# Patient Record
Sex: FEMALE | Race: BLACK OR AFRICAN AMERICAN | NOT HISPANIC OR LATINO | Employment: OTHER | URBAN - METROPOLITAN AREA
[De-identification: names, ages, dates, MRNs, and addresses within clinical notes are randomized per-mention and may not be internally consistent; named-entity substitution may affect disease eponyms.]

---

## 2018-03-07 ENCOUNTER — OFFICE VISIT (OUTPATIENT)
Dept: SURGERY | Facility: CLINIC | Age: 53
End: 2018-03-07
Payer: MEDICAID

## 2018-03-07 VITALS
BODY MASS INDEX: 36.19 KG/M2 | SYSTOLIC BLOOD PRESSURE: 111 MMHG | HEART RATE: 51 BPM | TEMPERATURE: 99 F | HEIGHT: 65 IN | DIASTOLIC BLOOD PRESSURE: 69 MMHG | WEIGHT: 217.25 LBS

## 2018-03-07 DIAGNOSIS — K43.2 INCISIONAL HERNIA, WITHOUT OBSTRUCTION OR GANGRENE: ICD-10-CM

## 2018-03-07 DIAGNOSIS — Z93.3 COLOSTOMY IN PLACE: Primary | ICD-10-CM

## 2018-03-07 PROCEDURE — 99999 PR PBB SHADOW E&M-NEW PATIENT-LVL III: CPT | Mod: PBBFAC,,, | Performed by: SURGERY

## 2018-03-07 PROCEDURE — 99203 OFFICE O/P NEW LOW 30 MIN: CPT | Mod: PBBFAC | Performed by: SURGERY

## 2018-03-07 PROCEDURE — 99204 OFFICE O/P NEW MOD 45 MIN: CPT | Mod: S$PBB,,, | Performed by: SURGERY

## 2018-03-07 RX ORDER — HYDROXYZINE PAMOATE 25 MG/1
25 CAPSULE ORAL EVERY 8 HOURS PRN
COMMUNITY

## 2018-03-07 RX ORDER — HYDROCODONE BITARTRATE AND ACETAMINOPHEN 5; 325 MG/1; MG/1
1 TABLET ORAL
Status: ON HOLD | COMMUNITY
End: 2018-04-17 | Stop reason: HOSPADM

## 2018-03-07 RX ORDER — TRAZODONE HYDROCHLORIDE 50 MG/1
50 TABLET ORAL NIGHTLY PRN
COMMUNITY

## 2018-03-07 RX ORDER — ATENOLOL AND CHLORTHALIDONE TABLET 100; 25 MG/1; MG/1
1 TABLET ORAL
Status: ON HOLD | COMMUNITY
End: 2018-04-17 | Stop reason: HOSPADM

## 2018-03-07 RX ORDER — ESCITALOPRAM OXALATE 20 MG/1
20 TABLET ORAL
COMMUNITY

## 2018-03-07 NOTE — LETTER
March 9, 2018      Rico Saeed MD  [address] [facility]  [address]  [facility]  [place] [address]           [facility] - General Surgery  [address]  [place] [address]  Phone: [phone]          Patient: Barbara Ortiz   MR Number: [id number]   YOB: 1965   Date of Visit: 3/7/2018       Dear Dr. Rico Saeed:    Thank you for referring Barbara Ortzi to me for evaluation. Attached you will find relevant portions of my assessment and plan of care.    If you have questions, please do not hesitate to call me. I look forward to following Barbara Ortiz along with you.    Sincerely,    Teo Baker MD    Enclosure  CC:  No Recipients    If you would like to receive this communication electronically, please contact [email or web address] or [phone] to request more information on [employer] access.    For providers and/or their staff who would like to refer a patient to [facility], please contact us through our one-stop-shop provider referral line, [facility] , at [phone].    If you feel you have received this communication in error or would no longer like to receive these types of communications, please e-mail [email or web address]

## 2018-03-09 NOTE — PROGRESS NOTES
History & Physical    SUBJECTIVE:     History of Present Illness:  Patient is a 52 y.o. female presents with abdominal pain. Onset of symptoms was gradual starting several months ago with gradually worsening course since that time. Patient denies fever or chills.  [facility]. Symptoms are aggravated by eating. Symptoms improve with rest.      Chief Complaint   Patient presents with    Consult       Review of patient's allergies indicates:  No Known Allergies    Current Outpatient Prescriptions   Medication Sig Dispense Refill    atenolol-chlorthalidone (TENORETIC) 100-25 mg per tablet Take 1 tablet by mouth.      escitalopram oxalate (LEXAPRO) 20 MG tablet Take 20 mg by mouth.      hydrocodone-acetaminophen 5-325mg (NORCO) 5-325 mg per tablet Take 1 tablet by mouth.      hydrOXYzine pamoate (VISTARIL) 25 MG Cap Take 25 mg by mouth every 4 (four) hours as needed.       traZODone (DESYREL) 50 MG tablet Take 50 mg by mouth once daily.        No current facility-administered medications for this visit.        No past medical history on file.  No past surgical history on file.  No family history on file.  Social History   Substance Use Topics    Smoking status: Never Smoker    Smokeless tobacco: Never Used    Alcohol use Not on file        Review of Systems:      I have reviewed the Patient Summary Reports.        Review of Systems  Anesthesia Hx:  No problems with previous Anesthesia    Social:  Non-Smoker    Hematology/Oncology:  Hematology Normal   Oncology Normal     EENT/Dental:EENT/Dental Normal   Cardiovascular:  Cardiovascular Normal     Renal/:  Renal/ Normal     Hepatic/GI:  Hepatic/GI Normal    Musculoskeletal:  Musculoskeletal Normal    Neurological:  Neurology Normal    Dermatological:  Skin Normal    Psych:  Psychiatric Normal           OBJECTIVE:     Vital Signs (Most Recent)  Temp: 98.6 °F (37 °C) (03/07/18 1311)  Pulse: (!) 51 (03/07/18 1311)  BP: 111/69  "(03/07/18 1311)  5' 5" (1.651 m)  98.5 kg (217 lb 4.2 oz)     Physical Exam:    Physical Exam  General:  Well nourished, Obesity    Airway/Jaw/Neck:  AIRWAY FINDINGS: Normal      Eyes/Ears/Nose:  EYES/EARS/NOSE FINDINGS: Normal   Dental:  DENTAL FINDINGS: Normal   Chest/Lungs:  Chest/Lungs Clear    Heart/Vascular:  Heart Findings: Normal Heart murmur: negative Vascular Findings: Normal    Abdomen:  Abdomen Findings:  Colostomy in place    Moderately tender but reducible incisional hernia     Musculoskeletal:  Musculoskeletal Findings: Normal   Skin:  Skin Findings: Normal    Mental Status:  Mental Status Findings: Normal        Laboratory  none    Diagnostic Results:  none    ASSESSMENT/PLAN:     Patient s/p partial colectomy for perforated diverticulitis    PLAN:Plan     gastrograffin enema to assess ability to perform colostomy takedown,  Repair incisional hernia       "

## 2018-03-28 ENCOUNTER — OFFICE VISIT (OUTPATIENT)
Dept: SURGERY | Facility: CLINIC | Age: 53
End: 2018-03-28
Payer: MEDICAID

## 2018-03-28 ENCOUNTER — HOSPITAL ENCOUNTER (OUTPATIENT)
Dept: CARDIOLOGY | Facility: CLINIC | Age: 53
Discharge: HOME | End: 2018-03-28
Attending: SURGERY
Payer: COMMERCIAL

## 2018-03-28 VITALS
HEIGHT: 65 IN | HEART RATE: 59 BPM | TEMPERATURE: 99 F | SYSTOLIC BLOOD PRESSURE: 116 MMHG | DIASTOLIC BLOOD PRESSURE: 72 MMHG | WEIGHT: 217.69 LBS | BODY MASS INDEX: 36.27 KG/M2

## 2018-03-28 DIAGNOSIS — K43.2 INCISIONAL HERNIA, WITHOUT OBSTRUCTION OR GANGRENE: ICD-10-CM

## 2018-03-28 DIAGNOSIS — Z93.3 COLOSTOMY IN PLACE: ICD-10-CM

## 2018-03-28 DIAGNOSIS — K43.2 INCISIONAL HERNIA, WITHOUT OBSTRUCTION OR GANGRENE: Primary | ICD-10-CM

## 2018-03-28 PROCEDURE — 99213 OFFICE O/P EST LOW 20 MIN: CPT | Mod: S$PBB,,, | Performed by: SURGERY

## 2018-03-28 PROCEDURE — 93010 ELECTROCARDIOGRAM REPORT: CPT | Mod: S$PBB,,, | Performed by: INTERNAL MEDICINE

## 2018-03-28 PROCEDURE — 99999 PR PBB SHADOW E&M-EST. PATIENT-LVL IV: CPT | Mod: PBBFAC,,, | Performed by: SURGERY

## 2018-03-28 PROCEDURE — 99214 OFFICE O/P EST MOD 30 MIN: CPT | Mod: PBBFAC,25 | Performed by: SURGERY

## 2018-03-28 PROCEDURE — 93005 ELECTROCARDIOGRAM TRACING: CPT | Mod: PBBFAC | Performed by: INTERNAL MEDICINE

## 2018-03-28 RX ORDER — LOSARTAN POTASSIUM 100 MG/1
100 TABLET ORAL NIGHTLY
Status: ON HOLD | COMMUNITY
Start: 2018-01-22 | End: 2018-04-17

## 2018-03-28 RX ORDER — GABAPENTIN 100 MG/1
100 CAPSULE ORAL 3 TIMES DAILY
COMMUNITY
Start: 2018-01-15 | End: 2018-09-10 | Stop reason: CLARIF

## 2018-03-28 RX ORDER — HYDROCODONE BITARTRATE AND ACETAMINOPHEN 10; 325 MG/1; MG/1
TABLET ORAL EVERY 4 HOURS PRN
Status: ON HOLD | COMMUNITY
Start: 2018-03-02 | End: 2018-04-17 | Stop reason: HOSPADM

## 2018-03-28 RX ORDER — ATENOLOL AND CHLORTHALIDONE TABLET 50; 25 MG/1; MG/1
1 TABLET ORAL DAILY
COMMUNITY
Start: 2018-02-14 | End: 2018-09-10 | Stop reason: CLARIF

## 2018-04-01 NOTE — PROGRESS NOTES
History & Physical    SUBJECTIVE:     History of Present Illness:  Patient is a 52 y.o. female presents with incisional hernia associated with abdominal pain and the desire to have colostomy reversed. Onset of symptoms was abrupt starting several months ago with gradually worsening course since that time. Patient denies curent obstructive symptoms, although did have an ED admission at [facility] requiring [facility] tube decompression. Symptoms are aggravated by activity. Symptoms improve with rest.      Chief Complaint   Patient presents with    Pre-op Exam       Review of patient's allergies indicates:  No Known Allergies    Current Outpatient Prescriptions   Medication Sig Dispense Refill    atenolol-chlorthalidone (TENORETIC) 100-25 mg per tablet Take 1 tablet by mouth.      atenolol-chlorthalidone (TENORETIC) 50-25 mg Tab Take 1 tablet by mouth once daily.       escitalopram oxalate (LEXAPRO) 20 MG tablet Take 20 mg by mouth.      gabapentin (NEURONTIN) 100 MG capsule Take 100 mg by mouth 2 (two) times daily.       hydrocodone-acetaminophen 10-325mg (NORCO)  mg Tab Take by mouth every 4 (four) hours as needed.       hydrocodone-acetaminophen 5-325mg (NORCO) 5-325 mg per tablet Take 1 tablet by mouth.      hydrOXYzine pamoate (VISTARIL) 25 MG Cap Take 25 mg by mouth every 4 (four) hours as needed.       losartan (COZAAR) 100 MG tablet Take 100 mg by mouth once daily.       traZODone (DESYREL) 50 MG tablet Take 50 mg by mouth once daily.        No current facility-administered medications for this visit.        No past medical history on file.  No past surgical history on file.  No family history on file.  Social History   Substance Use Topics    Smoking status: Never Smoker    Smokeless tobacco: Never Used    Alcohol use Not on file        Review of Systems:      I have reviewed the Patient Summary Reports.        Review of Systems  Anesthesia Hx:  No problems with previous Anesthesia    Social:  Non-Smoker   "  Hematology/Oncology:  Hematology Normal   Oncology Normal     EENT/Dental:EENT/Dental Normal   Cardiovascular:  Cardiovascular Normal     Renal/:  Renal/ Normal     Hepatic/GI:  Hepatic/GI Normal    Musculoskeletal:  Musculoskeletal Normal    Neurological:  Neurology Normal    Dermatological:  Skin Normal    Psych:  Psychiatric Normal           OBJECTIVE:     Vital Signs (Most Recent)  Temp: 98.6 °F (37 °C) (03/28/18 1310)  Pulse: (!) 59 (03/28/18 1310)  BP: 116/72 (03/28/18 1310)  5' 5" (1.651 m)  98.8 kg (217 lb 11.3 oz)     Physical Exam:    Physical Exam  General:  Obesity    Airway/Jaw/Neck:  AIRWAY FINDINGS: Normal      Eyes/Ears/Nose:  EYES/EARS/NOSE FINDINGS: Normal   Dental:  DENTAL FINDINGS: Normal   Chest/Lungs:  Chest/Lungs Clear    Heart/Vascular:  Heart Findings: Normal Heart murmur: negative Vascular Findings: Normal    Abdomen:  Abdomen Findings:  Mid epigastric incisional hernia,  Colostomy in place     Musculoskeletal:  Musculoskeletal Findings: Normal   Skin:  Skin Findings: Normal    Mental Status:  Mental Status Findings: Normal        Laboratory  none    Diagnostic Results:  CT: Reviewed  incisional hernia, adequate remaining rectosigmoid for takedown    ASSESSMENT/PLAN:     Incisional hernia and presence of colostomu    PLAN:Plan     Takedown colostomy and repair abdominal wall       "

## 2018-04-05 DIAGNOSIS — K43.2 INCISIONAL HERNIA: ICD-10-CM

## 2018-04-05 RX ORDER — SODIUM CHLORIDE 9 MG/ML
INJECTION, SOLUTION INTRAVENOUS CONTINUOUS
Status: CANCELLED | OUTPATIENT
Start: 2018-04-05

## 2018-04-09 ENCOUNTER — TELEPHONE (OUTPATIENT)
Dept: SURGERY | Facility: CLINIC | Age: 53
End: 2018-04-09

## 2018-04-09 NOTE — PRE-PROCEDURE INSTRUCTIONS
PreOp Instructions given:     - Verbal medication information (what to hold and what to take)   - NPO guidelines   - Arrival place directions given;   - Bathing with antibacterial soap   - Don't wear any jewelry or bring any valuables AM of surgery   - No makeup or moisturizer to face   - No perfume/cologne, powder, lotions or aftershave     Pt. verbalized understanding.     Denies any  history of side effects or issues with anesthesia or sedation.    ODALIS - INSTRUCTED TO BRING CPAP MACHINE    PT REPORTS THAT SHE IS A VERY DIFFICULT IV STICK - REMARKS THAT IN THE PAST - PICC LINES HAVE BEEN PLACED.

## 2018-04-10 ENCOUNTER — SURGERY (OUTPATIENT)
Age: 53
End: 2018-04-10

## 2018-04-10 ENCOUNTER — HOSPITAL ENCOUNTER (INPATIENT)
Facility: HOSPITAL | Age: 53
LOS: 7 days | Discharge: HOME HEALTH | DRG: 330 | End: 2018-04-17
Attending: SURGERY | Admitting: SURGERY
Payer: COMMERCIAL

## 2018-04-10 ENCOUNTER — ANESTHESIA EVENT (OUTPATIENT)
Dept: SURGERY | Facility: HOSPITAL | Age: 53
DRG: 330 | End: 2018-04-10
Payer: COMMERCIAL

## 2018-04-10 ENCOUNTER — ANESTHESIA (OUTPATIENT)
Dept: SURGERY | Facility: HOSPITAL | Age: 53
DRG: 330 | End: 2018-04-10
Payer: COMMERCIAL

## 2018-04-10 DIAGNOSIS — K43.2 INCISIONAL HERNIA: ICD-10-CM

## 2018-04-10 PROCEDURE — 71000039 HC RECOVERY, EACH ADD'L HOUR: Performed by: SURGERY

## 2018-04-10 PROCEDURE — 37000009 HC ANESTHESIA EA ADD 15 MINS: Performed by: SURGERY

## 2018-04-10 PROCEDURE — 88304 TISSUE EXAM BY PATHOLOGIST: CPT

## 2018-04-10 PROCEDURE — 63600175 PHARM REV CODE 636 W HCPCS: Performed by: SURGERY

## 2018-04-10 PROCEDURE — D9220A PRA ANESTHESIA: Mod: ANES,,, | Performed by: ANESTHESIOLOGY

## 2018-04-10 PROCEDURE — 36000708 HC OR TIME LEV III 1ST 15 MIN: Performed by: SURGERY

## 2018-04-10 PROCEDURE — 27000221 HC OXYGEN, UP TO 24 HOURS

## 2018-04-10 PROCEDURE — 25000003 PHARM REV CODE 250: Performed by: SURGERY

## 2018-04-10 PROCEDURE — 25000003 PHARM REV CODE 250: Performed by: NURSE ANESTHETIST, CERTIFIED REGISTERED

## 2018-04-10 PROCEDURE — 94761 N-INVAS EAR/PLS OXIMETRY MLT: CPT

## 2018-04-10 PROCEDURE — 27201423 OPTIME MED/SURG SUP & DEVICES STERILE SUPPLY: Performed by: SURGERY

## 2018-04-10 PROCEDURE — 63600175 PHARM REV CODE 636 W HCPCS: Performed by: NURSE ANESTHETIST, CERTIFIED REGISTERED

## 2018-04-10 PROCEDURE — D9220A PRA ANESTHESIA: Mod: CRNA,,, | Performed by: NURSE ANESTHETIST, CERTIFIED REGISTERED

## 2018-04-10 PROCEDURE — C9290 INJ, BUPIVACAINE LIPOSOME: HCPCS | Performed by: SURGERY

## 2018-04-10 PROCEDURE — 71000033 HC RECOVERY, INTIAL HOUR: Performed by: SURGERY

## 2018-04-10 PROCEDURE — 37000008 HC ANESTHESIA 1ST 15 MINUTES: Performed by: SURGERY

## 2018-04-10 PROCEDURE — 63600175 PHARM REV CODE 636 W HCPCS: Performed by: PHYSICIAN ASSISTANT

## 2018-04-10 PROCEDURE — 44626 REPAIR BOWEL OPENING: CPT | Mod: ,,, | Performed by: SURGERY

## 2018-04-10 PROCEDURE — 36000709 HC OR TIME LEV III EA ADD 15 MIN: Performed by: SURGERY

## 2018-04-10 PROCEDURE — 25000003 PHARM REV CODE 250: Performed by: PHYSICIAN ASSISTANT

## 2018-04-10 PROCEDURE — 88304 TISSUE EXAM BY PATHOLOGIST: CPT | Mod: 26,,,

## 2018-04-10 RX ORDER — BUPIVACAINE HYDROCHLORIDE 2.5 MG/ML
INJECTION, SOLUTION EPIDURAL; INFILTRATION; INTRACAUDAL
Status: DISCONTINUED | OUTPATIENT
Start: 2018-04-10 | End: 2018-04-10

## 2018-04-10 RX ORDER — RAMELTEON 8 MG/1
8 TABLET ORAL NIGHTLY PRN
Status: DISCONTINUED | OUTPATIENT
Start: 2018-04-10 | End: 2018-04-17 | Stop reason: HOSPADM

## 2018-04-10 RX ORDER — NALOXONE HCL 0.4 MG/ML
0.02 VIAL (ML) INJECTION
Status: DISCONTINUED | OUTPATIENT
Start: 2018-04-10 | End: 2018-04-12

## 2018-04-10 RX ORDER — ENOXAPARIN SODIUM 100 MG/ML
40 INJECTION SUBCUTANEOUS EVERY 24 HOURS
Status: DISCONTINUED | OUTPATIENT
Start: 2018-04-11 | End: 2018-04-17 | Stop reason: HOSPADM

## 2018-04-10 RX ORDER — SODIUM CHLORIDE 0.9 % (FLUSH) 0.9 %
3 SYRINGE (ML) INJECTION
Status: DISCONTINUED | OUTPATIENT
Start: 2018-04-10 | End: 2018-04-17 | Stop reason: HOSPADM

## 2018-04-10 RX ORDER — LIDOCAINE HCL/PF 100 MG/5ML
SYRINGE (ML) INTRAVENOUS
Status: DISCONTINUED | OUTPATIENT
Start: 2018-04-10 | End: 2018-04-10

## 2018-04-10 RX ORDER — MIDAZOLAM HYDROCHLORIDE 1 MG/ML
INJECTION, SOLUTION INTRAMUSCULAR; INTRAVENOUS
Status: DISCONTINUED | OUTPATIENT
Start: 2018-04-10 | End: 2018-04-10

## 2018-04-10 RX ORDER — HYDROMORPHONE HCL IN 0.9% NACL 6 MG/30 ML
PATIENT CONTROLLED ANALGESIA SYRINGE INTRAVENOUS
Status: DISPENSED
Start: 2018-04-10 | End: 2018-04-11

## 2018-04-10 RX ORDER — METOCLOPRAMIDE HYDROCHLORIDE 5 MG/ML
10 INJECTION INTRAMUSCULAR; INTRAVENOUS EVERY 10 MIN PRN
Status: DISCONTINUED | OUTPATIENT
Start: 2018-04-10 | End: 2018-04-10 | Stop reason: HOSPADM

## 2018-04-10 RX ORDER — HYDROMORPHONE HCL IN 0.9% NACL 6 MG/30 ML
PATIENT CONTROLLED ANALGESIA SYRINGE INTRAVENOUS CONTINUOUS
Status: DISCONTINUED | OUTPATIENT
Start: 2018-04-10 | End: 2018-04-12

## 2018-04-10 RX ORDER — FENTANYL CITRATE 50 UG/ML
INJECTION, SOLUTION INTRAMUSCULAR; INTRAVENOUS
Status: DISCONTINUED | OUTPATIENT
Start: 2018-04-10 | End: 2018-04-10

## 2018-04-10 RX ORDER — GLYCOPYRROLATE 0.2 MG/ML
INJECTION INTRAMUSCULAR; INTRAVENOUS
Status: DISCONTINUED | OUTPATIENT
Start: 2018-04-10 | End: 2018-04-10

## 2018-04-10 RX ORDER — DEXAMETHASONE SODIUM PHOSPHATE 4 MG/ML
INJECTION, SOLUTION INTRA-ARTICULAR; INTRALESIONAL; INTRAMUSCULAR; INTRAVENOUS; SOFT TISSUE
Status: DISCONTINUED | OUTPATIENT
Start: 2018-04-10 | End: 2018-04-10

## 2018-04-10 RX ORDER — NEOSTIGMINE METHYLSULFATE 1 MG/ML
INJECTION, SOLUTION INTRAVENOUS
Status: DISCONTINUED | OUTPATIENT
Start: 2018-04-10 | End: 2018-04-10

## 2018-04-10 RX ORDER — ATENOLOL 50 MG/1
100 TABLET ORAL DAILY
Status: DISCONTINUED | OUTPATIENT
Start: 2018-04-11 | End: 2018-04-17

## 2018-04-10 RX ORDER — FENTANYL CITRATE 50 UG/ML
25 INJECTION, SOLUTION INTRAMUSCULAR; INTRAVENOUS EVERY 5 MIN PRN
Status: DISCONTINUED | OUTPATIENT
Start: 2018-04-10 | End: 2018-04-10 | Stop reason: HOSPADM

## 2018-04-10 RX ORDER — VECURONIUM BROMIDE FOR INJECTION 1 MG/ML
INJECTION, POWDER, LYOPHILIZED, FOR SOLUTION INTRAVENOUS
Status: DISCONTINUED | OUTPATIENT
Start: 2018-04-10 | End: 2018-04-10

## 2018-04-10 RX ORDER — DEXTROSE MONOHYDRATE, SODIUM CHLORIDE, AND POTASSIUM CHLORIDE 50; 1.49; 4.5 G/1000ML; G/1000ML; G/1000ML
INJECTION, SOLUTION INTRAVENOUS
Status: DISPENSED
Start: 2018-04-10 | End: 2018-04-11

## 2018-04-10 RX ORDER — PROPOFOL 10 MG/ML
VIAL (ML) INTRAVENOUS
Status: DISCONTINUED | OUTPATIENT
Start: 2018-04-10 | End: 2018-04-10

## 2018-04-10 RX ORDER — ACETAMINOPHEN 10 MG/ML
INJECTION, SOLUTION INTRAVENOUS
Status: DISCONTINUED | OUTPATIENT
Start: 2018-04-10 | End: 2018-04-10

## 2018-04-10 RX ORDER — ROCURONIUM BROMIDE 10 MG/ML
INJECTION, SOLUTION INTRAVENOUS
Status: DISCONTINUED | OUTPATIENT
Start: 2018-04-10 | End: 2018-04-10

## 2018-04-10 RX ORDER — ATENOLOL AND CHLORTHALIDONE TABLET 100; 25 MG/1; MG/1
1 TABLET ORAL DAILY
Status: DISCONTINUED | OUTPATIENT
Start: 2018-04-11 | End: 2018-04-10

## 2018-04-10 RX ORDER — DIPHENHYDRAMINE HYDROCHLORIDE 50 MG/ML
25 INJECTION INTRAMUSCULAR; INTRAVENOUS EVERY 4 HOURS PRN
Status: DISCONTINUED | OUTPATIENT
Start: 2018-04-10 | End: 2018-04-17 | Stop reason: HOSPADM

## 2018-04-10 RX ORDER — DEXTROSE MONOHYDRATE, SODIUM CHLORIDE, AND POTASSIUM CHLORIDE 50; 1.49; 4.5 G/1000ML; G/1000ML; G/1000ML
INJECTION, SOLUTION INTRAVENOUS CONTINUOUS
Status: DISCONTINUED | OUTPATIENT
Start: 2018-04-10 | End: 2018-04-15

## 2018-04-10 RX ORDER — SODIUM CHLORIDE 9 MG/ML
INJECTION, SOLUTION INTRAVENOUS CONTINUOUS
Status: DISCONTINUED | OUTPATIENT
Start: 2018-04-10 | End: 2018-04-10

## 2018-04-10 RX ORDER — CHLORTHALIDONE 25 MG/1
25 TABLET ORAL DAILY
Status: DISCONTINUED | OUTPATIENT
Start: 2018-04-11 | End: 2018-04-17 | Stop reason: HOSPADM

## 2018-04-10 RX ORDER — ESCITALOPRAM OXALATE 20 MG/1
20 TABLET ORAL DAILY
Status: DISCONTINUED | OUTPATIENT
Start: 2018-04-11 | End: 2018-04-17 | Stop reason: HOSPADM

## 2018-04-10 RX ORDER — ONDANSETRON 8 MG/1
8 TABLET, ORALLY DISINTEGRATING ORAL EVERY 8 HOURS PRN
Status: DISCONTINUED | OUTPATIENT
Start: 2018-04-10 | End: 2018-04-17 | Stop reason: HOSPADM

## 2018-04-10 RX ORDER — ONDANSETRON 2 MG/ML
INJECTION INTRAMUSCULAR; INTRAVENOUS
Status: DISCONTINUED | OUTPATIENT
Start: 2018-04-10 | End: 2018-04-10

## 2018-04-10 RX ORDER — CEFAZOLIN SODIUM 1 G/3ML
2 INJECTION, POWDER, FOR SOLUTION INTRAMUSCULAR; INTRAVENOUS
Status: COMPLETED | OUTPATIENT
Start: 2018-04-10 | End: 2018-04-10

## 2018-04-10 RX ADMIN — BUPIVACAINE HYDROCHLORIDE 30 ML: 2.5 INJECTION, SOLUTION EPIDURAL; INFILTRATION; INTRACAUDAL; PERINEURAL at 12:04

## 2018-04-10 RX ADMIN — SODIUM CHLORIDE, SODIUM GLUCONATE, SODIUM ACETATE, POTASSIUM CHLORIDE, MAGNESIUM CHLORIDE, SODIUM PHOSPHATE, DIBASIC, AND POTASSIUM PHOSPHATE: .53; .5; .37; .037; .03; .012; .00082 INJECTION, SOLUTION INTRAVENOUS at 10:04

## 2018-04-10 RX ADMIN — FENTANYL CITRATE 50 MCG: 50 INJECTION, SOLUTION INTRAMUSCULAR; INTRAVENOUS at 12:04

## 2018-04-10 RX ADMIN — FENTANYL CITRATE 50 MCG: 50 INJECTION, SOLUTION INTRAMUSCULAR; INTRAVENOUS at 09:04

## 2018-04-10 RX ADMIN — VECURONIUM BROMIDE FOR INJECTION 1 MG: 1 INJECTION, POWDER, LYOPHILIZED, FOR SOLUTION INTRAVENOUS at 10:04

## 2018-04-10 RX ADMIN — NEOSTIGMINE METHYLSULFATE 4 MG: 1 INJECTION INTRAVENOUS at 12:04

## 2018-04-10 RX ADMIN — GLYCOPYRROLATE 0.6 MG: 0.2 INJECTION, SOLUTION INTRAMUSCULAR; INTRAVENOUS at 12:04

## 2018-04-10 RX ADMIN — BUPIVACAINE 20 ML: 13.3 INJECTION, SUSPENSION, LIPOSOMAL INFILTRATION at 12:04

## 2018-04-10 RX ADMIN — LIDOCAINE HYDROCHLORIDE 60 MG: 20 INJECTION, SOLUTION INTRAVENOUS at 09:04

## 2018-04-10 RX ADMIN — VECURONIUM BROMIDE FOR INJECTION 1 MG: 1 INJECTION, POWDER, LYOPHILIZED, FOR SOLUTION INTRAVENOUS at 12:04

## 2018-04-10 RX ADMIN — ROCURONIUM BROMIDE 20 MG: 10 INJECTION, SOLUTION INTRAVENOUS at 09:04

## 2018-04-10 RX ADMIN — DIPHENHYDRAMINE HYDROCHLORIDE 25 MG: 50 INJECTION, SOLUTION INTRAMUSCULAR; INTRAVENOUS at 09:04

## 2018-04-10 RX ADMIN — DEXAMETHASONE SODIUM PHOSPHATE 8 MG: 4 INJECTION, SOLUTION INTRAMUSCULAR; INTRAVENOUS at 09:04

## 2018-04-10 RX ADMIN — Medication: at 11:04

## 2018-04-10 RX ADMIN — ROCURONIUM BROMIDE 40 MG: 10 INJECTION, SOLUTION INTRAVENOUS at 09:04

## 2018-04-10 RX ADMIN — ONDANSETRON 4 MG: 2 INJECTION INTRAMUSCULAR; INTRAVENOUS at 09:04

## 2018-04-10 RX ADMIN — Medication: at 01:04

## 2018-04-10 RX ADMIN — ACETAMINOPHEN 1000 MG: 10 INJECTION, SOLUTION INTRAVENOUS at 09:04

## 2018-04-10 RX ADMIN — ROCURONIUM BROMIDE 20 MG: 10 INJECTION, SOLUTION INTRAVENOUS at 10:04

## 2018-04-10 RX ADMIN — MIDAZOLAM HYDROCHLORIDE 2 MG: 1 INJECTION, SOLUTION INTRAMUSCULAR; INTRAVENOUS at 09:04

## 2018-04-10 RX ADMIN — FENTANYL CITRATE 50 MCG: 50 INJECTION, SOLUTION INTRAMUSCULAR; INTRAVENOUS at 10:04

## 2018-04-10 RX ADMIN — FENTANYL CITRATE 25 MCG: 50 INJECTION, SOLUTION INTRAMUSCULAR; INTRAVENOUS at 12:04

## 2018-04-10 RX ADMIN — PROPOFOL 170 MG: 10 INJECTION, EMULSION INTRAVENOUS at 09:04

## 2018-04-10 RX ADMIN — CEFAZOLIN 2 G: 330 INJECTION, POWDER, FOR SOLUTION INTRAMUSCULAR; INTRAVENOUS at 09:04

## 2018-04-10 RX ADMIN — VECURONIUM BROMIDE FOR INJECTION 1 MG: 1 INJECTION, POWDER, LYOPHILIZED, FOR SOLUTION INTRAVENOUS at 11:04

## 2018-04-10 RX ADMIN — DEXTROSE MONOHYDRATE, SODIUM CHLORIDE, AND POTASSIUM CHLORIDE: 50; 4.5; 1.49 INJECTION, SOLUTION INTRAVENOUS at 09:04

## 2018-04-10 RX ADMIN — DEXTROSE MONOHYDRATE, SODIUM CHLORIDE, AND POTASSIUM CHLORIDE: 50; 4.5; 1.49 INJECTION, SOLUTION INTRAVENOUS at 01:04

## 2018-04-10 RX ADMIN — SODIUM CHLORIDE: 0.9 INJECTION, SOLUTION INTRAVENOUS at 08:04

## 2018-04-10 NOTE — TRANSFER OF CARE
"Anesthesia Transfer of Care Note    Patient: Barbara Ortiz    Procedure(s) Performed: Procedure(s) (LRB):  REPAIR-HERNIA-INCISIONAL-INITIAL Open (N/A)  COLOSTOMY-REVERSAL (N/A)    Patient location: PACU    Anesthesia Type: general    Transport from OR: Transported from OR on 6-10 L/min O2 by face mask with adequate spontaneous ventilation    Post pain: adequate analgesia    Post assessment: no apparent anesthetic complications    Post vital signs: stable    Level of consciousness: sedated and responds to stimulation    Nausea/Vomiting: no nausea/vomiting    Complications: none    Transfer of care protocol was followed      Last vitals:   Visit Vitals  /71   Pulse 97   Temp 36.9 °C (98.4 °F) (Axillary)   Resp 18   Ht 5' 5" (1.651 m)   Wt 98.4 kg (217 lb)   SpO2 100%   Breastfeeding? No   BMI 36.11 kg/m²     "

## 2018-04-10 NOTE — ANESTHESIA PREPROCEDURE EVALUATION
04/10/2018  Barbara Ortiz is a 52 y.o., female.    Anesthesia Evaluation    I have reviewed the Patient Summary Reports.    I have reviewed the Nursing Notes.   I have reviewed the Medications.     Review of Systems  Anesthesia Hx:  No problems with previous Anesthesia  History of prior surgery of interest to airway management or planning: Denies Family Hx of Anesthesia complications.   Denies Personal Hx of Anesthesia complications.   Hematology/Oncology:  Hematology Normal   Oncology Normal     EENT/Dental:EENT/Dental Normal   Cardiovascular:   Hypertension    Pulmonary:   Sleep Apnea, CPAP    Renal/:  Renal/ Normal     Hepatic/GI:   GERD, well controlled    Musculoskeletal:  Musculoskeletal Normal    Neurological:  Neurology Normal    Endocrine:  Endocrine Normal    Dermatological:  Skin Normal    Psych:   anxiety          Physical Exam  General:  Obesity    Airway/Jaw/Neck:  Airway Findings: Mouth Opening: Normal Tongue: Normal  General Airway Assessment: Adult  Mallampati: I  Improves to I with phonation.  TM Distance: Normal, at least 6 cm     Eyes/Ears/Nose:  EYES/EARS/NOSE FINDINGS: Normal    Chest/Lungs:  Chest/Lungs Clear    Heart/Vascular:  Heart Findings: Normal       Mental Status:  Mental Status Findings: Normal        Anesthesia Plan  Type of Anesthesia, risks & benefits discussed:  Anesthesia Type:  general  Patient's Preference: General  Intra-op Monitoring Plan: standard ASA monitors  Intra-op Monitoring Plan Comments:   Post Op Pain Control Plan: per primary service following discharge from PACU and IV/PO Opioids PRN  Post Op Pain Control Plan Comments:   Induction:   IV  Beta Blocker:       Beta Blocker Comments: Beta-blocker not taken this morning due to being a combination pill. Will treat intraoperatively as needed.   Informed Consent: Patient understands risks and agrees with  Anesthesia plan.  Questions answered. Anesthesia consent signed with patient.  ASA Score: 2     Day of Surgery Review of History & Physical:    H&P update referred to the surgeon.         Ready For Surgery From Anesthesia Perspective.

## 2018-04-10 NOTE — INTERVAL H&P NOTE
The patient has been examined and the H&P has been reviewed:    I concur with the findings and no changes have occurred since H&P was written.     To OR for hernia repair and takedown of colostomy     Anesthesia/Surgery risks, benefits and alternative options discussed and understood by patient/family.          Active Hospital Problems    Diagnosis  POA    Incisional hernia [K43.2]  Yes      Resolved Hospital Problems    Diagnosis Date Resolved POA   No resolved problems to display.

## 2018-04-10 NOTE — NURSING TRANSFER
Nursing Transfer Note      4/10/2018     Transfer To: 632    Transfer via bed    Transfer with IV pole, PCA    Transported by RN    Medicines sent: none    Chart send with patient: Yes    Notified: sister    Patient reassessed at: 5609

## 2018-04-10 NOTE — BRIEF OP NOTE
[facility]  Brief Operative Note    SUMMARY     Surgery Date: 4/10/2018     Surgeon(s) and Role:     * Kermit Alberto MD - Resident - Assisting     * Joey Quan MD - Resident - Assisting     * Teo Baker MD - Primary        Pre-op Diagnosis:  Colostomy in place [Z93.3]  Incisional hernia, without obstruction or gangrene [K43.2]    Post-op Diagnosis:  Post-Op Diagnosis Codes:     * Colostomy in place [Z93.3]     * Incisional hernia, without obstruction or gangrene [K43.2]    Procedure(s) (LRB):  REPAIR-HERNIA-INCISIONAL-INITIAL Open (N/A)  COLOSTOMY-REVERSAL (N/A)    Anesthesia: General    Description of Procedure: exploratory laparotomy, colostomy take down, stapled colorectal anastomosis, with primary repair of colotomy in 2 layers,. Drain left in pelvis, incisional hernia and ostomy site closed primarily, ostomy wound packed      Estimated Blood Loss: 300 mL         Specimens:   Specimen (12h ago through future)    None

## 2018-04-11 PROBLEM — I10 HYPERTENSION: Status: ACTIVE | Noted: 2018-04-11

## 2018-04-11 PROBLEM — F32.A DEPRESSION: Status: ACTIVE | Noted: 2018-04-11

## 2018-04-11 LAB
ALBUMIN SERPL BCP-MCNC: 3.3 G/DL
ALP SERPL-CCNC: 80 U/L
ALT SERPL W/O P-5'-P-CCNC: 22 U/L
ANION GAP SERPL CALC-SCNC: 7 MMOL/L
ANISOCYTOSIS BLD QL SMEAR: SLIGHT
AST SERPL-CCNC: 19 U/L
BASOPHILS # BLD AUTO: 0.02 K/UL
BASOPHILS NFR BLD: 0.1 %
BILIRUB SERPL-MCNC: 1.5 MG/DL
BUN SERPL-MCNC: 13 MG/DL
CALCIUM SERPL-MCNC: 8.5 MG/DL
CHLORIDE SERPL-SCNC: 100 MMOL/L
CO2 SERPL-SCNC: 26 MMOL/L
CREAT SERPL-MCNC: 0.9 MG/DL
DIFFERENTIAL METHOD: ABNORMAL
EOSINOPHIL # BLD AUTO: 0 K/UL
EOSINOPHIL NFR BLD: 0 %
ERYTHROCYTE [DISTWIDTH] IN BLOOD BY AUTOMATED COUNT: 14.5 %
EST. GFR  (AFRICAN AMERICAN): >60 ML/MIN/1.73 M^2
EST. GFR  (NON AFRICAN AMERICAN): >60 ML/MIN/1.73 M^2
GLUCOSE SERPL-MCNC: 133 MG/DL
HCT VFR BLD AUTO: 32.5 %
HGB BLD-MCNC: 10.7 G/DL
HYPOCHROMIA BLD QL SMEAR: ABNORMAL
IMM GRANULOCYTES # BLD AUTO: 0.06 K/UL
IMM GRANULOCYTES NFR BLD AUTO: 0.4 %
LYMPHOCYTES # BLD AUTO: 1.6 K/UL
LYMPHOCYTES NFR BLD: 11.2 %
MAGNESIUM SERPL-MCNC: 1.9 MG/DL
MCH RBC QN AUTO: 30.1 PG
MCHC RBC AUTO-ENTMCNC: 32.9 G/DL
MCV RBC AUTO: 91 FL
MONOCYTES # BLD AUTO: 1.5 K/UL
MONOCYTES NFR BLD: 10.5 %
NEUTROPHILS # BLD AUTO: 10.8 K/UL
NEUTROPHILS NFR BLD: 77.8 %
NRBC BLD-RTO: 0 /100 WBC
OVALOCYTES BLD QL SMEAR: ABNORMAL
PHOSPHATE SERPL-MCNC: 2.8 MG/DL
PLATELET # BLD AUTO: 228 K/UL
PMV BLD AUTO: 10.3 FL
POIKILOCYTOSIS BLD QL SMEAR: SLIGHT
POLYCHROMASIA BLD QL SMEAR: ABNORMAL
POTASSIUM SERPL-SCNC: 4.3 MMOL/L
PROT SERPL-MCNC: 6.8 G/DL
RBC # BLD AUTO: 3.56 M/UL
SODIUM SERPL-SCNC: 133 MMOL/L
WBC # BLD AUTO: 13.83 K/UL

## 2018-04-11 PROCEDURE — 94799 UNLISTED PULMONARY SVC/PX: CPT

## 2018-04-11 PROCEDURE — 25000003 PHARM REV CODE 250: Performed by: SURGERY

## 2018-04-11 PROCEDURE — 80053 COMPREHEN METABOLIC PANEL: CPT

## 2018-04-11 PROCEDURE — 99900035 HC TECH TIME PER 15 MIN (STAT)

## 2018-04-11 PROCEDURE — 83735 ASSAY OF MAGNESIUM: CPT

## 2018-04-11 PROCEDURE — 84100 ASSAY OF PHOSPHORUS: CPT

## 2018-04-11 PROCEDURE — 63600175 PHARM REV CODE 636 W HCPCS: Performed by: SURGERY

## 2018-04-11 PROCEDURE — 0WQF0ZZ REPAIR ABDOMINAL WALL, OPEN APPROACH: ICD-10-PCS | Performed by: SURGERY

## 2018-04-11 PROCEDURE — 85025 COMPLETE CBC W/AUTO DIFF WBC: CPT

## 2018-04-11 PROCEDURE — 36415 COLL VENOUS BLD VENIPUNCTURE: CPT

## 2018-04-11 PROCEDURE — 25000003 PHARM REV CODE 250: Performed by: STUDENT IN AN ORGANIZED HEALTH CARE EDUCATION/TRAINING PROGRAM

## 2018-04-11 PROCEDURE — 94770 HC EXHALED C02 TEST: CPT

## 2018-04-11 PROCEDURE — 20600001 HC STEP DOWN PRIVATE ROOM

## 2018-04-11 PROCEDURE — 0DQL0ZZ REPAIR TRANSVERSE COLON, OPEN APPROACH: ICD-10-PCS | Performed by: SURGERY

## 2018-04-11 RX ORDER — GABAPENTIN 100 MG/1
100 CAPSULE ORAL 2 TIMES DAILY
Status: DISCONTINUED | OUTPATIENT
Start: 2018-04-11 | End: 2018-04-17 | Stop reason: HOSPADM

## 2018-04-11 RX ORDER — LANOLIN ALCOHOL/MO/W.PET/CERES
400 CREAM (GRAM) TOPICAL ONCE
Status: COMPLETED | OUTPATIENT
Start: 2018-04-11 | End: 2018-04-11

## 2018-04-11 RX ORDER — LOSARTAN POTASSIUM 50 MG/1
100 TABLET ORAL NIGHTLY
Status: DISCONTINUED | OUTPATIENT
Start: 2018-04-11 | End: 2018-04-17

## 2018-04-11 RX ADMIN — DEXTROSE MONOHYDRATE, SODIUM CHLORIDE, AND POTASSIUM CHLORIDE: 50; 4.5; 1.49 INJECTION, SOLUTION INTRAVENOUS at 08:04

## 2018-04-11 RX ADMIN — ENOXAPARIN SODIUM 40 MG: 100 INJECTION SUBCUTANEOUS at 07:04

## 2018-04-11 RX ADMIN — DIPHENHYDRAMINE HYDROCHLORIDE 25 MG: 50 INJECTION, SOLUTION INTRAMUSCULAR; INTRAVENOUS at 03:04

## 2018-04-11 RX ADMIN — MAGNESIUM OXIDE TAB 400 MG (241.3 MG ELEMENTAL MG) 400 MG: 400 (241.3 MG) TAB at 10:04

## 2018-04-11 RX ADMIN — GABAPENTIN 100 MG: 100 CAPSULE ORAL at 10:04

## 2018-04-11 RX ADMIN — CHLORTHALIDONE 25 MG: 25 TABLET ORAL at 09:04

## 2018-04-11 RX ADMIN — DEXTROSE MONOHYDRATE, SODIUM CHLORIDE, AND POTASSIUM CHLORIDE: 50; 4.5; 1.49 INJECTION, SOLUTION INTRAVENOUS at 11:04

## 2018-04-11 RX ADMIN — ESCITALOPRAM 20 MG: 20 TABLET, FILM COATED ORAL at 09:04

## 2018-04-11 RX ADMIN — GABAPENTIN 100 MG: 100 CAPSULE ORAL at 08:04

## 2018-04-11 RX ADMIN — DIPHENHYDRAMINE HYDROCHLORIDE 25 MG: 50 INJECTION, SOLUTION INTRAMUSCULAR; INTRAVENOUS at 10:04

## 2018-04-11 RX ADMIN — Medication: at 10:04

## 2018-04-11 RX ADMIN — DEXTROSE MONOHYDRATE, SODIUM CHLORIDE, AND POTASSIUM CHLORIDE: 50; 4.5; 1.49 INJECTION, SOLUTION INTRAVENOUS at 03:04

## 2018-04-11 RX ADMIN — LOSARTAN POTASSIUM 100 MG: 50 TABLET, FILM COATED ORAL at 08:04

## 2018-04-11 NOTE — OP NOTE
DATE OF PROCEDURE:  04/10/2018    PREOPERATIVE DIAGNOSES:  1.  Presence of colostomy in place.  2.  Incisional hernia.    POSTOPERATIVE DIAGNOSES:  1.  Presence of colostomy in place.  2.  Incisional hernia.    PROCEDURES:  1.  Takedown of colostomy with colorectal anastomosis.  2.  Repair of incisional hernia.    SURGEON:  Teo Baker M.D.    ASSISTANT:  Kermit Alberto M.D. (RES).    ANESTHESIA:  General.    CLINICAL NOTE:  The patient is a 52-year-old female, status post multiple   surgeries and a previous colostomy, who has developed an incisional hernia and   desires her colostomy reversed.    PROCEDURE NOTE:  Following induction of adequate general anesthesia, the   patient's abdomen was prepped and draped with ChloraPrep.  The patient had been   placed in a dorsal lithotomy position.  We then opened her previous incision and   dissected down and ultimately we were able to enter the abdomen.  We then took   down some moderately dense adhesions from the undersurface of the abdominal wall   as we opened the incision fully down to the suprapubic area.  We then entered   the abdomen.  I took down some adhesions and mobilized the remaining transverse   colon with sharp as well as blunt dissection and hemostasis was obtained along   the way with ligatures with 2-0 and 3-0 silk.  We then explored the rectal stump   and we were able to after tedious dissection and preservation of the ureters   identify the rectal stump in the pelvis.  We came across the stump and dissected   free the edges and we then elected to perform an EEA end-to-end anastomosis   from above after making a colotomy in the colon that we were mobilizing into the   pelvis for the anastomosis and then we brought the stapler through this staple   line after placing the anvil in the rectal stump and securing it in place with a   pursestring of 2-0 Prolene.  We then performed the anastomosis and as I   inspected there was an anterior disruption;  however, the bulk of it was intact.    We repaired this anterior disruption with 3-0 Monocryl and 3-0 Vicryl and it   appeared to be tension free, fully patent and viable.  We then irrigated and   obtained hemostasis with cautery.  I then closed the incisional hernia that had   been the site for the patient's recent incarceration with #1 PDS from the inside   and then we closed the fascia at the colostomy takedown site with #1 PDS.  We   then closed the midline with #1 PDS running and staples were applied to skin.    Sterile dressings were then applied and the procedure was terminated with the   patient tolerating it well.    ESTIMATED BLOOD LOSS:  200 mL      MCT/HN  dd: 04/11/2018 12:17:57 (CDT)  td: 04/11/2018 13:45:16 (CDT)  Doc ID   #[id number]  Job ID #[id number]    CC:

## 2018-04-11 NOTE — PLAN OF CARE
"Patient lives in a 1 story house w/2 DARREN (no railings), w/3 DARREN w/1 railing w/male friend. Not medically stable for discharge;POD # 1: waiting for bowel function to return. No needs determined.    [facility] Packet given to patient after informed about it;patient verbalized their understanding.        04/11/18 7425   Discharge Assessment   Assessment Type Discharge Planning Assessment   Confirmed/corrected address and phone number on facesheet? Yes   Assessment information obtained from? Patient;Medical Record   Expected Length of Stay (days) (6+)   Communicated expected length of stay with patient/caregiver no  (Per MD)   Prior to hospitilization cognitive status: Alert/Oriented;No Deficits   Prior to hospitalization functional status: Independent;Assistive Equipment   Current cognitive status: Alert/Oriented;No Deficits   Current Functional Status: Independent;Assistive Equipment;Needs Assistance   Facility Arrived From: (N/A)   Lives With friend(s)  (Tommy GASCA [phone])   Able to Return to Prior Arrangements yes   Is patient able to care for self after discharge? Yes   Who are your caregiver(s) and their phone number(s)? (Emergency contact: Jessie Allred Sister     [phone]. "I have friends who can help me when I go home.")   Patient's perception of discharge disposition home or selfcare   Readmission Within The Last 30 Days no previous admission in last 30 days   Patient currently being followed by outpatient case management? No   Patient currently receives any other outside agency services? No   Equipment Currently Used at Home CPAP   Do you have any problems affording any of your prescribed medications? No   Is the patient taking medications as prescribed? yes   Does the patient have transportation home? Yes   Transportation Available car;family or friend will provide   Dialysis Name and Scheduled days (N/A)   Does the patient receive services at the Coumadin Clinic? No   Discharge Plan A " Home  (Friends to assist her)   Discharge Plan B Home  (as above)   Patient/Family In Agreement With Plan yes

## 2018-04-11 NOTE — PLAN OF CARE
Problem: Patient Care Overview  Goal: Plan of Care Review  Outcome: Ongoing (interventions implemented as appropriate)  Plan of care reviewed with patient, who verbalized understanding. Pt describes pain as well controlled with PCA, though the patient needed to be encouraged to use the PCA button. Pt co- operates with turning and positioning, and is willing to increase activity this morning.

## 2018-04-11 NOTE — PROGRESS NOTES
[facility]  General Surgery  Progress Note    Subjective:     Post-Op Info:  Procedure(s) (LRB):  REPAIR-HERNIA-INCISIONAL-INITIAL Open (N/A)  COLOSTOMY-REVERSAL (N/A)   1 Day Post-Op     Interval History:   Patient seen and examined, no acute events overnight  Reports some bleeding from colostomy take down site, dressing changed twice overnight  Pain well controlled with PCA  LEON drain put out 145mL overnight  Denies N/V/belching  Afebrile/VSS    Medications:  Continuous Infusions:   dextrose 5 % and 0.45 % NaCl with KCl 20 mEq 125 mL/hr at 04/11/18 0346    hydromorphone in 0.9 % NaCl 6 mg/30 ml       Scheduled Meds:   atenolol  100 mg Oral Daily    chlorthalidone  25 mg Oral Daily    enoxaparin  40 mg Subcutaneous Daily    escitalopram oxalate  20 mg Oral Daily     PRN Meds:diphenhydrAMINE, naloxone, ondansetron, promethazine (PHENERGAN) IVPB, ramelteon, sodium chloride 0.9%     Review of patient's allergies indicates:  No Known Allergies  Objective:     Vital Signs (Most Recent):  Temp: 99.3 °F (37.4 °C) (04/11/18 0705)  Pulse: 95 (04/11/18 0705)  Resp: 20 (04/11/18 0705)  BP: (!) 97/52 (04/11/18 0705)  SpO2: 96 % (04/11/18 0705) Vital Signs (24h Range):  Temp:  [97.7 °F (36.5 °C)-99.7 °F (37.6 °C)] 99.3 °F (37.4 °C)  Pulse:  [] 95  Resp:  [16-29] 20  SpO2:  [96 %-100 %] 96 %  BP: ()/(52-74) 97/52     Weight: 99.7 kg (219 lb 12.8 oz)  Body mass index is 36.58 kg/m².    Intake/Output - Last 3 Shifts       04/09 0700 - 04/10 0659 04/10 0700 - 04/11 0659 04/11 0700 - 04/12 0659    P.O.  0     I.V. (mL/kg)  4403.5 (44.2)     Total Intake(mL/kg)  4403.5 (44.2)     Urine (mL/kg/hr)  1270     Drains  145     Blood  300     Total Output   1715      Net   +2688.5                   Physical Exam   Constitutional: She appears well-developed and well-nourished. No distress.   HENT:   Head: Normocephalic and atraumatic.   Cardiovascular: Normal rate and regular rhythm.    Pulmonary/Chest:  Effort normal. No respiratory distress.   Abdominal:   Soft, appropriate TTP, non-distended  Surgical wounds with dressing in place - clean, dry and intact at this time       Significant Labs:  CBC:   Recent Labs  Lab 04/11/18  0509   WBC 13.83*   RBC 3.56*   HGB 10.7*   HCT 32.5*      MCV 91   MCH 30.1   MCHC 32.9     BMP:   Recent Labs  Lab 04/11/18  0509   *   *   K 4.3      CO2 26   BUN 13   CREATININE 0.9   CALCIUM 8.5*   MG 1.9     CMP:   Recent Labs  Lab 04/11/18  0509   *   CALCIUM 8.5*   ALBUMIN 3.3*   PROT 6.8   *   K 4.3   CO2 26      BUN 13   CREATININE 0.9   ALKPHOS 80   ALT 22   AST 19   BILITOT 1.5*     LFTs:   Recent Labs  Lab 04/11/18  0509   ALT 22   AST 19   ALKPHOS 80   BILITOT 1.5*   PROT 6.8   ALBUMIN 3.3*     Assessment/Plan:     * Incisional hernia    53 yo female s/p incisional hernia repair, colostomy take down 4/10/18    -ok for ice chips/sips  -PCA for pain  -nausea meds PRN  -IVF d5 @ 125  -DVT prophylaxis  -PT/OT/IS        Depression    Home meds  -lexapro          Hypertension    Home meds  -atenolol, chlorthalidone            Martha Cho PA-C   [id number]  General Surgery  [facility]

## 2018-04-11 NOTE — PROGRESS NOTES
While sitting up at the edge of the bed, considerable bloody drainage was noted. Findings reported to Dr. Jonas, who personally examined the patient. Bloody drainage from LEON drain also reviewed. No new orders given. Permission granted to change the telfa dressing around midline incision and ostomy takedown site. Will continue to monitor.

## 2018-04-11 NOTE — SUBJECTIVE & OBJECTIVE
Interval History:   Patient seen and examined, no acute events overnight  Reports some bleeding from colostomy take down site, dressing changed twice overnight  Pain well controlled with PCA  LENO drain put out 145mL overnight  Denies N/V/belching  Afebrile/VSS    Medications:  Continuous Infusions:   dextrose 5 % and 0.45 % NaCl with KCl 20 mEq 125 mL/hr at 04/11/18 0346    hydromorphone in 0.9 % NaCl 6 mg/30 ml       Scheduled Meds:   atenolol  100 mg Oral Daily    chlorthalidone  25 mg Oral Daily    enoxaparin  40 mg Subcutaneous Daily    escitalopram oxalate  20 mg Oral Daily     PRN Meds:diphenhydrAMINE, naloxone, ondansetron, promethazine (PHENERGAN) IVPB, ramelteon, sodium chloride 0.9%     Review of patient's allergies indicates:  No Known Allergies  Objective:     Vital Signs (Most Recent):  Temp: 99.3 °F (37.4 °C) (04/11/18 0705)  Pulse: 95 (04/11/18 0705)  Resp: 20 (04/11/18 0705)  BP: (!) 97/52 (04/11/18 0705)  SpO2: 96 % (04/11/18 0705) Vital Signs (24h Range):  Temp:  [97.7 °F (36.5 °C)-99.7 °F (37.6 °C)] 99.3 °F (37.4 °C)  Pulse:  [] 95  Resp:  [16-29] 20  SpO2:  [96 %-100 %] 96 %  BP: ()/(52-74) 97/52     Weight: 99.7 kg (219 lb 12.8 oz)  Body mass index is 36.58 kg/m².    Intake/Output - Last 3 Shifts       04/09 0700 - 04/10 0659 04/10 0700 - 04/11 0659 04/11 0700 - 04/12 0659    P.O.  0     I.V. (mL/kg)  4403.5 (44.2)     Total Intake(mL/kg)  4403.5 (44.2)     Urine (mL/kg/hr)  1270     Drains  145     Blood  300     Total Output   1715      Net   +2688.5                   Physical Exam   Constitutional: She appears well-developed and well-nourished. No distress.   HENT:   Head: Normocephalic and atraumatic.   Cardiovascular: Normal rate and regular rhythm.    Pulmonary/Chest: Effort normal. No respiratory distress.   Abdominal:   Soft, appropriate TTP, non-distended  Surgical wounds with dressing in place - clean, dry and intact at this time       Significant Labs:  CBC:   Recent  Labs  Lab 04/11/18  0509   WBC 13.83*   RBC 3.56*   HGB 10.7*   HCT 32.5*      MCV 91   MCH 30.1   MCHC 32.9     BMP:   Recent Labs  Lab 04/11/18  0509   *   *   K 4.3      CO2 26   BUN 13   CREATININE 0.9   CALCIUM 8.5*   MG 1.9     CMP:   Recent Labs  Lab 04/11/18  0509   *   CALCIUM 8.5*   ALBUMIN 3.3*   PROT 6.8   *   K 4.3   CO2 26      BUN 13   CREATININE 0.9   ALKPHOS 80   ALT 22   AST 19   BILITOT 1.5*     LFTs:   Recent Labs  Lab 04/11/18  0509   ALT 22   AST 19   ALKPHOS 80   BILITOT 1.5*   PROT 6.8   ALBUMIN 3.3*

## 2018-04-11 NOTE — ASSESSMENT & PLAN NOTE
51 yo female s/p incisional hernia repair, colostomy take down 4/10/18    -ok for ice chips/sips  -PCA for pain  -nausea meds PRN  -IVF d5 @ 125  -DVT prophylaxis  -PT/OT/IS

## 2018-04-12 LAB
ALBUMIN SERPL BCP-MCNC: 3.2 G/DL
ALP SERPL-CCNC: 83 U/L
ALT SERPL W/O P-5'-P-CCNC: 16 U/L
ANION GAP SERPL CALC-SCNC: 8 MMOL/L
AST SERPL-CCNC: 19 U/L
BASOPHILS # BLD AUTO: 0.03 K/UL
BASOPHILS NFR BLD: 0.2 %
BILIRUB SERPL-MCNC: 1.8 MG/DL
BUN SERPL-MCNC: 10 MG/DL
CALCIUM SERPL-MCNC: 8.5 MG/DL
CHLORIDE SERPL-SCNC: 99 MMOL/L
CO2 SERPL-SCNC: 25 MMOL/L
CREAT SERPL-MCNC: 0.9 MG/DL
DIFFERENTIAL METHOD: ABNORMAL
EOSINOPHIL # BLD AUTO: 0.1 K/UL
EOSINOPHIL NFR BLD: 0.5 %
ERYTHROCYTE [DISTWIDTH] IN BLOOD BY AUTOMATED COUNT: 14.7 %
EST. GFR  (AFRICAN AMERICAN): >60 ML/MIN/1.73 M^2
EST. GFR  (NON AFRICAN AMERICAN): >60 ML/MIN/1.73 M^2
GLUCOSE SERPL-MCNC: 138 MG/DL
HCT VFR BLD AUTO: 30.5 %
HGB BLD-MCNC: 10.4 G/DL
IMM GRANULOCYTES # BLD AUTO: 0.12 K/UL
IMM GRANULOCYTES NFR BLD AUTO: 0.7 %
LYMPHOCYTES # BLD AUTO: 2.2 K/UL
LYMPHOCYTES NFR BLD: 13.2 %
MAGNESIUM SERPL-MCNC: 2.2 MG/DL
MCH RBC QN AUTO: 30.3 PG
MCHC RBC AUTO-ENTMCNC: 34.1 G/DL
MCV RBC AUTO: 89 FL
MONOCYTES # BLD AUTO: 1.4 K/UL
MONOCYTES NFR BLD: 8.5 %
NEUTROPHILS # BLD AUTO: 12.6 K/UL
NEUTROPHILS NFR BLD: 76.9 %
NRBC BLD-RTO: 0 /100 WBC
PHOSPHATE SERPL-MCNC: 2.1 MG/DL
PLATELET # BLD AUTO: 186 K/UL
PMV BLD AUTO: 10.8 FL
POTASSIUM SERPL-SCNC: 4.1 MMOL/L
PROT SERPL-MCNC: 7 G/DL
RBC # BLD AUTO: 3.43 M/UL
SODIUM SERPL-SCNC: 132 MMOL/L
WBC # BLD AUTO: 16.36 K/UL

## 2018-04-12 PROCEDURE — 84100 ASSAY OF PHOSPHORUS: CPT

## 2018-04-12 PROCEDURE — 83735 ASSAY OF MAGNESIUM: CPT

## 2018-04-12 PROCEDURE — 25000003 PHARM REV CODE 250: Performed by: STUDENT IN AN ORGANIZED HEALTH CARE EDUCATION/TRAINING PROGRAM

## 2018-04-12 PROCEDURE — 63600175 PHARM REV CODE 636 W HCPCS: Performed by: SURGERY

## 2018-04-12 PROCEDURE — 25000003 PHARM REV CODE 250: Performed by: SURGERY

## 2018-04-12 PROCEDURE — 36415 COLL VENOUS BLD VENIPUNCTURE: CPT

## 2018-04-12 PROCEDURE — 20600001 HC STEP DOWN PRIVATE ROOM

## 2018-04-12 PROCEDURE — 85025 COMPLETE CBC W/AUTO DIFF WBC: CPT

## 2018-04-12 PROCEDURE — 80053 COMPREHEN METABOLIC PANEL: CPT

## 2018-04-12 RX ORDER — MORPHINE SULFATE 2 MG/ML
4 INJECTION, SOLUTION INTRAMUSCULAR; INTRAVENOUS EVERY 4 HOURS PRN
Status: DISCONTINUED | OUTPATIENT
Start: 2018-04-12 | End: 2018-04-17

## 2018-04-12 RX ORDER — OXYCODONE AND ACETAMINOPHEN 10; 325 MG/1; MG/1
1 TABLET ORAL EVERY 4 HOURS PRN
Status: DISCONTINUED | OUTPATIENT
Start: 2018-04-12 | End: 2018-04-17 | Stop reason: HOSPADM

## 2018-04-12 RX ORDER — OXYCODONE AND ACETAMINOPHEN 5; 325 MG/1; MG/1
1 TABLET ORAL EVERY 4 HOURS PRN
Status: DISCONTINUED | OUTPATIENT
Start: 2018-04-12 | End: 2018-04-17 | Stop reason: HOSPADM

## 2018-04-12 RX ADMIN — LOSARTAN POTASSIUM 100 MG: 50 TABLET, FILM COATED ORAL at 08:04

## 2018-04-12 RX ADMIN — OXYCODONE HYDROCHLORIDE AND ACETAMINOPHEN 1 TABLET: 10; 325 TABLET ORAL at 07:04

## 2018-04-12 RX ADMIN — ENOXAPARIN SODIUM 40 MG: 100 INJECTION SUBCUTANEOUS at 05:04

## 2018-04-12 RX ADMIN — DEXTROSE MONOHYDRATE, SODIUM CHLORIDE, AND POTASSIUM CHLORIDE: 50; 4.5; 1.49 INJECTION, SOLUTION INTRAVENOUS at 05:04

## 2018-04-12 RX ADMIN — OXYCODONE HYDROCHLORIDE AND ACETAMINOPHEN 1 TABLET: 10; 325 TABLET ORAL at 05:04

## 2018-04-12 RX ADMIN — DEXTROSE MONOHYDRATE, SODIUM CHLORIDE, AND POTASSIUM CHLORIDE: 50; 4.5; 1.49 INJECTION, SOLUTION INTRAVENOUS at 06:04

## 2018-04-12 RX ADMIN — DIPHENHYDRAMINE HYDROCHLORIDE 25 MG: 50 INJECTION, SOLUTION INTRAMUSCULAR; INTRAVENOUS at 10:04

## 2018-04-12 RX ADMIN — CHLORTHALIDONE 25 MG: 25 TABLET ORAL at 09:04

## 2018-04-12 RX ADMIN — GABAPENTIN 100 MG: 100 CAPSULE ORAL at 08:04

## 2018-04-12 RX ADMIN — ATENOLOL 100 MG: 50 TABLET ORAL at 09:04

## 2018-04-12 RX ADMIN — DIPHENHYDRAMINE HYDROCHLORIDE 25 MG: 50 INJECTION, SOLUTION INTRAMUSCULAR; INTRAVENOUS at 08:04

## 2018-04-12 RX ADMIN — ESCITALOPRAM 20 MG: 20 TABLET, FILM COATED ORAL at 09:04

## 2018-04-12 RX ADMIN — GABAPENTIN 100 MG: 100 CAPSULE ORAL at 09:04

## 2018-04-12 NOTE — ASSESSMENT & PLAN NOTE
53 yo female s/p incisional hernia repair, colostomy take down 4/10/18    -CLD  -change to PO pain  -nausea meds PRN  -IVF d5 @ 75  -DVT prophylaxis  -PT/OT/IS

## 2018-04-12 NOTE — PLAN OF CARE
Problem: Patient Care Overview  Goal: Plan of Care Review  Outcome: Ongoing (interventions implemented as appropriate)  Plan of care reviewed with patient. Patient verbalized understanding. Patient is oriented x4. Patient remained on PCA for pain meds. Patient bonilla remained intact. Patient remained free of any falls or acute events. VSS, Will continue to monitor.

## 2018-04-12 NOTE — PLAN OF CARE
Problem: Patient Care Overview  Goal: Plan of Care Review  Outcome: Ongoing (interventions implemented as appropriate)  Plan of care reviewed with patient. Patient verbalized understanding. Patient is oriented x4. Patient received prn pain meds per PCA. Patient bonilla remained intact

## 2018-04-12 NOTE — PLAN OF CARE
Problem: Patient Care Overview  Goal: Plan of Care Review  Outcome: Ongoing (interventions implemented as appropriate)  POC reviewed with pt verbalizing understanding and no further questions at this time. AAO x4 with VSS on RA. Afebrile with Tmax 99.7 orally. ML abdominal incision with sutures intact SANFORD with left lower abdomen incision has open edges with wet-to-dry gauze. Pt denies SOB, CP, and headaches. Incisional pain being managed with oral PRN meds, see MAR. Pt ambulates independently in halls and to bathroom while remaining free of falls and injury. Pt easily falls asleep sitting up and once while sitting on toilet. Pt verbalizes her unawareness to protect IV and drain tubes. Pt has inadvertionly pulled out a abdominal LEON drain yesterday and two peripheral IVs today. Pt urged to call for assistance. Tolerating clear liquid diet with no c/o nausea. Bed in lowest position with call light within reach. WCTM

## 2018-04-12 NOTE — SUBJECTIVE & OBJECTIVE
Interval History:   Patient seen and examined, no acute events overnight  Reports some bleeding from colostomy take down site, dressing changed twice overnight  Pain well controlled with PCA  LEON drain put out 145mL overnight  Denies N/V/belching  Afebrile/VSS    Medications:  Continuous Infusions:   dextrose 5 % and 0.45 % NaCl with KCl 20 mEq 125 mL/hr at 04/12/18 0515     Scheduled Meds:   atenolol  100 mg Oral Daily    chlorthalidone  25 mg Oral Daily    enoxaparin  40 mg Subcutaneous Daily    escitalopram oxalate  20 mg Oral Daily    gabapentin  100 mg Oral BID    losartan  100 mg Oral QHS     PRN Meds:diphenhydrAMINE, morphine, ondansetron, oxyCODONE-acetaminophen, oxyCODONE-acetaminophen, promethazine (PHENERGAN) IVPB, ramelteon, sodium chloride 0.9%     Review of patient's allergies indicates:  No Known Allergies  Objective:     Vital Signs (Most Recent):  Temp: 99.3 °F (37.4 °C) (04/12/18 0322)  Pulse: 103 (04/12/18 0322)  Resp: 16 (04/12/18 0322)  BP: 116/64 (04/12/18 0322)  SpO2: 99 % (04/12/18 0322) Vital Signs (24h Range):  Temp:  [98.8 °F (37.1 °C)-100.2 °F (37.9 °C)] 99.3 °F (37.4 °C)  Pulse:  [] 103  Resp:  [16-25] 16  SpO2:  [92 %-99 %] 99 %  BP: ()/(52-85) 116/64     Weight: 99.7 kg (219 lb 12.8 oz)  Body mass index is 36.58 kg/m².    Intake/Output - Last 3 Shifts       04/10 0700 - 04/11 0659 04/11 0700 - 04/12 0659    P.O. 0 130    I.V. (mL/kg) 4403.5 (44.2) 2420.8 (24.3)    Total Intake(mL/kg) 4403.5 (44.2) 2550.8 (25.6)    Urine (mL/kg/hr) 1270 1750 (0.7)    Emesis/NG output  0 (0)    Drains 145 50 (0)    Other  0 (0)    Stool  0 (0)    Blood 300     Total Output 1715 1800    Net +2688.5 +750.8          Stool Occurrence  0 x    Emesis Occurrence  0 x          Physical Exam   Constitutional: She is oriented to person, place, and time. She appears well-developed and well-nourished. No distress.   HENT:   Head: Normocephalic and atraumatic.   Cardiovascular: Normal rate and  regular rhythm.    Pulmonary/Chest: Effort normal. No respiratory distress.   Abdominal: Tenderness: minimal, incisional.   Soft, appropriate TTP, non-distended  Surgical wounds clean, dry and intact  Ostomy reversal packing changed, wound base clean    Musculoskeletal: She exhibits no edema.   Neurological: She is alert and oriented to person, place, and time.   Skin: Skin is warm. Capillary refill takes less than 2 seconds.   Psychiatric: She has a normal mood and affect.       Significant Labs:  CBC:     Recent Labs  Lab 04/11/18  0509   WBC 13.83*   RBC 3.56*   HGB 10.7*   HCT 32.5*      MCV 91   MCH 30.1   MCHC 32.9     BMP:     Recent Labs  Lab 04/11/18  0509   *   *   K 4.3      CO2 26   BUN 13   CREATININE 0.9   CALCIUM 8.5*   MG 1.9     CMP:     Recent Labs  Lab 04/11/18  0509   *   CALCIUM 8.5*   ALBUMIN 3.3*   PROT 6.8   *   K 4.3   CO2 26      BUN 13   CREATININE 0.9   ALKPHOS 80   ALT 22   AST 19   BILITOT 1.5*     LFTs:     Recent Labs  Lab 04/11/18  0509   ALT 22   AST 19   ALKPHOS 80   BILITOT 1.5*   PROT 6.8   ALBUMIN 3.3*

## 2018-04-12 NOTE — NURSING
POC reviewed with pt who verbalizes understanding with no further questions at this time. AAO x4 with VSS on RA. Afibrile. Pt c/o itching with PCA in use. PRN benadryl administered as ordered and working well. Pt continuously gets in and out of bed due to chronic back pains. Pt inadvertently pulled out LLQ abdominal LEON drain today with MD notified. Abd ML incision CDI with Telfa dressing changed due to old drainage. Kim intact with adequate dat UOP. Pt's personal CPAP at bedside and uses when napping in day and sleep at night. Denies SOB, CP, and headaches. NPO with ice chips and sips with meds with no c/o nausea. No acute distress or needs noted at this time. Bed in lowest position with call light within reach. WCTM

## 2018-04-12 NOTE — PROGRESS NOTES
[facility]  General Surgery  Progress Note    Subjective:     Post-Op Info:  Procedure(s) (LRB):  REPAIR-HERNIA-INCISIONAL-INITIAL Open (N/A)  COLOSTOMY-REVERSAL (N/A)   2 Days Post-Op     Interval History:   Patient seen and examined, no acute events overnight  Pain controlled with PCA  LEON drain accidentally pulled  Denies N/V/belching  Afebrile/VSS- some tachycardia 100s    Medications:  Continuous Infusions:   dextrose 5 % and 0.45 % NaCl with KCl 20 mEq 125 mL/hr at 04/12/18 0515     Scheduled Meds:   atenolol  100 mg Oral Daily    chlorthalidone  25 mg Oral Daily    enoxaparin  40 mg Subcutaneous Daily    escitalopram oxalate  20 mg Oral Daily    gabapentin  100 mg Oral BID    losartan  100 mg Oral QHS     PRN Meds:diphenhydrAMINE, morphine, ondansetron, oxyCODONE-acetaminophen, oxyCODONE-acetaminophen, promethazine (PHENERGAN) IVPB, ramelteon, sodium chloride 0.9%     Review of patient's allergies indicates:  No Known Allergies  Objective:     Vital Signs (Most Recent):  Temp: 99.3 °F (37.4 °C) (04/12/18 0322)  Pulse: 103 (04/12/18 0322)  Resp: 16 (04/12/18 0322)  BP: 116/64 (04/12/18 0322)  SpO2: 99 % (04/12/18 0322) Vital Signs (24h Range):  Temp:  [98.8 °F (37.1 °C)-100.2 °F (37.9 °C)] 99.3 °F (37.4 °C)  Pulse:  [] 103  Resp:  [16-25] 16  SpO2:  [92 %-99 %] 99 %  BP: ()/(52-85) 116/64     Weight: 99.7 kg (219 lb 12.8 oz)  Body mass index is 36.58 kg/m².    Intake/Output - Last 3 Shifts       04/10 0700 - 04/11 0659 04/11 0700 - 04/12 0659    P.O. 0 130    I.V. (mL/kg) 4403.5 (44.2) 2420.8 (24.3)    Total Intake(mL/kg) 4403.5 (44.2) 2550.8 (25.6)    Urine (mL/kg/hr) 1270 1750 (0.7)    Emesis/NG output  0 (0)    Drains 145 50 (0)    Other  0 (0)    Stool  0 (0)    Blood 300     Total Output 1715 1800    Net +2688.5 +750.8          Stool Occurrence  0 x    Emesis Occurrence  0 x          Physical Exam   Constitutional: She is oriented to person, place, and time. She  appears well-developed and well-nourished. No distress.   HENT:   Head: Normocephalic and atraumatic.   Cardiovascular: Normal rate and regular rhythm.    Pulmonary/Chest: Effort normal. No respiratory distress.   Abdominal: Tenderness: minimal, incisional.   Soft, appropriate TTP, non-distended  Surgical wounds clean, dry and intact  Ostomy reversal packing changed, wound base clean    Musculoskeletal: She exhibits no edema.   Neurological: She is alert and oriented to person, place, and time.   Skin: Skin is warm. Capillary refill takes less than 2 seconds.   Psychiatric: She has a normal mood and affect.       Significant Labs:  CBC:     Recent Labs  Lab 04/11/18  0509   WBC 13.83*   RBC 3.56*   HGB 10.7*   HCT 32.5*      MCV 91   MCH 30.1   MCHC 32.9     BMP:     Recent Labs  Lab 04/11/18  0509   *   *   K 4.3      CO2 26   BUN 13   CREATININE 0.9   CALCIUM 8.5*   MG 1.9     CMP:     Recent Labs  Lab 04/11/18  0509   *   CALCIUM 8.5*   ALBUMIN 3.3*   PROT 6.8   *   K 4.3   CO2 26      BUN 13   CREATININE 0.9   ALKPHOS 80   ALT 22   AST 19   BILITOT 1.5*     LFTs:     Recent Labs  Lab 04/11/18  0509   ALT 22   AST 19   ALKPHOS 80   BILITOT 1.5*   PROT 6.8   ALBUMIN 3.3*     Assessment/Plan:     * Incisional hernia    51 yo female s/p incisional hernia repair, colostomy take down 4/10/18    -CLD  -change to PO pain  -nausea meds PRN  -IVF d5 @ 75  -DVT prophylaxis  -PT/OT/IS        Depression    Home meds  -lexapro          Hypertension    Home meds  -atenolol, chlorthalidone            Fely Washburn MD  General Surgery  [facility]

## 2018-04-12 NOTE — ANESTHESIA POSTPROCEDURE EVALUATION
"Anesthesia Post Evaluation    Patient: Barbara Ortiz    Procedure(s) Performed: Procedure(s) (LRB):  REPAIR-HERNIA-INCISIONAL-INITIAL Open (N/A)  COLOSTOMY-REVERSAL (N/A)    Final Anesthesia Type: general  Patient location during evaluation: floor  Patient participation: Yes- Able to Participate  Level of consciousness: awake and alert  Post-procedure vital signs: reviewed and stable  Pain management: adequate  Airway patency: patent  PONV status at discharge: No PONV  Anesthetic complications: no      Cardiovascular status: blood pressure returned to baseline and hemodynamically stable  Respiratory status: unassisted and spontaneous ventilation  Hydration status: euvolemic  Follow-up not needed.        Visit Vitals  BP (!) 115/55 (BP Location: Left arm, Patient Position: Sitting)   Pulse 82   Temp 37.4 °C (99.4 °F) (Oral)   Resp 18   Ht 5' 5" (1.651 m)   Wt 99.7 kg (219 lb 12.8 oz)   SpO2 98%   Breastfeeding? No   BMI 36.58 kg/m²       Pain/Olivier Score: Pain Assessment Performed: Yes (4/12/2018 12:00 PM)  Presence of Pain: denies (4/12/2018 12:00 PM)  Pain Rating Prior to Med Admin: 7 (4/12/2018  7:58 AM)  Pain Rating Post Med Admin: 3 (4/11/2018  6:26 AM)      "

## 2018-04-13 LAB
ALBUMIN SERPL BCP-MCNC: 3.1 G/DL
ALP SERPL-CCNC: 126 U/L
ALT SERPL W/O P-5'-P-CCNC: 18 U/L
ANION GAP SERPL CALC-SCNC: 8 MMOL/L
AST SERPL-CCNC: 28 U/L
BASOPHILS # BLD AUTO: 0.04 K/UL
BASOPHILS NFR BLD: 0.2 %
BILIRUB SERPL-MCNC: 1.6 MG/DL
BUN SERPL-MCNC: 10 MG/DL
C DIFF GDH STL QL: NEGATIVE
C DIFF TOX A+B STL QL IA: NEGATIVE
CALCIUM SERPL-MCNC: 8.9 MG/DL
CHLORIDE SERPL-SCNC: 101 MMOL/L
CO2 SERPL-SCNC: 22 MMOL/L
CREAT SERPL-MCNC: 1.1 MG/DL
DIFFERENTIAL METHOD: ABNORMAL
EOSINOPHIL # BLD AUTO: 0.2 K/UL
EOSINOPHIL NFR BLD: 1.2 %
ERYTHROCYTE [DISTWIDTH] IN BLOOD BY AUTOMATED COUNT: 14.4 %
EST. GFR  (AFRICAN AMERICAN): >60 ML/MIN/1.73 M^2
EST. GFR  (NON AFRICAN AMERICAN): 58.7 ML/MIN/1.73 M^2
GLUCOSE SERPL-MCNC: 117 MG/DL
HCT VFR BLD AUTO: 27.4 %
HGB BLD-MCNC: 9.1 G/DL
IMM GRANULOCYTES # BLD AUTO: 0.11 K/UL
IMM GRANULOCYTES NFR BLD AUTO: 0.7 %
LYMPHOCYTES # BLD AUTO: 1.5 K/UL
LYMPHOCYTES NFR BLD: 9.5 %
MAGNESIUM SERPL-MCNC: 2.5 MG/DL
MCH RBC QN AUTO: 30.6 PG
MCHC RBC AUTO-ENTMCNC: 33.2 G/DL
MCV RBC AUTO: 92 FL
MONOCYTES # BLD AUTO: 1.1 K/UL
MONOCYTES NFR BLD: 6.5 %
NEUTROPHILS # BLD AUTO: 13.2 K/UL
NEUTROPHILS NFR BLD: 81.9 %
NRBC BLD-RTO: 0 /100 WBC
PHOSPHATE SERPL-MCNC: 2.3 MG/DL
PLATELET # BLD AUTO: 192 K/UL
PMV BLD AUTO: 10.1 FL
POTASSIUM SERPL-SCNC: 4.6 MMOL/L
PROT SERPL-MCNC: 7.4 G/DL
RBC # BLD AUTO: 2.97 M/UL
SODIUM SERPL-SCNC: 131 MMOL/L
WBC # BLD AUTO: 16.14 K/UL

## 2018-04-13 PROCEDURE — 20600001 HC STEP DOWN PRIVATE ROOM

## 2018-04-13 PROCEDURE — 63600175 PHARM REV CODE 636 W HCPCS: Performed by: SURGERY

## 2018-04-13 PROCEDURE — 84100 ASSAY OF PHOSPHORUS: CPT

## 2018-04-13 PROCEDURE — 25000003 PHARM REV CODE 250: Performed by: STUDENT IN AN ORGANIZED HEALTH CARE EDUCATION/TRAINING PROGRAM

## 2018-04-13 PROCEDURE — 36415 COLL VENOUS BLD VENIPUNCTURE: CPT

## 2018-04-13 PROCEDURE — 94761 N-INVAS EAR/PLS OXIMETRY MLT: CPT

## 2018-04-13 PROCEDURE — G8989 SELF CARE D/C STATUS: HCPCS | Mod: CJ

## 2018-04-13 PROCEDURE — 87449 NOS EACH ORGANISM AG IA: CPT

## 2018-04-13 PROCEDURE — 97161 PT EVAL LOW COMPLEX 20 MIN: CPT

## 2018-04-13 PROCEDURE — G8988 SELF CARE GOAL STATUS: HCPCS | Mod: CJ

## 2018-04-13 PROCEDURE — 63600175 PHARM REV CODE 636 W HCPCS: Performed by: STUDENT IN AN ORGANIZED HEALTH CARE EDUCATION/TRAINING PROGRAM

## 2018-04-13 PROCEDURE — G8987 SELF CARE CURRENT STATUS: HCPCS | Mod: CJ

## 2018-04-13 PROCEDURE — 25000003 PHARM REV CODE 250: Performed by: SURGERY

## 2018-04-13 PROCEDURE — 97535 SELF CARE MNGMENT TRAINING: CPT

## 2018-04-13 PROCEDURE — 85025 COMPLETE CBC W/AUTO DIFF WBC: CPT

## 2018-04-13 PROCEDURE — 97165 OT EVAL LOW COMPLEX 30 MIN: CPT

## 2018-04-13 PROCEDURE — 83735 ASSAY OF MAGNESIUM: CPT

## 2018-04-13 PROCEDURE — 80053 COMPREHEN METABOLIC PANEL: CPT

## 2018-04-13 PROCEDURE — 97530 THERAPEUTIC ACTIVITIES: CPT

## 2018-04-13 RX ADMIN — GABAPENTIN 100 MG: 100 CAPSULE ORAL at 08:04

## 2018-04-13 RX ADMIN — Medication 4 MG: at 08:04

## 2018-04-13 RX ADMIN — OXYCODONE HYDROCHLORIDE AND ACETAMINOPHEN 1 TABLET: 10; 325 TABLET ORAL at 12:04

## 2018-04-13 RX ADMIN — LOSARTAN POTASSIUM 100 MG: 50 TABLET, FILM COATED ORAL at 09:04

## 2018-04-13 RX ADMIN — SODIUM PHOSPHATE, MONOBASIC, MONOHYDRATE 15 MMOL: 276; 142 INJECTION, SOLUTION INTRAVENOUS at 12:04

## 2018-04-13 RX ADMIN — ATENOLOL 100 MG: 50 TABLET ORAL at 08:04

## 2018-04-13 RX ADMIN — GABAPENTIN 100 MG: 100 CAPSULE ORAL at 09:04

## 2018-04-13 RX ADMIN — OXYCODONE HYDROCHLORIDE AND ACETAMINOPHEN 1 TABLET: 10; 325 TABLET ORAL at 09:04

## 2018-04-13 RX ADMIN — OXYCODONE HYDROCHLORIDE AND ACETAMINOPHEN 1 TABLET: 5; 325 TABLET ORAL at 07:04

## 2018-04-13 RX ADMIN — ENOXAPARIN SODIUM 40 MG: 100 INJECTION SUBCUTANEOUS at 06:04

## 2018-04-13 RX ADMIN — DEXTROSE MONOHYDRATE, SODIUM CHLORIDE, AND POTASSIUM CHLORIDE: 50; 4.5; 1.49 INJECTION, SOLUTION INTRAVENOUS at 04:04

## 2018-04-13 RX ADMIN — DIPHENHYDRAMINE HYDROCHLORIDE 25 MG: 50 INJECTION, SOLUTION INTRAMUSCULAR; INTRAVENOUS at 08:04

## 2018-04-13 RX ADMIN — OXYCODONE HYDROCHLORIDE AND ACETAMINOPHEN 1 TABLET: 5; 325 TABLET ORAL at 12:04

## 2018-04-13 RX ADMIN — ESCITALOPRAM 20 MG: 20 TABLET, FILM COATED ORAL at 08:04

## 2018-04-13 RX ADMIN — CHLORTHALIDONE 25 MG: 25 TABLET ORAL at 08:04

## 2018-04-13 RX ADMIN — OXYCODONE HYDROCHLORIDE AND ACETAMINOPHEN 1 TABLET: 10; 325 TABLET ORAL at 06:04

## 2018-04-13 NOTE — PLAN OF CARE
Problem: Physical Therapy Goal  Goal: Physical Therapy Goal  Outcome: Outcome(s) achieved Date Met: 04/13/18  PT D/Rajesh due to pt able to perform functional mobility as needed for D/C; nurse and MD notified. Yaa Mosqueda PT 4/13/18

## 2018-04-13 NOTE — SUBJECTIVE & OBJECTIVE
Interval History:   No acute events overnight. Febrile to 101.9. Leaking from inferior portion of incision. Continued lower abdominal pain wit urination.  Ambulating, no nausea, (+) flatus and BM    Medications:  Continuous Infusions:   dextrose 5 % and 0.45 % NaCl with KCl 20 mEq 100 mL/hr at 04/13/18 0407     Scheduled Meds:   atenolol  100 mg Oral Daily    chlorthalidone  25 mg Oral Daily    enoxaparin  40 mg Subcutaneous Daily    escitalopram oxalate  20 mg Oral Daily    gabapentin  100 mg Oral BID    losartan  100 mg Oral QHS     PRN Meds:diphenhydrAMINE, morphine, ondansetron, oxyCODONE-acetaminophen, oxyCODONE-acetaminophen, promethazine (PHENERGAN) IVPB, ramelteon, sodium chloride 0.9%     Review of patient's allergies indicates:  No Known Allergies  Objective:     Vital Signs (Most Recent):  Temp: 98.4 °F (36.9 °C) (04/13/18 0520)  Pulse: 71 (04/13/18 0520)  Resp: 18 (04/13/18 0520)  BP: (!) 118/59 (04/13/18 0520)  SpO2: 98 % (04/13/18 0520) Vital Signs (24h Range):  Temp:  [98.4 °F (36.9 °C)-101.8 °F (38.8 °C)] 98.4 °F (36.9 °C)  Pulse:  [] 71  Resp:  [16-18] 18  SpO2:  [94 %-100 %] 98 %  BP: (101-137)/(54-66) 118/59     Weight: 99.7 kg (219 lb 12.8 oz)  Body mass index is 36.58 kg/m².    Intake/Output - Last 3 Shifts       04/11 0700 - 04/12 0659 04/12 0700 - 04/13 0659    P.O. 130 1550    I.V. (mL/kg) 2420.8 (24.3) 2158.8 (21.7)    Total Intake(mL/kg) 2550.8 (25.6) 3708.8 (37.2)    Urine (mL/kg/hr) 1750 (0.7) 1150 (0.5)    Emesis/NG output 0 (0) 0 (0)    Drains 50 (0)     Other 0 (0) 0 (0)    Stool 0 (0) 0 (0)    Total Output 1800 1150    Net +750.8 +2558.8          Urine Occurrence  1 x    Stool Occurrence 0 x 5 x    Emesis Occurrence 0 x 0 x          Physical Exam   Constitutional: She is oriented to person, place, and time. She appears well-developed and well-nourished. No distress.   HENT:   Head: Normocephalic and atraumatic.   Cardiovascular: Normal rate and regular rhythm.     Pulmonary/Chest: Effort normal. No respiratory distress.   Abdominal: Tenderness: minimal, incisional.   Soft, TTP RLQ and midline, non-distended  Inferior portion of incision opened, with drainage of purulence, packed with gauze  Ostomy reversal packing changed, wound base clean    Musculoskeletal: She exhibits no edema.   Neurological: She is alert and oriented to person, place, and time.   Skin: Skin is warm. Capillary refill takes less than 2 seconds.   Psychiatric: She has a normal mood and affect.       Significant Labs:  CBC:     Recent Labs  Lab 04/12/18  0408   WBC 16.36*   RBC 3.43*   HGB 10.4*   HCT 30.5*      MCV 89   MCH 30.3   MCHC 34.1     BMP:     Recent Labs  Lab 04/12/18  0408   *   *   K 4.1   CL 99   CO2 25   BUN 10   CREATININE 0.9   CALCIUM 8.5*   MG 2.2     CMP:     Recent Labs  Lab 04/12/18  0408   *   CALCIUM 8.5*   ALBUMIN 3.2*   PROT 7.0   *   K 4.1   CO2 25   CL 99   BUN 10   CREATININE 0.9   ALKPHOS 83   ALT 16   AST 19   BILITOT 1.8*     LFTs:     Recent Labs  Lab 04/12/18  0408   ALT 16   AST 19   ALKPHOS 83   BILITOT 1.8*   PROT 7.0   ALBUMIN 3.2*

## 2018-04-13 NOTE — PLAN OF CARE
Problem: Occupational Therapy Goal  Goal: Occupational Therapy Goal  Outcome: Outcome(s) achieved Date Met: 04/13/18  Eval completed; no OT needs this date

## 2018-04-13 NOTE — PT/OT/SLP EVAL
Occupational Therapy   Evaluation and Discharge Note    Name: Barbara Ortiz  MRN: [id number]  Admitting Diagnosis:  Incisional hernia 3 Days Post-Op    Recommendations:     Discharge Recommendations: home (no OT needs)  Discharge Equipment Recommendations:  bath bench  Barriers to discharge:  None    History:     Occupational Profile:  Living Environment: Pt lives with friend/fiancee in 1SH with 2STE front door, but pt reports using back entrance with 5STE and BHR. Home has tub/shower combo   Previous level of function: PTA, pt reports independence with all ADL and IADL, including driving. Pt was not using any AD for ambulation or ADLs. Pt reports having difficulty with toilet t/f at home 2/2 back problems and complication with most recent back sx. Pt states she cooks and washes dishes, but friend/fiancee completes laundry and more fatiguing housework 2/2 pt back problems and lifting restrictions. Pt states she sometimes goes to the store around the corner, but mostly completes grocery shopping with her friend/fiancee present. Pt spends most days at home  Roles and Routines: daughter, sister, aunt  Equipment Owned:  CPAP (has access to bedside commode)  Assistance upon Discharge: Pt states her friend/fiancee works during the day, however her aunt and niece are available to assist during the day as needed    Past Medical History:   Diagnosis Date    Anxiety     Diverticulitis     Fibromyalgia     Hypertension     Sleep apnea        Past Surgical History:   Procedure Laterality Date    CARPAL TUNNEL RELEASE Right     CHOLECYSTECTOMY      COLOSTOMY      LUMBAR FUSION      PARTIAL HYSTERECTOMY  2002       Subjective     Chief Complaint: pain  Patient/Family stated goals: none  Communicated with: RN prior to session.  Pain/Comfort:  · Pain Rating 1: 7/10  · Location - Side 1: Bilateral  · Location - Orientation 1: generalized  · Location 1: abdomen  · Pain Addressed 1: Reposition, Distraction  · Pain Rating  Post-Intervention 1: 7/10    Patients cultural, spiritual, Lutheran conflicts given the current situation: none reported    Objective:     Patient found with: peripheral IV    General Precautions: Standard, fall   Orthopedic Precautions:N/A   Braces: N/A     Occupational Performance:    Bed Mobility:    · Patient completed Scooting/Bridging with supervision  · Patient completed Supine to Sit with supervision  · Patient completed Sit to Supine with supervision    Functional Mobility/Transfers:  · Patient completed Sit <> Stand Transfer with supervision  with  no assistive device   · Patient completed Toilet Transfer Step Transfer technique with supervision with  no AD  · Functional Mobility: Pt ambulate bed<>bathroom with supervision using no AD    Activities of Daily Living:  · Grooming: supervision standing at sink to wash hands  · LB Dressing: independence to manage B socks while seated EOB  · Toileting: supervision for clothing management and rory cleaning    Cognitive/Visual Perceptual:  Cognitive/Psychosocial Skills:     -       Oriented to: Person, Place, Time and Situation   -       Follows Commands/attention:Follows multistep  commands  -       Communication: clear/fluent  -       Memory: No Deficits noted  -       Safety awareness/insight to disability: intact   -       Mood/Affect/Coping skills/emotional control: Appropriate to situation  Visual/Perceptual:      -Intact    Physical Exam:  Balance:    -       good sitting balance, good static/dyanmic standing balance   Postural examination/scapula alignment:    -       No postural abnormalities identified  Skin integrity: Visible skin intact  Edema:  None noted  Sensation:    -       Impaired  Pt reports numbness/tingling in BLE 2/2 back problems  Dominant hand:    -       R hand  Upper Extremity Range of Motion:     -       Right Upper Extremity: WFL  -       Left Upper Extremity: WFL  Upper Extremity Strength:    -       Right Upper Extremity: WFL  -     "   Left Upper Extremity: WFL   Strength:    -       Right Upper Extremity: WFL  -       Left Upper Extremity: WFL  Fine Motor Coordination:    -       Intact  Gross motor coordination:   WFL    Patient left supine with all lines intact and call button in reach    [facility] 6 Click:  [facility] Total Score: 22    Treatment & Education:  Pt educated on role of OT/POC  White board/communication board updated  Education:    Assessment:     Barbara Ortiz is a 52 y.o. female with a medical diagnosis of Incisional hernia. At this time, patient is functioning at their prior level of function and does not require further acute OT services.     Clinical Decision Makin.  OT Low:  "Pt evaluation falls under low complexity for evaluation coding due to performance deficits noted in 1-3 areas as stated above and 0 co-morbities affecting current functional status. Data obtained from problem focused assessments. No modifications or assistance was required for completion of evaluation. Only brief occupational profile and history review completed."     Plan:     During this hospitalization, patient does not require further acute OT services.  Please re-consult if situation changes.    · Plan of Care Reviewed with: patient    This Plan of care has been discussed with the patient who was involved in its development and understands and is in agreement with the identified goals and treatment plan    GOALS:    Occupational Therapy Goals     Not on file          Multidisciplinary Problems (Resolved)        Problem: Occupational Therapy Goal    Goal Priority Disciplines Outcome Interventions   Occupational Therapy Goal   (Resolved)     OT, PT/OT Outcome(s) achieved                    Time Tracking:     OT Date of Treatment: 18  OT Start Time: 1245  OT Stop Time: 1303  OT Total Time (min): 18 min    Billable Minutes:Evaluation 10  Self Care/Home Management 8    Lorrie Whitten OT  2018    "

## 2018-04-13 NOTE — PT/OT/SLP EVAL
"Physical Therapy Evaluation    Patient Name:  Barbara Ortiz   MRN:  [id number]    Recommendations:     Discharge Recommendations:  home   Discharge Equipment Recommendations: none   Barriers to discharge: None    Assessment:     Barbara Ortiz is a 52 y.o. female admitted with a medical diagnosis of Incisional hernia.  She presents with the following impairments/functional limitations:  pain .    Recent Surgery: Procedure(s) (LRB):  REPAIR-HERNIA-INCISIONAL-INITIAL Open (N/A)  COLOSTOMY-REVERSAL (N/A) 3 Days Post-Op    Plan:      (D/C PT due to pt able to perform functional mobility)   · Plan of Care Expires:  05/13/18   Plan of Care Reviewed with: patient    Subjective     Communicated with nurse prior to session.  Patient found supine upon PT entry to room, agreeable to evaluation.    "I have back problems, I have had a back fusion"  Pain/Comfort:  · Pain Rating 1: 5/10  · Location - Orientation 1: generalized  · Location 1: abdomen  · Pain Addressed 1: Pre-medicate for activity, Reposition, Cessation of Activity  · Pain Rating Post-Intervention 1: 5/10    Patients cultural, spiritual, Uatsdin conflicts given the current situation: none noted    Living Environment:  Pt lives with her friend in a 1 story home with 4 steps to enter with B UE rails.   Prior to admission, patients level of function was independent.  Patient has the following equipment: CPAP.  Upon discharge, patient will have assistance from friend who works during the day.    Objective:     Patient found with: peripheral IV     General Precautions: Standard, fall   Orthopedic Precautions:N/A   Braces: N/A     Exams:  · Cognitive Exam:  Patient is oriented to Person, Place, Time and Situation and follows 100% of multistep commands   · Postural Exam:  Patient presented with the following abnormalities:    · -       Rounded shoulders  · -       Forward head  · Sensation:    · -       Intact  light/touch B LE  · RLE ROM: WFL  · RLE Strength: WFL except hip " flex at least 3/5  · LLE ROM: WFL  · LLE Strength: WFL except hip flex at least 3/5    Functional Mobility:  · Bed Mobility:   Pt educated to log roll prior to coming to sit  · Rolling Right: stand by assistance  · Supine to Sit: contact guard assistance  · Transfers:     · Sit to Stand:  supervision with no AD; 1 from bed and 1 from commode  · Gait: 300ft with no AD with supervision to push IV pole. pt required 2 standing rest periods for ~ 20 sec each time due to pain in her lower abd. No instability noted.    AM-PAC 6 CLICK MOBILITY  Total Score:22   Therapeutic Activities and Exercises:  Pt ambulated to the bathroom and urinated, independent with cleaning herself. Pt educated to ambulate 3 xs/day in the conde, nurse notified    Patient left up in chair with all lines intact, call button in reach and nurse notified.    GOALS:    Physical Therapy Goals     Not on file          Multidisciplinary Problems (Resolved)        Problem: Physical Therapy Goal    Goal Priority Disciplines Outcome Goal Variances Interventions   Physical Therapy Goal   (Resolved)     PT/OT, PT Outcome(s) achieved                     History:     Past Medical History:   Diagnosis Date    Anxiety     Diverticulitis     Fibromyalgia     Hypertension     Sleep apnea        Past Surgical History:   Procedure Laterality Date    CARPAL TUNNEL RELEASE Right     CHOLECYSTECTOMY      COLOSTOMY      LUMBAR FUSION      PARTIAL HYSTERECTOMY  2002       Clinical Decision Making:     History  Co-morbidities and personal factors that may impact the plan of care Examination  Body Structures and Functions, activity limitations and participation restrictions that may impact the plan of care Clinical Presentation   Decision Making/ Complexity Score   Co-morbidities:   [] Time since onset of injury / illness / exacerbation  [] Status of current condition  []Patient's cognitive status and safety concerns    [] Multiple Medical Problems (see med  hx)  Personal Factors:   [] Patient's age  [] Prior Level of function   [] Patient's home situation (environment and family support)  [] Patient's level of motivation  [] Expected progression of patient      HISTORY:(criteria)    [] 21488 - no personal factors/history    [] 02156 - has 1-2 personal factor/comorbidity     [] 49647 - has >3 personal factor/comorbidity     Body Regions:  [] Objective examination findings  [] Head     []  Neck  [] Trunk   [] Upper Extremity  [] Lower Extremity    Body Systems:  [] For communication ability, affect, cognition, language, and learning style: the assessment of the ability to make needs known, consciousness, orientation (person, place, and time), expected emotional /behavioral responses, and learning preferences (eg, learning barriers, education  needs)  [] For the neuromuscular system: a general assessment of gross coordinated movement (eg, balance, gait, locomotion, transfers, and transitions) and motor function  (motor control and motor learning)  [] For the musculoskeletal system: the assessment of gross symmetry, gross range of motion, gross strength, height, and weight  [] For the integumentary system: the assessment of pliability(texture), presence of scar formation, skin color, and skin integrity  [] For cardiovascular/pulmonary system: the assessment of heart rate, respiratory rate, blood pressure, and edema     Activity limitations:    [] Patient's cognitive status and saf ety concerns          [] Status of current condition      [] Weight bearing restriction  [] Cardiopulmunary Restriction    Participation Restrictions:   [] Goals and goal agreement with the patient     [] Rehab potential (prognosis) and probable outcome      Examination of Body System: (criteria)    [] 59459 - addressing 1-2 elements    [] 54186 - addressing a total of 3 or more elements     [] 51079 -  Addressing a total of 4 or more elements         Clinical Presentation: (criteria)  Choose  one     On examination of body system using standardized tests and measures patient presents with (CHOOSE ONE) elements from any of the following: body structures and functions, activity limitations, and/or participation restrictions.  Leading to a clinical presentation that is considered (CHOOSE ONE)                              Clinical Decision Making  (Eval Complexity):  Low- 25890     Time Tracking:     PT Received On: 04/13/18  PT Start Time: 0935     PT Stop Time: 0957  PT Total Time (min): 22 min     Billable Minutes: Evaluation 12 and Therapeutic Activity 10      Yaa Mosqueda, PT  04/13/2018

## 2018-04-13 NOTE — ASSESSMENT & PLAN NOTE
51 yo female s/p incisional hernia repair, colostomy take down 4/10/18    -fulls  --cipro/flagyl for SSI, change packing daily  -PO pain  -nausea meds PRN  -IVF d5 @ 75  -DVT prophylaxis  -PT/OT/IS

## 2018-04-13 NOTE — PLAN OF CARE
Problem: Patient Care Overview  Goal: Plan of Care Review  Outcome: Ongoing (interventions implemented as appropriate)  Plan of care reviewed with patient. Patient verbalized understanding. Patient is oriented x4. Patient tolerated diet. Patient voided in hat. Patient incisions remained intact. Patient ambulated in hallway without any issues. Patient received prn pain meds per Mar. Patient remained free of any falls or acute events. VSS, Will continue to monitor.

## 2018-04-14 PROBLEM — T81.49XA WOUND INFECTION AFTER SURGERY: Status: ACTIVE | Noted: 2018-04-14

## 2018-04-14 LAB
ALBUMIN SERPL BCP-MCNC: 2.7 G/DL
ALP SERPL-CCNC: 98 U/L
ALT SERPL W/O P-5'-P-CCNC: 17 U/L
ANION GAP SERPL CALC-SCNC: 8 MMOL/L
AST SERPL-CCNC: 22 U/L
BACTERIA #/AREA URNS AUTO: NORMAL /HPF
BASOPHILS # BLD AUTO: 0.03 K/UL
BASOPHILS NFR BLD: 0.2 %
BILIRUB SERPL-MCNC: 1.4 MG/DL
BILIRUB UR QL STRIP: NEGATIVE
BUN SERPL-MCNC: 4 MG/DL
CALCIUM SERPL-MCNC: 8.4 MG/DL
CHLORIDE SERPL-SCNC: 98 MMOL/L
CLARITY UR REFRACT.AUTO: CLEAR
CO2 SERPL-SCNC: 26 MMOL/L
COLOR UR AUTO: YELLOW
CREAT SERPL-MCNC: 1.1 MG/DL
DIFFERENTIAL METHOD: ABNORMAL
EOSINOPHIL # BLD AUTO: 0.3 K/UL
EOSINOPHIL NFR BLD: 2.2 %
ERYTHROCYTE [DISTWIDTH] IN BLOOD BY AUTOMATED COUNT: 14.2 %
EST. GFR  (AFRICAN AMERICAN): >60 ML/MIN/1.73 M^2
EST. GFR  (NON AFRICAN AMERICAN): 58.7 ML/MIN/1.73 M^2
GLUCOSE SERPL-MCNC: 152 MG/DL
GLUCOSE UR QL STRIP: NEGATIVE
HCT VFR BLD AUTO: 26.2 %
HGB BLD-MCNC: 8.4 G/DL
HGB UR QL STRIP: ABNORMAL
HYALINE CASTS UR QL AUTO: 1 /LPF
IMM GRANULOCYTES # BLD AUTO: 0.09 K/UL
IMM GRANULOCYTES NFR BLD AUTO: 0.6 %
KETONES UR QL STRIP: NEGATIVE
LEUKOCYTE ESTERASE UR QL STRIP: ABNORMAL
LYMPHOCYTES # BLD AUTO: 1.5 K/UL
LYMPHOCYTES NFR BLD: 10.5 %
MAGNESIUM SERPL-MCNC: 1.8 MG/DL
MCH RBC QN AUTO: 29.6 PG
MCHC RBC AUTO-ENTMCNC: 32.1 G/DL
MCV RBC AUTO: 92 FL
MICROSCOPIC COMMENT: NORMAL
MONOCYTES # BLD AUTO: 1.1 K/UL
MONOCYTES NFR BLD: 7.5 %
NEUTROPHILS # BLD AUTO: 11.3 K/UL
NEUTROPHILS NFR BLD: 79 %
NITRITE UR QL STRIP: NEGATIVE
NRBC BLD-RTO: 0 /100 WBC
PH UR STRIP: 6 [PH] (ref 5–8)
PHOSPHATE SERPL-MCNC: 3.1 MG/DL
PLATELET # BLD AUTO: 242 K/UL
PMV BLD AUTO: 10.1 FL
POTASSIUM SERPL-SCNC: 3.6 MMOL/L
PROT SERPL-MCNC: 6.4 G/DL
PROT UR QL STRIP: NEGATIVE
RBC # BLD AUTO: 2.84 M/UL
RBC #/AREA URNS AUTO: 1 /HPF (ref 0–4)
SODIUM SERPL-SCNC: 132 MMOL/L
SP GR UR STRIP: 1 (ref 1–1.03)
SQUAMOUS #/AREA URNS AUTO: 0 /HPF
URN SPEC COLLECT METH UR: ABNORMAL
UROBILINOGEN UR STRIP-ACNC: 2 EU/DL
WBC # BLD AUTO: 14.35 K/UL
WBC #/AREA URNS AUTO: 1 /HPF (ref 0–5)
WBC CLUMPS UR QL AUTO: NORMAL

## 2018-04-14 PROCEDURE — 25000003 PHARM REV CODE 250: Performed by: STUDENT IN AN ORGANIZED HEALTH CARE EDUCATION/TRAINING PROGRAM

## 2018-04-14 PROCEDURE — 80053 COMPREHEN METABOLIC PANEL: CPT

## 2018-04-14 PROCEDURE — 36415 COLL VENOUS BLD VENIPUNCTURE: CPT

## 2018-04-14 PROCEDURE — 85025 COMPLETE CBC W/AUTO DIFF WBC: CPT

## 2018-04-14 PROCEDURE — 81001 URINALYSIS AUTO W/SCOPE: CPT

## 2018-04-14 PROCEDURE — 25000003 PHARM REV CODE 250: Performed by: SURGERY

## 2018-04-14 PROCEDURE — 63600175 PHARM REV CODE 636 W HCPCS: Performed by: SURGERY

## 2018-04-14 PROCEDURE — 83735 ASSAY OF MAGNESIUM: CPT

## 2018-04-14 PROCEDURE — 84100 ASSAY OF PHOSPHORUS: CPT

## 2018-04-14 PROCEDURE — 94761 N-INVAS EAR/PLS OXIMETRY MLT: CPT

## 2018-04-14 PROCEDURE — 20600001 HC STEP DOWN PRIVATE ROOM

## 2018-04-14 PROCEDURE — 63600175 PHARM REV CODE 636 W HCPCS: Performed by: STUDENT IN AN ORGANIZED HEALTH CARE EDUCATION/TRAINING PROGRAM

## 2018-04-14 PROCEDURE — S0030 INJECTION, METRONIDAZOLE: HCPCS | Performed by: STUDENT IN AN ORGANIZED HEALTH CARE EDUCATION/TRAINING PROGRAM

## 2018-04-14 RX ORDER — METRONIDAZOLE 500 MG/100ML
500 INJECTION, SOLUTION INTRAVENOUS
Status: DISCONTINUED | OUTPATIENT
Start: 2018-04-14 | End: 2018-04-16

## 2018-04-14 RX ORDER — CIPROFLOXACIN 2 MG/ML
400 INJECTION, SOLUTION INTRAVENOUS
Status: DISCONTINUED | OUTPATIENT
Start: 2018-04-14 | End: 2018-04-16

## 2018-04-14 RX ADMIN — DIPHENHYDRAMINE HYDROCHLORIDE 25 MG: 50 INJECTION, SOLUTION INTRAMUSCULAR; INTRAVENOUS at 05:04

## 2018-04-14 RX ADMIN — OXYCODONE HYDROCHLORIDE AND ACETAMINOPHEN 1 TABLET: 10; 325 TABLET ORAL at 08:04

## 2018-04-14 RX ADMIN — OXYCODONE HYDROCHLORIDE AND ACETAMINOPHEN 1 TABLET: 10; 325 TABLET ORAL at 11:04

## 2018-04-14 RX ADMIN — ESCITALOPRAM 20 MG: 20 TABLET, FILM COATED ORAL at 08:04

## 2018-04-14 RX ADMIN — DEXTROSE MONOHYDRATE, SODIUM CHLORIDE, AND POTASSIUM CHLORIDE: 50; 4.5; 1.49 INJECTION, SOLUTION INTRAVENOUS at 02:04

## 2018-04-14 RX ADMIN — OXYCODONE HYDROCHLORIDE AND ACETAMINOPHEN 1 TABLET: 10; 325 TABLET ORAL at 06:04

## 2018-04-14 RX ADMIN — METRONIDAZOLE 500 MG: 500 INJECTION, SOLUTION INTRAVENOUS at 04:04

## 2018-04-14 RX ADMIN — METRONIDAZOLE 500 MG: 500 INJECTION, SOLUTION INTRAVENOUS at 08:04

## 2018-04-14 RX ADMIN — OXYCODONE HYDROCHLORIDE AND ACETAMINOPHEN 1 TABLET: 10; 325 TABLET ORAL at 01:04

## 2018-04-14 RX ADMIN — CIPROFLOXACIN 400 MG: 2 INJECTION, SOLUTION INTRAVENOUS at 08:04

## 2018-04-14 RX ADMIN — CIPROFLOXACIN 400 MG: 2 INJECTION, SOLUTION INTRAVENOUS at 09:04

## 2018-04-14 RX ADMIN — ATENOLOL 100 MG: 50 TABLET ORAL at 08:04

## 2018-04-14 RX ADMIN — DEXTROSE MONOHYDRATE, SODIUM CHLORIDE, AND POTASSIUM CHLORIDE: 50; 4.5; 1.49 INJECTION, SOLUTION INTRAVENOUS at 01:04

## 2018-04-14 RX ADMIN — CHLORTHALIDONE 25 MG: 25 TABLET ORAL at 08:04

## 2018-04-14 RX ADMIN — GABAPENTIN 100 MG: 100 CAPSULE ORAL at 07:04

## 2018-04-14 RX ADMIN — ENOXAPARIN SODIUM 40 MG: 100 INJECTION SUBCUTANEOUS at 04:04

## 2018-04-14 RX ADMIN — METRONIDAZOLE 500 MG: 500 INJECTION, SOLUTION INTRAVENOUS at 11:04

## 2018-04-14 RX ADMIN — OXYCODONE HYDROCHLORIDE AND ACETAMINOPHEN 1 TABLET: 10; 325 TABLET ORAL at 02:04

## 2018-04-14 RX ADMIN — GABAPENTIN 100 MG: 100 CAPSULE ORAL at 08:04

## 2018-04-14 RX ADMIN — Medication 4 MG: at 07:04

## 2018-04-14 NOTE — PLAN OF CARE
Problem: Patient Care Overview  Goal: Plan of Care Review  Outcome: Ongoing (interventions implemented as appropriate)   Plan of care reviewed with the  Patient, a 52 year old female S/P incisional hernia repair,,,, had the hernia repaired and a reversal on the tenth of this month,,,, midline with staples SANFORD,, old LEON site packed,,,, reversal site covered with gauze and tegaderm,,, left forearm 20 gauge,,, clear liquid diet,,, up ad gala with assist,,, painful urination episodes,,, one BM during the night,,, pain controlled with PRN meds,,, bed locked and low, call light in reach,,,,

## 2018-04-14 NOTE — PLAN OF CARE
Problem: Patient Care Overview  Goal: Plan of Care Review  Outcome: Ongoing (interventions implemented as appropriate)  POC reviewed with pt verbalizing understanding and no further questions at this time. AAO x4 with VSS on RA. Afebrile with Tmax 99.9 orally. ML abdominal incision with sutures intact SANFORD with left lower abdomen incision has open edges with wet-to-dry gauze. Pt denies SOB, CP, and headaches. Incisional pain being managed with oral PRN meds, see MAR. Pt ambulates independently in halls and to bathroom while remaining free of falls and injury. Pt in more aware of movement to prevent IV or drains from accidentally removed. Pt verbalizes her unawareness to protect IV and drain tubes. Pt urged to call for assistance. Advanced and tolerating full liquid diet with no c/o nausea. Bed in lowest position with call light within reach. WCTM

## 2018-04-14 NOTE — PLAN OF CARE
Problem: Patient Care Overview  Goal: Plan of Care Review  Outcome: Ongoing (interventions implemented as appropriate)  Plan of care reviewed with the patient,, a 52 year old female S/P incisional hernia repair,,, she had a hernia repair and reversal of

## 2018-04-14 NOTE — PROGRESS NOTES
[facility]  General Surgery  Progress Note    Subjective:       Post-Op Info:  Procedure(s) (LRB):  REPAIR-HERNIA-INCISIONAL-INITIAL Open (N/A)  COLOSTOMY-REVERSAL (N/A)   4 Days Post-Op     Interval History:   No acute events overnight. Febrile to 101.9. Leaking from inferior portion of incision. Continued lower abdominal pain wit urination.  Ambulating, no nausea, (+) flatus and BM    Medications:  Continuous Infusions:   dextrose 5 % and 0.45 % NaCl with KCl 20 mEq 100 mL/hr at 04/13/18 0407     Scheduled Meds:   atenolol  100 mg Oral Daily    chlorthalidone  25 mg Oral Daily    enoxaparin  40 mg Subcutaneous Daily    escitalopram oxalate  20 mg Oral Daily    gabapentin  100 mg Oral BID    losartan  100 mg Oral QHS     PRN Meds:diphenhydrAMINE, morphine, ondansetron, oxyCODONE-acetaminophen, oxyCODONE-acetaminophen, promethazine (PHENERGAN) IVPB, ramelteon, sodium chloride 0.9%     Review of patient's allergies indicates:  No Known Allergies  Objective:     Vital Signs (Most Recent):  Temp: 98.4 °F (36.9 °C) (04/13/18 0520)  Pulse: 71 (04/13/18 0520)  Resp: 18 (04/13/18 0520)  BP: (!) 118/59 (04/13/18 0520)  SpO2: 98 % (04/13/18 0520) Vital Signs (24h Range):  Temp:  [98.4 °F (36.9 °C)-101.8 °F (38.8 °C)] 98.4 °F (36.9 °C)  Pulse:  [] 71  Resp:  [16-18] 18  SpO2:  [94 %-100 %] 98 %  BP: (101-137)/(54-66) 118/59     Weight: 99.7 kg (219 lb 12.8 oz)  Body mass index is 36.58 kg/m².    Intake/Output - Last 3 Shifts       04/11 0700 - 04/12 0659 04/12 0700 - 04/13 0659    P.O. 130 1550    I.V. (mL/kg) 2420.8 (24.3) 2158.8 (21.7)    Total Intake(mL/kg) 2550.8 (25.6) 3708.8 (37.2)    Urine (mL/kg/hr) 1750 (0.7) 1150 (0.5)    Emesis/NG output 0 (0) 0 (0)    Drains 50 (0)     Other 0 (0) 0 (0)    Stool 0 (0) 0 (0)    Total Output 1800 1150    Net +750.8 +2558.8          Urine Occurrence  1 x    Stool Occurrence 0 x 5 x    Emesis Occurrence 0 x 0 x          Physical Exam   Constitutional:  She is oriented to person, place, and time. She appears well-developed and well-nourished. No distress.   HENT:   Head: Normocephalic and atraumatic.   Cardiovascular: Normal rate and regular rhythm.    Pulmonary/Chest: Effort normal. No respiratory distress.   Abdominal: Tenderness: minimal, incisional.   Soft, TTP RLQ and midline, non-distended  Inferior portion of incision opened, with drainage of purulence, packed with gauze  Ostomy reversal packing changed, wound base clean    Musculoskeletal: She exhibits no edema.   Neurological: She is alert and oriented to person, place, and time.   Skin: Skin is warm. Capillary refill takes less than 2 seconds.   Psychiatric: She has a normal mood and affect.       Significant Labs:  CBC:     Recent Labs  Lab 04/12/18  0408   WBC 16.36*   RBC 3.43*   HGB 10.4*   HCT 30.5*      MCV 89   MCH 30.3   MCHC 34.1     BMP:     Recent Labs  Lab 04/12/18  0408   *   *   K 4.1   CL 99   CO2 25   BUN 10   CREATININE 0.9   CALCIUM 8.5*   MG 2.2     CMP:     Recent Labs  Lab 04/12/18  0408   *   CALCIUM 8.5*   ALBUMIN 3.2*   PROT 7.0   *   K 4.1   CO2 25   CL 99   BUN 10   CREATININE 0.9   ALKPHOS 83   ALT 16   AST 19   BILITOT 1.8*     LFTs:     Recent Labs  Lab 04/12/18  0408   ALT 16   AST 19   ALKPHOS 83   BILITOT 1.8*   PROT 7.0   ALBUMIN 3.2*     Assessment/Plan:     * Incisional hernia    51 yo female s/p incisional hernia repair, colostomy take down 4/10/18    -fulls  --cipro/flagyl for SSI, change packing daily  -PO pain  -nausea meds PRN  -IVF d5 @ 75  -DVT prophylaxis  -PT/OT/IS        Wound infection after surgery    Refer to primary plan         Depression    Home meds  -lexapro          Hypertension    Home meds  -atenolol, chlorthalidone            Fely Washburn MD  General Surgery  [facility]

## 2018-04-15 LAB
ALBUMIN SERPL BCP-MCNC: 2.8 G/DL
ALP SERPL-CCNC: 112 U/L
ALT SERPL W/O P-5'-P-CCNC: 20 U/L
ANION GAP SERPL CALC-SCNC: 13 MMOL/L
AST SERPL-CCNC: 24 U/L
BASOPHILS # BLD AUTO: 0.04 K/UL
BASOPHILS NFR BLD: 0.3 %
BILIRUB SERPL-MCNC: 1.2 MG/DL
BUN SERPL-MCNC: 9 MG/DL
CALCIUM SERPL-MCNC: 8.6 MG/DL
CHLORIDE SERPL-SCNC: 99 MMOL/L
CO2 SERPL-SCNC: 22 MMOL/L
CREAT SERPL-MCNC: 0.9 MG/DL
DIFFERENTIAL METHOD: ABNORMAL
EOSINOPHIL # BLD AUTO: 0.3 K/UL
EOSINOPHIL NFR BLD: 2.1 %
ERYTHROCYTE [DISTWIDTH] IN BLOOD BY AUTOMATED COUNT: 13.8 %
EST. GFR  (AFRICAN AMERICAN): >60 ML/MIN/1.73 M^2
EST. GFR  (NON AFRICAN AMERICAN): >60 ML/MIN/1.73 M^2
GLUCOSE SERPL-MCNC: 125 MG/DL
HCT VFR BLD AUTO: 27.6 %
HGB BLD-MCNC: 9.3 G/DL
IMM GRANULOCYTES # BLD AUTO: 0.13 K/UL
IMM GRANULOCYTES NFR BLD AUTO: 1 %
LYMPHOCYTES # BLD AUTO: 1.3 K/UL
LYMPHOCYTES NFR BLD: 10.3 %
MAGNESIUM SERPL-MCNC: 2 MG/DL
MCH RBC QN AUTO: 30.3 PG
MCHC RBC AUTO-ENTMCNC: 33.7 G/DL
MCV RBC AUTO: 90 FL
MONOCYTES # BLD AUTO: 1 K/UL
MONOCYTES NFR BLD: 7.9 %
NEUTROPHILS # BLD AUTO: 9.9 K/UL
NEUTROPHILS NFR BLD: 78.4 %
NRBC BLD-RTO: 0 /100 WBC
PHOSPHATE SERPL-MCNC: 4 MG/DL
PLATELET # BLD AUTO: 218 K/UL
PMV BLD AUTO: 9.8 FL
POTASSIUM SERPL-SCNC: 4.8 MMOL/L
PROT SERPL-MCNC: 7.1 G/DL
RBC # BLD AUTO: 3.07 M/UL
SODIUM SERPL-SCNC: 134 MMOL/L
WBC # BLD AUTO: 12.68 K/UL

## 2018-04-15 PROCEDURE — 25000003 PHARM REV CODE 250: Performed by: SURGERY

## 2018-04-15 PROCEDURE — 63600175 PHARM REV CODE 636 W HCPCS: Performed by: STUDENT IN AN ORGANIZED HEALTH CARE EDUCATION/TRAINING PROGRAM

## 2018-04-15 PROCEDURE — 87077 CULTURE AEROBIC IDENTIFY: CPT

## 2018-04-15 PROCEDURE — 84100 ASSAY OF PHOSPHORUS: CPT

## 2018-04-15 PROCEDURE — 63600175 PHARM REV CODE 636 W HCPCS: Performed by: SURGERY

## 2018-04-15 PROCEDURE — 87186 SC STD MICRODIL/AGAR DIL: CPT

## 2018-04-15 PROCEDURE — 36415 COLL VENOUS BLD VENIPUNCTURE: CPT

## 2018-04-15 PROCEDURE — 25000003 PHARM REV CODE 250: Performed by: STUDENT IN AN ORGANIZED HEALTH CARE EDUCATION/TRAINING PROGRAM

## 2018-04-15 PROCEDURE — 85025 COMPLETE CBC W/AUTO DIFF WBC: CPT

## 2018-04-15 PROCEDURE — 20600001 HC STEP DOWN PRIVATE ROOM

## 2018-04-15 PROCEDURE — 83735 ASSAY OF MAGNESIUM: CPT

## 2018-04-15 PROCEDURE — 87075 CULTR BACTERIA EXCEPT BLOOD: CPT

## 2018-04-15 PROCEDURE — 87070 CULTURE OTHR SPECIMN AEROBIC: CPT

## 2018-04-15 PROCEDURE — 80053 COMPREHEN METABOLIC PANEL: CPT

## 2018-04-15 PROCEDURE — S0030 INJECTION, METRONIDAZOLE: HCPCS | Performed by: STUDENT IN AN ORGANIZED HEALTH CARE EDUCATION/TRAINING PROGRAM

## 2018-04-15 RX ORDER — HYDROXYZINE PAMOATE 25 MG/1
25 CAPSULE ORAL EVERY 8 HOURS PRN
Status: DISCONTINUED | OUTPATIENT
Start: 2018-04-15 | End: 2018-04-17 | Stop reason: HOSPADM

## 2018-04-15 RX ORDER — TRAZODONE HYDROCHLORIDE 50 MG/1
50 TABLET ORAL NIGHTLY PRN
Status: DISCONTINUED | OUTPATIENT
Start: 2018-04-15 | End: 2018-04-17 | Stop reason: HOSPADM

## 2018-04-15 RX ADMIN — METRONIDAZOLE 500 MG: 500 INJECTION, SOLUTION INTRAVENOUS at 11:04

## 2018-04-15 RX ADMIN — Medication 4 MG: at 03:04

## 2018-04-15 RX ADMIN — CIPROFLOXACIN 400 MG: 2 INJECTION, SOLUTION INTRAVENOUS at 09:04

## 2018-04-15 RX ADMIN — GABAPENTIN 100 MG: 100 CAPSULE ORAL at 08:04

## 2018-04-15 RX ADMIN — TRAZODONE HYDROCHLORIDE 50 MG: 50 TABLET ORAL at 08:04

## 2018-04-15 RX ADMIN — METRONIDAZOLE 500 MG: 500 INJECTION, SOLUTION INTRAVENOUS at 09:04

## 2018-04-15 RX ADMIN — ESCITALOPRAM 20 MG: 20 TABLET, FILM COATED ORAL at 09:04

## 2018-04-15 RX ADMIN — METRONIDAZOLE 500 MG: 500 INJECTION, SOLUTION INTRAVENOUS at 05:04

## 2018-04-15 RX ADMIN — ATENOLOL 100 MG: 50 TABLET ORAL at 09:04

## 2018-04-15 RX ADMIN — GABAPENTIN 100 MG: 100 CAPSULE ORAL at 09:04

## 2018-04-15 RX ADMIN — OXYCODONE HYDROCHLORIDE AND ACETAMINOPHEN 1 TABLET: 10; 325 TABLET ORAL at 02:04

## 2018-04-15 RX ADMIN — OXYCODONE HYDROCHLORIDE AND ACETAMINOPHEN 1 TABLET: 10; 325 TABLET ORAL at 07:04

## 2018-04-15 RX ADMIN — CHLORTHALIDONE 25 MG: 25 TABLET ORAL at 09:04

## 2018-04-15 RX ADMIN — OXYCODONE HYDROCHLORIDE AND ACETAMINOPHEN 1 TABLET: 10; 325 TABLET ORAL at 09:04

## 2018-04-15 RX ADMIN — ENOXAPARIN SODIUM 40 MG: 100 INJECTION SUBCUTANEOUS at 05:04

## 2018-04-15 NOTE — PLAN OF CARE
Problem: Patient Care Overview  Goal: Plan of Care Review  Outcome: Ongoing (interventions implemented as appropriate)  POC reviewed with pt, stated understanding, AAOx4, VSS. amb to BR independently, voids per Br, IVF infusing per order. Pain managed with meds effectively. LEON dsg changed X1 this shift, Midline reinforced X1.  CPAP in place at Hs, no adverse events this shift. Bed low, call light in reach. Will cont to manage POC.       Patient feeling better, labs unremarkable, exam remains normal, discussed results of labs in ED, patient amenable to discharge. Stephon Bryant M.D.

## 2018-04-15 NOTE — ASSESSMENT & PLAN NOTE
53 yo female s/p incisional hernia repair, colostomy take down 4/10/18    - regular food today  --cipro/flagyl for SSI, change packing daily  -PO pain  -nausea meds PRN  -HLIV  -DVT prophylaxis  -PT/OT/IS

## 2018-04-15 NOTE — PROGRESS NOTES
[facility]  General Surgery  Progress Note    Subjective:     History of Present Illness:  No notes on file    Post-Op Info:  Procedure(s) (LRB):  REPAIR-HERNIA-INCISIONAL-INITIAL Open (N/A)  COLOSTOMY-REVERSAL (N/A)   5 Days Post-Op     Interval History: reported more drainage form wound overnight, second area of incision opened and packed, culture sent, still having bowel function, tolerated liquids well, no nausea, ambulating well, feeling more anxious this morning    Medications:  Continuous Infusions:   dextrose 5 % and 0.45 % NaCl with KCl 20 mEq 100 mL/hr at 04/14/18 1315     Scheduled Meds:   atenolol  100 mg Oral Daily    chlorthalidone  25 mg Oral Daily    ciprofloxacin  400 mg Intravenous Q12H    enoxaparin  40 mg Subcutaneous Daily    escitalopram oxalate  20 mg Oral Daily    gabapentin  100 mg Oral BID    losartan  100 mg Oral QHS    metronidazole  500 mg Intravenous Q8H     PRN Meds:diphenhydrAMINE, hydrOXYzine pamoate, morphine, ondansetron, oxyCODONE-acetaminophen, oxyCODONE-acetaminophen, promethazine (PHENERGAN) IVPB, ramelteon, sodium chloride 0.9%     Review of patient's allergies indicates:  No Known Allergies  Objective:     Vital Signs (Most Recent):  Temp: 98 °F (36.7 °C) (04/15/18 0724)  Pulse: 70 (04/15/18 0754)  Resp: 18 (04/15/18 0724)  BP: (!) 142/68 (04/15/18 0724)  SpO2: 98 % (04/15/18 0754) Vital Signs (24h Range):  Temp:  [97.1 °F (36.2 °C)-100.1 °F (37.8 °C)] 98 °F (36.7 °C)  Pulse:  [67-83] 70  Resp:  [16-19] 18  SpO2:  [96 %-99 %] 98 %  BP: ()/(51-68) 142/68     Weight: 99.7 kg (219 lb 12.8 oz)  Body mass index is 36.58 kg/m².    Intake/Output - Last 3 Shifts       04/13 0700 - 04/14 0659 04/14 0700 - 04/15 0659 04/15 0700 - 04/16 0659    P.O. 1700 600     I.V. (mL/kg) 2400 (24.1) 1200 (12)     IV Piggyback 250 400     Total Intake(mL/kg) 4350 (43.6) 2200 (22.1)     Urine (mL/kg/hr) 3150 (1.3) 1500 (0.6)     Emesis/NG output       Other        Stool 0 (0) 1 (0)     Total Output 3150 1501      Net +1200 +699             Urine Occurrence 2 x 2 x     Stool Occurrence 4 x 3 x           Physical Exam   Constitutional: She is oriented to person, place, and time. She appears well-developed and well-nourished. No distress.   HENT:   Head: Normocephalic and atraumatic.   Eyes: Conjunctivae are normal. Right eye exhibits no discharge. Left eye exhibits no discharge. No scleral icterus.   Neck: Normal range of motion. Neck supple. No JVD present. No tracheal deviation present.   Cardiovascular: Normal rate and regular rhythm.    Pulmonary/Chest: Effort normal. No respiratory distress.   Abdominal: Soft. She exhibits no distension. There is tenderness (approptiate).       Neurological: She is alert and oriented to person, place, and time.   Skin: Skin is warm and dry. No rash noted. She is not diaphoretic. No erythema.       Significant Labs:  CBC:   Recent Labs  Lab 04/14/18  0553   WBC 14.35*   RBC 2.84*   HGB 8.4*   HCT 26.2*      MCV 92   MCH 29.6   MCHC 32.1     CMP:   Recent Labs  Lab 04/14/18  0553   *   CALCIUM 8.4*   ALBUMIN 2.7*   PROT 6.4   *   K 3.6   CO2 26   CL 98   BUN 4*   CREATININE 1.1   ALKPHOS 98   ALT 17   AST 22   BILITOT 1.4*       Significant Diagnostics:  I have reviewed all pertinent imaging results/findings within the past 24 hours.    Assessment/Plan:     * Incisional hernia    53 yo female s/p incisional hernia repair, colostomy take down 4/10/18    - regular food today  --cipro/flagyl for SSI, change packing daily  -PO pain  -nausea meds PRN  -HLIV  -DVT prophylaxis  -PT/OT/IS        Wound infection after surgery    Refer to primary plan         Depression    Home meds  -lexapro, trazadone, restoril          Hypertension    Home meds  -atenolol, chlorthalidone            Kermit Alberto MD  General Surgery  [facility]

## 2018-04-15 NOTE — SUBJECTIVE & OBJECTIVE
Interval History: reported more drainage form wound overnight, second area of incision opened and packed, culture sent, still having bowel function, tolerated liquids well, no nausea, ambulating well, feeling more anxious this morning    Medications:  Continuous Infusions:   dextrose 5 % and 0.45 % NaCl with KCl 20 mEq 100 mL/hr at 04/14/18 1315     Scheduled Meds:   atenolol  100 mg Oral Daily    chlorthalidone  25 mg Oral Daily    ciprofloxacin  400 mg Intravenous Q12H    enoxaparin  40 mg Subcutaneous Daily    escitalopram oxalate  20 mg Oral Daily    gabapentin  100 mg Oral BID    losartan  100 mg Oral QHS    metronidazole  500 mg Intravenous Q8H     PRN Meds:diphenhydrAMINE, hydrOXYzine pamoate, morphine, ondansetron, oxyCODONE-acetaminophen, oxyCODONE-acetaminophen, promethazine (PHENERGAN) IVPB, ramelteon, sodium chloride 0.9%     Review of patient's allergies indicates:  No Known Allergies  Objective:     Vital Signs (Most Recent):  Temp: 98 °F (36.7 °C) (04/15/18 0724)  Pulse: 70 (04/15/18 0754)  Resp: 18 (04/15/18 0724)  BP: (!) 142/68 (04/15/18 0724)  SpO2: 98 % (04/15/18 0754) Vital Signs (24h Range):  Temp:  [97.1 °F (36.2 °C)-100.1 °F (37.8 °C)] 98 °F (36.7 °C)  Pulse:  [67-83] 70  Resp:  [16-19] 18  SpO2:  [96 %-99 %] 98 %  BP: ()/(51-68) 142/68     Weight: 99.7 kg (219 lb 12.8 oz)  Body mass index is 36.58 kg/m².    Intake/Output - Last 3 Shifts       04/13 0700 - 04/14 0659 04/14 0700 - 04/15 0659 04/15 0700 - 04/16 0659    P.O. 1700 600     I.V. (mL/kg) 2400 (24.1) 1200 (12)     IV Piggyback 250 400     Total Intake(mL/kg) 4350 (43.6) 2200 (22.1)     Urine (mL/kg/hr) 3150 (1.3) 1500 (0.6)     Emesis/NG output       Other       Stool 0 (0) 1 (0)     Total Output 3150 1501      Net +1200 +699             Urine Occurrence 2 x 2 x     Stool Occurrence 4 x 3 x           Physical Exam   Constitutional: She is oriented to person, place, and time. She appears well-developed and  well-nourished. No distress.   HENT:   Head: Normocephalic and atraumatic.   Eyes: Conjunctivae are normal. Right eye exhibits no discharge. Left eye exhibits no discharge. No scleral icterus.   Neck: Normal range of motion. Neck supple. No JVD present. No tracheal deviation present.   Cardiovascular: Normal rate and regular rhythm.    Pulmonary/Chest: Effort normal. No respiratory distress.   Abdominal: Soft. She exhibits no distension. There is tenderness (approptiate).       Neurological: She is alert and oriented to person, place, and time.   Skin: Skin is warm and dry. No rash noted. She is not diaphoretic. No erythema.       Significant Labs:  CBC:   Recent Labs  Lab 04/14/18  0553   WBC 14.35*   RBC 2.84*   HGB 8.4*   HCT 26.2*      MCV 92   MCH 29.6   MCHC 32.1     CMP:   Recent Labs  Lab 04/14/18  0553   *   CALCIUM 8.4*   ALBUMIN 2.7*   PROT 6.4   *   K 3.6   CO2 26   CL 98   BUN 4*   CREATININE 1.1   ALKPHOS 98   ALT 17   AST 22   BILITOT 1.4*       Significant Diagnostics:  I have reviewed all pertinent imaging results/findings within the past 24 hours.

## 2018-04-15 NOTE — PLAN OF CARE
Problem: Patient Care Overview  Goal: Plan of Care Review  Outcome: Ongoing (interventions implemented as appropriate)  POC reviewed with pt verbalizing understanding and no further questions at this time. AAO x4 with VSS on RA.  ML abdominal incision with sutures intact SANFORD with small inferior incision dehisce with serosanguineous drainage noted to gauze dressing. Left lower abdominal incision has open edges with wet-to-dry gauze changed by MD during rounds at bedside. Pt denies SOB, CP, and headaches. Incisional pain being managed with oral PRN meds, see MAR. Pt ambulates independently in halls and to bathroom while remaining free of falls and injury. Advanced and tolerating low fiber diet with no c/o nausea. Family x5 visited at bedside most of shift. Pt noticed to be more alert and aware of medical lines and drains. Bed in lowest position with call light within reach. WCTM

## 2018-04-16 PROBLEM — T81.49XA WOUND INFECTION AFTER SURGERY: Status: ACTIVE | Noted: 2018-04-16

## 2018-04-16 LAB
ALBUMIN SERPL BCP-MCNC: 2.4 G/DL
ALP SERPL-CCNC: 101 U/L
ALT SERPL W/O P-5'-P-CCNC: 14 U/L
ANION GAP SERPL CALC-SCNC: 9 MMOL/L
AST SERPL-CCNC: 16 U/L
BASOPHILS # BLD AUTO: 0.03 K/UL
BASOPHILS NFR BLD: 0.3 %
BILIRUB SERPL-MCNC: 0.6 MG/DL
BUN SERPL-MCNC: 8 MG/DL
CALCIUM SERPL-MCNC: 8.2 MG/DL
CHLORIDE SERPL-SCNC: 94 MMOL/L
CO2 SERPL-SCNC: 29 MMOL/L
CREAT SERPL-MCNC: 0.9 MG/DL
DIFFERENTIAL METHOD: ABNORMAL
EOSINOPHIL # BLD AUTO: 0.4 K/UL
EOSINOPHIL NFR BLD: 3.3 %
ERYTHROCYTE [DISTWIDTH] IN BLOOD BY AUTOMATED COUNT: 14 %
EST. GFR  (AFRICAN AMERICAN): >60 ML/MIN/1.73 M^2
EST. GFR  (NON AFRICAN AMERICAN): >60 ML/MIN/1.73 M^2
GLUCOSE SERPL-MCNC: 158 MG/DL
HCT VFR BLD AUTO: 26.7 %
HGB BLD-MCNC: 8.7 G/DL
IMM GRANULOCYTES # BLD AUTO: 0.15 K/UL
IMM GRANULOCYTES NFR BLD AUTO: 1.4 %
LYMPHOCYTES # BLD AUTO: 1.4 K/UL
LYMPHOCYTES NFR BLD: 12.7 %
MAGNESIUM SERPL-MCNC: 1.8 MG/DL
MCH RBC QN AUTO: 29.5 PG
MCHC RBC AUTO-ENTMCNC: 32.6 G/DL
MCV RBC AUTO: 91 FL
MONOCYTES # BLD AUTO: 1 K/UL
MONOCYTES NFR BLD: 9.4 %
NEUTROPHILS # BLD AUTO: 7.9 K/UL
NEUTROPHILS NFR BLD: 72.9 %
NRBC BLD-RTO: 0 /100 WBC
PHOSPHATE SERPL-MCNC: 3.8 MG/DL
PLATELET # BLD AUTO: 255 K/UL
PMV BLD AUTO: 9.7 FL
POTASSIUM SERPL-SCNC: 3.9 MMOL/L
PROT SERPL-MCNC: 6.1 G/DL
RBC # BLD AUTO: 2.95 M/UL
SODIUM SERPL-SCNC: 132 MMOL/L
WBC # BLD AUTO: 10.77 K/UL

## 2018-04-16 PROCEDURE — 63600175 PHARM REV CODE 636 W HCPCS: Performed by: STUDENT IN AN ORGANIZED HEALTH CARE EDUCATION/TRAINING PROGRAM

## 2018-04-16 PROCEDURE — 25000003 PHARM REV CODE 250: Performed by: PHYSICIAN ASSISTANT

## 2018-04-16 PROCEDURE — 25000003 PHARM REV CODE 250: Performed by: STUDENT IN AN ORGANIZED HEALTH CARE EDUCATION/TRAINING PROGRAM

## 2018-04-16 PROCEDURE — 84100 ASSAY OF PHOSPHORUS: CPT

## 2018-04-16 PROCEDURE — 36415 COLL VENOUS BLD VENIPUNCTURE: CPT

## 2018-04-16 PROCEDURE — 80053 COMPREHEN METABOLIC PANEL: CPT

## 2018-04-16 PROCEDURE — 25000003 PHARM REV CODE 250: Performed by: SURGERY

## 2018-04-16 PROCEDURE — 85025 COMPLETE CBC W/AUTO DIFF WBC: CPT

## 2018-04-16 PROCEDURE — 83735 ASSAY OF MAGNESIUM: CPT

## 2018-04-16 PROCEDURE — 63600175 PHARM REV CODE 636 W HCPCS: Performed by: SURGERY

## 2018-04-16 PROCEDURE — 20600001 HC STEP DOWN PRIVATE ROOM

## 2018-04-16 RX ORDER — METRONIDAZOLE 500 MG/1
500 TABLET ORAL EVERY 8 HOURS
Status: DISCONTINUED | OUTPATIENT
Start: 2018-04-16 | End: 2018-04-17 | Stop reason: HOSPADM

## 2018-04-16 RX ORDER — CIPROFLOXACIN 500 MG/1
500 TABLET ORAL EVERY 12 HOURS
Status: DISCONTINUED | OUTPATIENT
Start: 2018-04-16 | End: 2018-04-17 | Stop reason: HOSPADM

## 2018-04-16 RX ADMIN — METRONIDAZOLE 500 MG: 500 TABLET ORAL at 09:04

## 2018-04-16 RX ADMIN — ATENOLOL 100 MG: 50 TABLET ORAL at 09:04

## 2018-04-16 RX ADMIN — CIPROFLOXACIN HYDROCHLORIDE 500 MG: 500 TABLET, FILM COATED ORAL at 09:04

## 2018-04-16 RX ADMIN — GABAPENTIN 100 MG: 100 CAPSULE ORAL at 09:04

## 2018-04-16 RX ADMIN — OXYCODONE HYDROCHLORIDE AND ACETAMINOPHEN 1 TABLET: 10; 325 TABLET ORAL at 10:04

## 2018-04-16 RX ADMIN — OXYCODONE HYDROCHLORIDE AND ACETAMINOPHEN 1 TABLET: 10; 325 TABLET ORAL at 12:04

## 2018-04-16 RX ADMIN — OXYCODONE HYDROCHLORIDE AND ACETAMINOPHEN 1 TABLET: 10; 325 TABLET ORAL at 11:04

## 2018-04-16 RX ADMIN — CHLORTHALIDONE 25 MG: 25 TABLET ORAL at 09:04

## 2018-04-16 RX ADMIN — ESCITALOPRAM 20 MG: 20 TABLET, FILM COATED ORAL at 09:04

## 2018-04-16 RX ADMIN — Medication 4 MG: at 02:04

## 2018-04-16 RX ADMIN — Medication 4 MG: at 09:04

## 2018-04-16 RX ADMIN — METRONIDAZOLE 500 MG: 500 TABLET ORAL at 02:04

## 2018-04-16 RX ADMIN — ENOXAPARIN SODIUM 40 MG: 100 INJECTION SUBCUTANEOUS at 06:04

## 2018-04-16 RX ADMIN — LOSARTAN POTASSIUM 100 MG: 50 TABLET, FILM COATED ORAL at 09:04

## 2018-04-16 RX ADMIN — OXYCODONE HYDROCHLORIDE AND ACETAMINOPHEN 1 TABLET: 10; 325 TABLET ORAL at 06:04

## 2018-04-16 NOTE — PLAN OF CARE
Problem: Patient Care Overview  Goal: Plan of Care Review  Outcome: Ongoing (interventions implemented as appropriate)  POC reviewed with patient stated understanding. AAOx4, VSS low BP at times with IV Pain meds, nonsymptomatic. DSG to abd reinforced X1 this shift. Pt amb to BR IND, pain managed with meds per order, managed well with po most of the shift. CPAP at Hs. No adverse events this shift, call light in reach, bed low, will cont to manage POC.

## 2018-04-16 NOTE — PHYSICIAN QUERY
PT Name: Barbara Ortiz  MR #: [id number]     Physician Query Form - Documentation Clarification      CDS/: Pam Billings               Contact information: [email or web address]    This form is a permanent document in the medical record.     Query Date: April 16, 2018    By submitting this query, we are merely seeking further clarification of documentation. Please utilize your independent clinical judgment when addressing the question(s) below.    The Medical record reflects the following:    Supporting Clinical Findings Location in Medical Record   Leaking from inferior portion of incision.    Inferior portion of incision opened, with drainage of purulence, packed with gauze    Wound infection after surgery General Surgery PN 04/14             More drainage form wound overnight, second area of incision opened and packed, culture sent   General Surgery PN 04/15     Two open areas of midline incision packed with gauze  Previous ostomy site packed  All three sites with good granulation tissue at base, no signs or symptoms of infection appreciated    Wound infection after surgery  F/u cultures  PO cipro/flagyl - started 4/14/18 General Surgery PN 04/16   ML abdominal incision with sutures intact SANFORD with small inferior incision dehisce   Left lower abdominal incision has open edges    Nurse's Plan of Care 04/15                                                                            Doctor, Please specify diagnosis or diagnoses associated with above clinical findings.    Provider Use Only      [ x Surgical Site Infection With Incisional Dehiscence     [  ] Surgical Site Infection Without Incisional Dehiscence     [  ] Other diagnosis, please specify:  __________________                                                                                                             [  ] Clinically undetermined

## 2018-04-16 NOTE — PLAN OF CARE
Problem: Patient Care Overview  Goal: Plan of Care Review  Outcome: Ongoing (interventions implemented as appropriate)  Care plan reviewed and understood by patient. Resp rate even and unlabored. VSS. Patient tolerating diet with no reports of nausea. Dressing change done to abdomen twice. Patient educated on how to do wet to dry dressing changes. Pat ambulated in hallway. Patient remain free of falls. No acute pain or distress noted. Will continue to monitor.

## 2018-04-16 NOTE — PLAN OF CARE
[facility]    HOME HEALTH ORDERS  FACE TO FACE ENCOUNTER    Patient Name: Barbara Ortiz  YOB: 1965    PCP: Noah Cordero MD   PCP Address: [address] / [place] [address]  PCP Phone Number: [phone]  PCP Fax: [phone]    Encounter Date: 04/16/2018    Admit to Home Health    Diagnoses:  Active Hospital Problems    Diagnosis  POA    *Incisional hernia [K43.2]  Yes    Wound infection after surgery [T81.4XXA]  Unknown    Hypertension [I10]  Yes    Depression [F32.9]  Yes      Resolved Hospital Problems    Diagnosis Date Resolved POA   No resolved problems to display.       No future appointments.  Follow-up Information     Teo Baker MD.    Specialty:  General Surgery  Contact information:  [address]  [facility] [address]  [phone]                     I have seen and examined this patient face to face today. My clinical findings that support the need for the home health skilled services and home bound status are the following:  Medical restrictions requiring assistance of another human to leave home due to  Post surgery monitoring and Wound care needs.    Allergies:Review of patient's allergies indicates:  No Known Allergies    Diet: regular diet    Activities: no heavy lifting, nothing heavier than 10lbs    Nursing:   SN to complete comprehensive assessment including routine vital signs. Instruct on disease process and s/s of complications to report to MD. Review/verify medication list sent home with the patient at time of discharge  and instruct patient/caregiver as needed. Frequency may be adjusted depending on start of care date.    Notify MD if SBP > 160 or < 90; DBP > 90 or < 50; HR > 120 or < 50; Temp > 101      CONSULTS:    Physical Therapy to evaluate and treat. Evaluate for home safety and equipment needs; Establish/upgrade home exercise program. Perform / instruct on therapeutic exercises, gait training, transfer training, and  Range of Motion.  Occupational Therapy to evaluate and treat. Evaluate home environment for safety and equipment needs. Perform/Instruct on transfers, ADL training, ROM, and therapeutic exercises.  Aide to provide assistance with personal care, ADLs, and vital signs.    MISCELLANEOUS CARE:  N/A    WOUND CARE ORDERS  yes:  Surgical Wound:  Location: midline abdominal incision x2 and LLQ x1    Consult ET nurse        Apply the following to wound:   Pack each wound (3 of them), with moist gauze, cover with dry gauze and tegaderm; change daily   Ok to shower after packing removed      Medications: Review discharge medications with patient and family and provide education.      Current Discharge Medication List      CONTINUE these medications which have NOT CHANGED    Details   atenolol-chlorthalidone (TENORETIC) 100-25 mg per tablet Take 1 tablet by mouth.      atenolol-chlorthalidone (TENORETIC) 50-25 mg Tab Take 1 tablet by mouth once daily.       escitalopram oxalate (LEXAPRO) 20 MG tablet Take 20 mg by mouth.      gabapentin (NEURONTIN) 100 MG capsule Take 100 mg by mouth 2 (two) times daily.       traZODone (DESYREL) 50 MG tablet Take 50 mg by mouth nightly as needed.       hydrocodone-acetaminophen 10-325mg (NORCO)  mg Tab Take by mouth every 4 (four) hours as needed.       hydrocodone-acetaminophen 5-325mg (NORCO) 5-325 mg per tablet Take 1 tablet by mouth.      hydrOXYzine pamoate (VISTARIL) 25 MG Cap Take 25 mg by mouth every 8 (eight) hours as needed.       losartan (COZAAR) 100 MG tablet Take 100 mg by mouth every evening.              I certify that this patient is confined to her home and needs intermittent skilled nursing care, physical therapy and occupational therapy.

## 2018-04-16 NOTE — PLAN OF CARE
09:20 AM Per this mornings jessica garcia PA, HH orders placed for patient has 3 wounds w/packing & dressing changes daily, w/discharge date expected tomorrow.     10:20 AM Updated covering MONTSERRAT, Pamela Louis ([phone]), regarding above.  states she will try to arrange HH, but states it is unlikely she will get a HH Agency to accept due to her insurer, [employer].   Updated floor nurse, Mackenzie ([phone]). She will instruct patient on wound care for patient to do herself should no HH Agency accept.   NETTIE Thomas, updated.

## 2018-04-16 NOTE — ASSESSMENT & PLAN NOTE
53 yo female s/p incisional hernia repair, colostomy take down 4/10/18    -regular diet  -PO pain  -nausea meds PRN  -HLIV  -DVT prophylaxis  -PT/OT/IS  -plan for d/c today vs tomorrow

## 2018-04-16 NOTE — PROGRESS NOTES
[facility]  General Surgery  Progress Note    Subjective:     Post-Op Info:  Procedure(s) (LRB):  REPAIR-HERNIA-INCISIONAL-INITIAL Open (N/A)  COLOSTOMY-REVERSAL (N/A)   6 Days Post-Op     Interval History:   Patient seen and examined, no acute events overnight  Tolerating regular diet with minimal cramping, no nausea/vomiting  Reports small thin BMx2  Pain improved after opening incision  Afebrile/VSS    Medications:  Continuous Infusions:  Scheduled Meds:   atenolol  100 mg Oral Daily    chlorthalidone  25 mg Oral Daily    ciprofloxacin  400 mg Intravenous Q12H    enoxaparin  40 mg Subcutaneous Daily    escitalopram oxalate  20 mg Oral Daily    gabapentin  100 mg Oral BID    losartan  100 mg Oral QHS    metronidazole  500 mg Intravenous Q8H     PRN Meds:diphenhydrAMINE, hydrOXYzine pamoate, morphine, ondansetron, oxyCODONE-acetaminophen, oxyCODONE-acetaminophen, promethazine (PHENERGAN) IVPB, ramelteon, sodium chloride 0.9%, traZODone     Review of patient's allergies indicates:  No Known Allergies  Objective:     Vital Signs (Most Recent):  Temp: 98.4 °F (36.9 °C) (04/16/18 0438)  Pulse: 71 (04/16/18 0438)  Resp: 18 (04/16/18 0438)  BP: (!) 97/50 (04/16/18 0438)  SpO2: 98 % (04/16/18 0438) Vital Signs (24h Range):  Temp:  [97.8 °F (36.6 °C)-98.6 °F (37 °C)] 98.4 °F (36.9 °C)  Pulse:  [64-89] 71  Resp:  [17-18] 18  SpO2:  [96 %-100 %] 98 %  BP: ()/(50-95) 97/50     Weight: 99.7 kg (219 lb 12.8 oz)  Body mass index is 36.58 kg/m².    Intake/Output - Last 3 Shifts       04/14 0700 - 04/15 0659 04/15 0700 - 04/16 0659 04/16 0700 - 04/17 0659    P.O. 600 1090     I.V. (mL/kg) 1200 (12)      IV Piggyback 400 700     Total Intake(mL/kg) 2200 (22.1) 1790 (18)     Urine (mL/kg/hr) 1500 (0.6) 550 (0.2)     Stool 1 (0) 0 (0)     Total Output 1501 550      Net +699 +1240             Urine Occurrence 2 x 4 x     Stool Occurrence 3 x 2 x           Physical Exam   Constitutional: She is  oriented to person, place, and time. She appears well-developed and well-nourished. No distress.   HENT:   Head: Normocephalic and atraumatic.   Cardiovascular: Normal rate and regular rhythm.    Pulmonary/Chest: Effort normal. No respiratory distress.   Abdominal: Tenderness: minimal, incisional.   Soft, appropriate TTP, non-distended  Two open areas of midline incision packed with gauze  Previous ostomy site packed  -all three sites with good granulation tissue at base, no signs or symptoms of infection appreciated; packing changed at bedside   Neurological: She is alert and oriented to person, place, and time.       Significant Labs:  CBC:   Recent Labs  Lab 04/16/18  0523   WBC 10.77   RBC 2.95*   HGB 8.7*   HCT 26.7*      MCV 91   MCH 29.5   MCHC 32.6     BMP:   Recent Labs  Lab 04/16/18  0523   *   *   K 3.9   CL 94*   CO2 29   BUN 8   CREATININE 0.9   CALCIUM 8.2*   MG 1.8     CMP:   Recent Labs  Lab 04/16/18  0523   *   CALCIUM 8.2*   ALBUMIN 2.4*   PROT 6.1   *   K 3.9   CO2 29   CL 94*   BUN 8   CREATININE 0.9   ALKPHOS 101   ALT 14   AST 16   BILITOT 0.6     LFTs:   Recent Labs  Lab 04/16/18  0523   ALT 14   AST 16   ALKPHOS 101   BILITOT 0.6   PROT 6.1   ALBUMIN 2.4*     Assessment/Plan:     * Incisional hernia    51 yo female s/p incisional hernia repair, colostomy take down 4/10/18    -regular diet  -PO pain  -nausea meds PRN  -HLIV  -DVT prophylaxis  -PT/OT/IS  -plan for d/c today vs tomorrow        Wound infection after surgery    -f/u cultures  -PO cipro/flagyl - started 4/14/18        Depression    Home meds  -lexapro, trazadone, restoril          Hypertension    Home meds  -atenolol, chlorthalidone            Martha Cho PA-C   [id number]  General Surgery  [facility]

## 2018-04-16 NOTE — SUBJECTIVE & OBJECTIVE
Interval History:   Patient seen and examined, no acute events overnight  Tolerating regular diet with minimal cramping, no nausea/vomiting  Reports small thin BMx2  Pain improved after opening incision  Afebrile/VSS    Medications:  Continuous Infusions:  Scheduled Meds:   atenolol  100 mg Oral Daily    chlorthalidone  25 mg Oral Daily    ciprofloxacin  400 mg Intravenous Q12H    enoxaparin  40 mg Subcutaneous Daily    escitalopram oxalate  20 mg Oral Daily    gabapentin  100 mg Oral BID    losartan  100 mg Oral QHS    metronidazole  500 mg Intravenous Q8H     PRN Meds:diphenhydrAMINE, hydrOXYzine pamoate, morphine, ondansetron, oxyCODONE-acetaminophen, oxyCODONE-acetaminophen, promethazine (PHENERGAN) IVPB, ramelteon, sodium chloride 0.9%, traZODone     Review of patient's allergies indicates:  No Known Allergies  Objective:     Vital Signs (Most Recent):  Temp: 98.4 °F (36.9 °C) (04/16/18 0438)  Pulse: 71 (04/16/18 0438)  Resp: 18 (04/16/18 0438)  BP: (!) 97/50 (04/16/18 0438)  SpO2: 98 % (04/16/18 0438) Vital Signs (24h Range):  Temp:  [97.8 °F (36.6 °C)-98.6 °F (37 °C)] 98.4 °F (36.9 °C)  Pulse:  [64-89] 71  Resp:  [17-18] 18  SpO2:  [96 %-100 %] 98 %  BP: ()/(50-95) 97/50     Weight: 99.7 kg (219 lb 12.8 oz)  Body mass index is 36.58 kg/m².    Intake/Output - Last 3 Shifts       04/14 0700 - 04/15 0659 04/15 0700 - 04/16 0659 04/16 0700 - 04/17 0659    P.O. 600 1090     I.V. (mL/kg) 1200 (12)      IV Piggyback 400 700     Total Intake(mL/kg) 2200 (22.1) 1790 (18)     Urine (mL/kg/hr) 1500 (0.6) 550 (0.2)     Stool 1 (0) 0 (0)     Total Output 1501 550      Net +699 +1240             Urine Occurrence 2 x 4 x     Stool Occurrence 3 x 2 x           Physical Exam   Constitutional: She is oriented to person, place, and time. She appears well-developed and well-nourished. No distress.   HENT:   Head: Normocephalic and atraumatic.   Cardiovascular: Normal rate and regular rhythm.    Pulmonary/Chest:  Effort normal. No respiratory distress.   Abdominal: Tenderness: minimal, incisional.   Soft, appropriate TTP, non-distended  Two open areas of midline incision packed with gauze  Previous ostomy site packed  -all three sites with good granulation tissue at base, no signs or symptoms of infection appreciated; packing changed at bedside   Neurological: She is alert and oriented to person, place, and time.       Significant Labs:  CBC:   Recent Labs  Lab 04/16/18 0523   WBC 10.77   RBC 2.95*   HGB 8.7*   HCT 26.7*      MCV 91   MCH 29.5   MCHC 32.6     BMP:   Recent Labs  Lab 04/16/18 0523   *   *   K 3.9   CL 94*   CO2 29   BUN 8   CREATININE 0.9   CALCIUM 8.2*   MG 1.8     CMP:   Recent Labs  Lab 04/16/18 0523   *   CALCIUM 8.2*   ALBUMIN 2.4*   PROT 6.1   *   K 3.9   CO2 29   CL 94*   BUN 8   CREATININE 0.9   ALKPHOS 101   ALT 14   AST 16   BILITOT 0.6     LFTs:   Recent Labs  Lab 04/16/18  0523   ALT 14   AST 16   ALKPHOS 101   BILITOT 0.6   PROT 6.1   ALBUMIN 2.4*

## 2018-04-17 VITALS
SYSTOLIC BLOOD PRESSURE: 111 MMHG | WEIGHT: 219.81 LBS | HEIGHT: 65 IN | OXYGEN SATURATION: 98 % | DIASTOLIC BLOOD PRESSURE: 55 MMHG | RESPIRATION RATE: 18 BRPM | TEMPERATURE: 98 F | HEART RATE: 71 BPM | BODY MASS INDEX: 36.62 KG/M2

## 2018-04-17 LAB
ALBUMIN SERPL BCP-MCNC: 2.8 G/DL
ALP SERPL-CCNC: 122 U/L
ALT SERPL W/O P-5'-P-CCNC: 15 U/L
ANION GAP SERPL CALC-SCNC: 15 MMOL/L
AST SERPL-CCNC: 21 U/L
BACTERIA SPEC AEROBE CULT: NORMAL
BASOPHILS # BLD AUTO: 0.04 K/UL
BASOPHILS NFR BLD: 0.4 %
BILIRUB SERPL-MCNC: 0.6 MG/DL
BUN SERPL-MCNC: 14 MG/DL
CALCIUM SERPL-MCNC: 9.1 MG/DL
CHLORIDE SERPL-SCNC: 95 MMOL/L
CO2 SERPL-SCNC: 22 MMOL/L
CREAT SERPL-MCNC: 0.9 MG/DL
DIFFERENTIAL METHOD: ABNORMAL
EOSINOPHIL # BLD AUTO: 0.3 K/UL
EOSINOPHIL NFR BLD: 2.8 %
ERYTHROCYTE [DISTWIDTH] IN BLOOD BY AUTOMATED COUNT: 14.1 %
EST. GFR  (AFRICAN AMERICAN): >60 ML/MIN/1.73 M^2
EST. GFR  (NON AFRICAN AMERICAN): >60 ML/MIN/1.73 M^2
GLUCOSE SERPL-MCNC: 113 MG/DL
HCT VFR BLD AUTO: 29.2 %
HGB BLD-MCNC: 9.5 G/DL
IMM GRANULOCYTES # BLD AUTO: 0.13 K/UL
IMM GRANULOCYTES NFR BLD AUTO: 1.3 %
LYMPHOCYTES # BLD AUTO: 1.5 K/UL
LYMPHOCYTES NFR BLD: 14.9 %
MAGNESIUM SERPL-MCNC: 1.9 MG/DL
MCH RBC QN AUTO: 29.6 PG
MCHC RBC AUTO-ENTMCNC: 32.5 G/DL
MCV RBC AUTO: 91 FL
MONOCYTES # BLD AUTO: 0.8 K/UL
MONOCYTES NFR BLD: 8.1 %
NEUTROPHILS # BLD AUTO: 7.4 K/UL
NEUTROPHILS NFR BLD: 72.5 %
NRBC BLD-RTO: 0 /100 WBC
PHOSPHATE SERPL-MCNC: 3.9 MG/DL
PLATELET # BLD AUTO: 268 K/UL
PMV BLD AUTO: 9.8 FL
POTASSIUM SERPL-SCNC: 4 MMOL/L
PROT SERPL-MCNC: 6.9 G/DL
RBC # BLD AUTO: 3.21 M/UL
SODIUM SERPL-SCNC: 132 MMOL/L
WBC # BLD AUTO: 10.14 K/UL

## 2018-04-17 PROCEDURE — 80053 COMPREHEN METABOLIC PANEL: CPT

## 2018-04-17 PROCEDURE — 85025 COMPLETE CBC W/AUTO DIFF WBC: CPT

## 2018-04-17 PROCEDURE — 63600175 PHARM REV CODE 636 W HCPCS: Performed by: STUDENT IN AN ORGANIZED HEALTH CARE EDUCATION/TRAINING PROGRAM

## 2018-04-17 PROCEDURE — 25000003 PHARM REV CODE 250: Performed by: STUDENT IN AN ORGANIZED HEALTH CARE EDUCATION/TRAINING PROGRAM

## 2018-04-17 PROCEDURE — 25000003 PHARM REV CODE 250: Performed by: PHYSICIAN ASSISTANT

## 2018-04-17 PROCEDURE — 25000003 PHARM REV CODE 250: Performed by: SURGERY

## 2018-04-17 PROCEDURE — 36415 COLL VENOUS BLD VENIPUNCTURE: CPT

## 2018-04-17 PROCEDURE — 84100 ASSAY OF PHOSPHORUS: CPT

## 2018-04-17 PROCEDURE — 83735 ASSAY OF MAGNESIUM: CPT

## 2018-04-17 RX ORDER — OXYCODONE AND ACETAMINOPHEN 5; 325 MG/1; MG/1
1 TABLET ORAL EVERY 4 HOURS PRN
Qty: 42 TABLET | Refills: 0 | Status: SHIPPED | OUTPATIENT
Start: 2018-04-17 | End: 2018-09-10 | Stop reason: CLARIF

## 2018-04-17 RX ORDER — IBUPROFEN 800 MG/1
800 TABLET ORAL 3 TIMES DAILY PRN
Qty: 21 TABLET | Refills: 0 | Status: SHIPPED | OUTPATIENT
Start: 2018-04-17 | End: 2018-09-10 | Stop reason: CLARIF

## 2018-04-17 RX ORDER — CIPROFLOXACIN 500 MG/1
500 TABLET ORAL EVERY 12 HOURS
Qty: 20 TABLET | Refills: 0 | Status: SHIPPED | OUTPATIENT
Start: 2018-04-17 | End: 2018-04-27

## 2018-04-17 RX ORDER — LOSARTAN POTASSIUM 100 MG/1
50 TABLET ORAL NIGHTLY
Qty: 15 TABLET | Refills: 0 | Status: SHIPPED | OUTPATIENT
Start: 2018-04-17 | End: 2020-12-16 | Stop reason: CLARIF

## 2018-04-17 RX ORDER — ATENOLOL 50 MG/1
50 TABLET ORAL DAILY
Status: DISCONTINUED | OUTPATIENT
Start: 2018-04-17 | End: 2018-04-17 | Stop reason: HOSPADM

## 2018-04-17 RX ORDER — LOSARTAN POTASSIUM 50 MG/1
50 TABLET ORAL NIGHTLY
Status: DISCONTINUED | OUTPATIENT
Start: 2018-04-17 | End: 2018-04-17 | Stop reason: HOSPADM

## 2018-04-17 RX ORDER — METRONIDAZOLE 500 MG/1
500 TABLET ORAL EVERY 8 HOURS
Qty: 30 TABLET | Refills: 0 | Status: SHIPPED | OUTPATIENT
Start: 2018-04-17 | End: 2018-04-27

## 2018-04-17 RX ADMIN — METRONIDAZOLE 500 MG: 500 TABLET ORAL at 02:04

## 2018-04-17 RX ADMIN — OXYCODONE HYDROCHLORIDE AND ACETAMINOPHEN 1 TABLET: 10; 325 TABLET ORAL at 02:04

## 2018-04-17 RX ADMIN — CIPROFLOXACIN HYDROCHLORIDE 500 MG: 500 TABLET, FILM COATED ORAL at 08:04

## 2018-04-17 RX ADMIN — OXYCODONE HYDROCHLORIDE AND ACETAMINOPHEN 1 TABLET: 10; 325 TABLET ORAL at 10:04

## 2018-04-17 RX ADMIN — Medication 4 MG: at 12:04

## 2018-04-17 RX ADMIN — SODIUM CHLORIDE, SODIUM LACTATE, POTASSIUM CHLORIDE, AND CALCIUM CHLORIDE 1000 ML: 600; 310; 30; 20 INJECTION, SOLUTION INTRAVENOUS at 08:04

## 2018-04-17 RX ADMIN — OXYCODONE HYDROCHLORIDE AND ACETAMINOPHEN 1 TABLET: 10; 325 TABLET ORAL at 04:04

## 2018-04-17 RX ADMIN — ESCITALOPRAM 20 MG: 20 TABLET, FILM COATED ORAL at 08:04

## 2018-04-17 RX ADMIN — METRONIDAZOLE 500 MG: 500 TABLET ORAL at 05:04

## 2018-04-17 RX ADMIN — GABAPENTIN 100 MG: 100 CAPSULE ORAL at 08:04

## 2018-04-17 NOTE — PLAN OF CARE
Problem: Patient Care Overview  Goal: Plan of Care Review  Outcome: Ongoing (interventions implemented as appropriate)  Plan of care reviewed, patient verbalizes understanding. AAOx4. VSS at this time. Pain managed with PRN meds throughout shift. Patient denies N/V, tolerating low fiber diet well. Patient is up ad gala and remains free of falls/injuries. No acute distress at this time. Will continue to monitor.

## 2018-04-17 NOTE — DISCHARGE SUMMARY
[facility]  General Surgery  Discharge Summary      Patient Name: Barbara Ortiz  MRN: [id number]  Admission Date: 4/10/2018  Hospital Length of Stay: 7 days  Discharge Date and Time:  04/17/2018 7:28 AM  Attending Physician: Teo Baker MD   Discharging Provider: Martha Cho PA-C  Primary Care Provider: Noah Cordero MD    HPI:   History of Present Illness:  Patient is a 52 y.o. female presents with incisional hernia associated with abdominal pain and the desire to have colostomy reversed. Onset of symptoms was abrupt starting several months ago with gradually worsening course since that time. Patient denies curent obstructive symptoms, although did have an ED admission at [facility] requiring NG tube decompression. Symptoms are aggravated by activity. Symptoms improve with rest.      Procedure(s) (LRB):  REPAIR-HERNIA-INCISIONAL-INITIAL Open (N/A)  COLOSTOMY-REVERSAL (N/A)      Indwelling Lines/Drains at time of discharge:   Lines/Drains/Airways          No matching active lines, drains, or airways        Hospital Course:   Patient presented to clinic with colostomy. She underwent: Procedure(s) (LRB):  REPAIR-HERNIA-INCISIONAL-INITIAL Open (N/A)  COLOSTOMY-REVERSAL (N/A). She tolerated the procedure well and was transferred to the floor after recovery from anesthesia. Please see the operative note for further procedure details. Vitals remained stable, and she was afebrile all throughout the post operative period. Labs were reviewed and electrolytes were replaced appropriately. Physical exam was appropriate for post operative state.  Diet was advanced, and she was able to tolerate a regular diet prior to discharge. She was ambulating without difficulty and had normal bowel function prior to discharge. She developed surgical wound infection and two portions of her wound are packed, as well as her previous ostomy site. She verbalized understanding of wound care instructions on discharge.  Patient was deemed suitable for discharge on 7 Days Post-Op. She was discharged home with medications and instructions as below. She voiced understanding of the instructions prior to discharge.     For more thorough information, please refer to the hospital record and operative report.    Consults:     Significant Diagnostic Studies: Labs:   BMP:   Recent Labs  Lab 04/15/18  0802 04/16/18  0523 04/17/18  0500   * 158* 113*   * 132* 132*   K 4.8 3.9 4.0   CL 99 94* 95   CO2 22* 29 22*   BUN 9 8 14   CREATININE 0.9 0.9 0.9   CALCIUM 8.6* 8.2* 9.1   MG 2.0 1.8 1.9   , CMP   Recent Labs  Lab 04/15/18  0802 04/16/18  0523 04/17/18  0500   * 132* 132*   K 4.8 3.9 4.0   CL 99 94* 95   CO2 22* 29 22*   * 158* 113*   BUN 9 8 14   CREATININE 0.9 0.9 0.9   CALCIUM 8.6* 8.2* 9.1   PROT 7.1 6.1 6.9   ALBUMIN 2.8* 2.4* 2.8*   BILITOT 1.2* 0.6 0.6   ALKPHOS 112 101 122   AST 24 16 21   ALT 20 14 15   ANIONGAP 13 9 15   ESTGFRAFRICA >60.0 >60.0 >60.0   EGFRNONAA >60.0 >60.0 >60.0   , CBC   Recent Labs  Lab 04/15/18  1017 04/16/18  0523 04/17/18  0500   WBC 12.68 10.77 10.14   HGB 9.3* 8.7* 9.5*   HCT 27.6* 26.7* 29.2*    255 268    All labs within the past 24 hours have been reviewed    Pending Diagnostic Studies:     None        Final Active Diagnoses:    Diagnosis Date Noted POA    PRINCIPAL PROBLEM:  Incisional hernia [K43.2] 04/10/2018 Yes    Wound infection after surgery [T81.4XXA] 04/16/2018 No    Hypertension [I10] 04/11/2018 Yes    Depression [F32.9] 04/11/2018 Yes      Problems Resolved During this Admission:    Diagnosis Date Noted Date Resolved POA      Patient Active Problem List   Diagnosis    Incisional hernia    Hypertension    Depression    Wound infection after surgery     Discharged Condition: good    Disposition: Home or Self Care    Follow Up:  Follow-up Information     Teo Baker MD In 2 weeks.    Specialty:  General Surgery  Contact information:  [address]  [facility] [address]  [phone]                 Patient Instructions:     Diet Adult Regular     Lifting restrictions   Order Comments: No heavy lifting, nothing heavier than 10lbs     Notify your health care provider if you experience any of the following:  difficulty breathing or increased cough     Notify your health care provider if you experience any of the following:  redness, tenderness, or signs of infection (pain, swelling, redness, odor or green/yellow discharge around incision site)     Notify your health care provider if you experience any of the following:  severe uncontrolled pain     Notify your health care provider if you experience any of the following:  persistent nausea and vomiting or diarrhea     Notify your health care provider if you experience any of the following:  temperature >100.4     Change dressing (specify)   Order Comments: Change dressing for each wound, daily using gauze and tape or tegaderm. Ok to shower. No bathing, swimming or soaking in a tub.       Medications:  Reconciled Home Medications:      Medication List      START taking these medications    ciprofloxacin HCl 500 MG tablet  Commonly known as:  CIPRO  Take 1 tablet (500 mg total) by mouth every 12 (twelve) hours.     docusate sodium 50 MG capsule  Commonly known as:  COLACE  Take 1 capsule (50 mg total) by mouth 2 (two) times daily.     ibuprofen 800 MG tablet  Commonly known as:  ADVIL,MOTRIN  Take 1 tablet (800 mg total) by mouth 3 (three) times daily as needed for Pain.     metroNIDAZOLE 500 MG tablet  Commonly known as:  FLAGYL  Take 1 tablet (500 mg total) by mouth every 8 (eight) hours.     oxyCODONE-acetaminophen 5-325 mg per tablet  Commonly known as:  PERCOCET  Take 1 tablet by mouth every 4 (four) hours as needed.        CONTINUE taking these medications    * atenolol-chlorthalidone 100-25 mg per tablet  Commonly known as:  TENORETIC  Take 1 tablet by mouth.     * atenolol-chlorthalidone 50-25 mg  Tab  Commonly known as:  TENORETIC  Take 1 tablet by mouth once daily.     escitalopram oxalate 20 MG tablet  Commonly known as:  LEXAPRO  Take 20 mg by mouth.     gabapentin 100 MG capsule  Commonly known as:  NEURONTIN  Take 100 mg by mouth 2 (two) times daily.     hydrOXYzine pamoate 25 MG Cap  Commonly known as:  VISTARIL  Take 25 mg by mouth every 8 (eight) hours as needed.     losartan 100 MG tablet  Commonly known as:  COZAAR  Take 100 mg by mouth every evening.     traZODone 50 MG tablet  Commonly known as:  DESYREL  Take 50 mg by mouth nightly as needed.        * This list has 2 medication(s) that are the same as other medications prescribed for you. Read the directions carefully, and ask your doctor or other care provider to review them with you.            STOP taking these medications    hydrocodone-acetaminophen 10-325mg  mg Tab  Commonly known as:  NORCO     hydrocodone-acetaminophen 5-325mg 5-325 mg per tablet  Commonly known as:  NORCO          Time spent on the discharge of patient: 2 minutes    Martha Cho PA-C  General Surgery  [facility]

## 2018-04-17 NOTE — PROGRESS NOTES
Pt disharged to home. Discharge instructions given verbally and hard copy provided to patient. Hard copy prescription given to patient. Left UA 22g g IV d/c'd with cath tip in place. Pt remains free of falls. No acute pain or distress noted.

## 2018-04-17 NOTE — SUBJECTIVE & OBJECTIVE
Interval History:   Patient seen and examined, no acute events overnight  Tolerating regular diet, no nausea/vomiting  Pain well controlled, ambulating  Afebrile/VSS    Medications:  Continuous Infusions:  Scheduled Meds:   atenolol  100 mg Oral Daily    chlorthalidone  25 mg Oral Daily    ciprofloxacin HCl  500 mg Oral Q12H    enoxaparin  40 mg Subcutaneous Daily    escitalopram oxalate  20 mg Oral Daily    gabapentin  100 mg Oral BID    losartan  100 mg Oral QHS    metroNIDAZOLE  500 mg Oral Q8H     PRN Meds:diphenhydrAMINE, hydrOXYzine pamoate, morphine, ondansetron, oxyCODONE-acetaminophen, oxyCODONE-acetaminophen, promethazine (PHENERGAN) IVPB, ramelteon, sodium chloride 0.9%, traZODone     Review of patient's allergies indicates:  No Known Allergies  Objective:     Vital Signs (Most Recent):  Temp: 98.4 °F (36.9 °C) (04/17/18 0423)  Pulse: 68 (04/17/18 0423)  Resp: 18 (04/17/18 0423)  BP: 113/65 (04/17/18 0423)  SpO2: 99 % (04/17/18 0423) Vital Signs (24h Range):  Temp:  [96.8 °F (36 °C)-98.5 °F (36.9 °C)] 98.4 °F (36.9 °C)  Pulse:  [60-73] 68  Resp:  [17-20] 18  SpO2:  [96 %-100 %] 99 %  BP: (107-128)/(57-79) 113/65     Weight: 99.7 kg (219 lb 12.8 oz)  Body mass index is 36.58 kg/m².    Intake/Output - Last 3 Shifts       04/15 0700 - 04/16 0659 04/16 0700 - 04/17 0659    P.O. 1090 858    IV Piggyback 700     Total Intake(mL/kg) 1790 (18) 858 (8.6)    Urine (mL/kg/hr) 550 (0.2) 1800 (0.8)    Stool 0 (0)     Total Output 550 1800    Net +1240 -942          Urine Occurrence 4 x 3 x    Stool Occurrence 2 x           Physical Exam   Constitutional: She is oriented to person, place, and time. She appears well-developed and well-nourished. No distress.   HENT:   Head: Normocephalic and atraumatic.   Cardiovascular: Normal rate and regular rhythm.    Pulmonary/Chest: Effort normal. No respiratory distress.   Abdominal: Tenderness: minimal, incisional.   Soft, appropriate TTP, non-distended  Two open areas  of midline incision packed with gauze  Previous ostomy site packed  -all three sites with good granulation tissue at base, no signs or symptoms of infection appreciated; packing changed at bedside   Neurological: She is alert and oriented to person, place, and time.     Significant Labs:  CBC:     Recent Labs  Lab 04/17/18  0500   WBC 10.14   RBC 3.21*   HGB 9.5*   HCT 29.2*      MCV 91   MCH 29.6   MCHC 32.5     BMP:     Recent Labs  Lab 04/16/18  0523   *   *   K 3.9   CL 94*   CO2 29   BUN 8   CREATININE 0.9   CALCIUM 8.2*   MG 1.8     CMP:     Recent Labs  Lab 04/16/18  0523   *   CALCIUM 8.2*   ALBUMIN 2.4*   PROT 6.1   *   K 3.9   CO2 29   CL 94*   BUN 8   CREATININE 0.9   ALKPHOS 101   ALT 14   AST 16   BILITOT 0.6     LFTs:     Recent Labs  Lab 04/16/18  0523   ALT 14   AST 16   ALKPHOS 101   BILITOT 0.6   PROT 6.1   ALBUMIN 2.4*

## 2018-04-17 NOTE — PLAN OF CARE
09:20 AM Covering SW, yesterday, was unable to obtain  Agency for patient's wound care/Barrier: due to her insurer, [employer]. VIDHI Santiago, MONTSERRAT, aware.   Updated floor nurse, Mackenzie ([phone]). She reports she taught patient yesterday, w/patient verbalizing her understanding & will make sure she has wound care supplies to start her off with. She reports her ride isn't coming until later today.

## 2018-04-17 NOTE — PROGRESS NOTES
[facility]  General Surgery  Progress Note    Subjective:       Post-Op Info:  Procedure(s) (LRB):  REPAIR-HERNIA-INCISIONAL-INITIAL Open (N/A)  COLOSTOMY-REVERSAL (N/A)   7 Days Post-Op     Interval History:   Patient seen and examined, no acute events overnight  Tolerating regular diet, no nausea/vomiting  Pain well controlled, ambulating  Afebrile/VSS    Medications:  Continuous Infusions:  Scheduled Meds:   atenolol  100 mg Oral Daily    chlorthalidone  25 mg Oral Daily    ciprofloxacin HCl  500 mg Oral Q12H    enoxaparin  40 mg Subcutaneous Daily    escitalopram oxalate  20 mg Oral Daily    gabapentin  100 mg Oral BID    losartan  100 mg Oral QHS    metroNIDAZOLE  500 mg Oral Q8H     PRN Meds:diphenhydrAMINE, hydrOXYzine pamoate, morphine, ondansetron, oxyCODONE-acetaminophen, oxyCODONE-acetaminophen, promethazine (PHENERGAN) IVPB, ramelteon, sodium chloride 0.9%, traZODone     Review of patient's allergies indicates:  No Known Allergies  Objective:     Vital Signs (Most Recent):  Temp: 98.4 °F (36.9 °C) (04/17/18 0423)  Pulse: 68 (04/17/18 0423)  Resp: 18 (04/17/18 0423)  BP: 113/65 (04/17/18 0423)  SpO2: 99 % (04/17/18 0423) Vital Signs (24h Range):  Temp:  [96.8 °F (36 °C)-98.5 °F (36.9 °C)] 98.4 °F (36.9 °C)  Pulse:  [60-73] 68  Resp:  [17-20] 18  SpO2:  [96 %-100 %] 99 %  BP: (107-128)/(57-79) 113/65     Weight: 99.7 kg (219 lb 12.8 oz)  Body mass index is 36.58 kg/m².    Intake/Output - Last 3 Shifts       04/15 0700 - 04/16 0659 04/16 0700 - 04/17 0659    P.O. 1090 858    IV Piggyback 700     Total Intake(mL/kg) 1790 (18) 858 (8.6)    Urine (mL/kg/hr) 550 (0.2) 1800 (0.8)    Stool 0 (0)     Total Output 550 1800    Net +1240 -942          Urine Occurrence 4 x 3 x    Stool Occurrence 2 x           Physical Exam   Constitutional: She is oriented to person, place, and time. She appears well-developed and well-nourished. No distress.   HENT:   Head: Normocephalic and atraumatic.    Cardiovascular: Normal rate and regular rhythm.    Pulmonary/Chest: Effort normal. No respiratory distress.   Abdominal: Tenderness: minimal, incisional.   Soft, appropriate TTP, non-distended  Two open areas of midline incision packed with gauze  Previous ostomy site packed  -all three sites with good granulation tissue at base, no signs or symptoms of infection appreciated; packing changed at bedside   Neurological: She is alert and oriented to person, place, and time.     Significant Labs:  CBC:     Recent Labs  Lab 04/17/18  0500   WBC 10.14   RBC 3.21*   HGB 9.5*   HCT 29.2*      MCV 91   MCH 29.6   MCHC 32.5     BMP:     Recent Labs  Lab 04/16/18  0523   *   *   K 3.9   CL 94*   CO2 29   BUN 8   CREATININE 0.9   CALCIUM 8.2*   MG 1.8     CMP:     Recent Labs  Lab 04/16/18  0523   *   CALCIUM 8.2*   ALBUMIN 2.4*   PROT 6.1   *   K 3.9   CO2 29   CL 94*   BUN 8   CREATININE 0.9   ALKPHOS 101   ALT 14   AST 16   BILITOT 0.6     LFTs:     Recent Labs  Lab 04/16/18  0523   ALT 14   AST 16   ALKPHOS 101   BILITOT 0.6   PROT 6.1   ALBUMIN 2.4*     Assessment/Plan:     * Incisional hernia    53 yo female s/p incisional hernia repair, colostomy take down 4/10/18    -regular diet  -PO pain  -nausea meds PRN  -HLIV  -DVT prophylaxis  -PT/OT/IS  -ready for d/c         Wound infection after surgery    -f/u cultures  -PO cipro/flagyl - started 4/14/18        Depression    Home meds  -lexapro, trazadone, restoril          Hypertension    Home meds  -atenolol, chlorthalidone            Fely Washburn MD  General Surgery  [facility]

## 2018-04-17 NOTE — ASSESSMENT & PLAN NOTE
53 yo female s/p incisional hernia repair, colostomy take down 4/10/18    -regular diet  -PO pain  -nausea meds PRN  -HLIV  -DVT prophylaxis  -PT/OT/IS  -ready for d/c

## 2018-04-20 ENCOUNTER — NURSE TRIAGE (OUTPATIENT)
Dept: ADMINISTRATIVE | Facility: CLINIC | Age: 53
End: 2018-04-20

## 2018-04-20 LAB — BACTERIA SPEC ANAEROBE CULT: NORMAL

## 2018-04-20 NOTE — TELEPHONE ENCOUNTER
"    Reason for Disposition   Caller requesting a NON-URGENT new prescription or refill and triager unable to refill per unit policy    Protocols used: ST MEDICATION QUESTION CALL-A-AH    Barbara called to say she thinks she has a vaginal yeast infection due to the antibiotics she has been taking.  She is requesting a prescription for this.  Her PCP is in [place], [place], and she is now back in [place]. I did encourage her to follow with Noah Cordero MD , pcp for this, but she states "since the yeast is due to all the antibiotics I had for the surgery and all, I want you to message the surgeon to see if he will call in a prescription for me."   Message to Dr Baker.  Please contact caller directly with any additional care advice at [phone].  Her pharmacy is [facility] in [place], phone [phone].     "

## 2018-04-26 ENCOUNTER — OFFICE VISIT (OUTPATIENT)
Dept: SURGERY | Facility: CLINIC | Age: 53
End: 2018-04-26
Payer: MEDICAID

## 2018-04-26 VITALS
BODY MASS INDEX: 35.99 KG/M2 | DIASTOLIC BLOOD PRESSURE: 87 MMHG | WEIGHT: 216 LBS | HEIGHT: 65 IN | SYSTOLIC BLOOD PRESSURE: 148 MMHG | HEART RATE: 92 BPM

## 2018-04-26 DIAGNOSIS — Z98.890 S/P COLOSTOMY TAKEDOWN: Primary | ICD-10-CM

## 2018-04-26 PROCEDURE — 99024 POSTOP FOLLOW-UP VISIT: CPT | Mod: ,,, | Performed by: PHYSICIAN ASSISTANT

## 2018-04-26 PROCEDURE — 99213 OFFICE O/P EST LOW 20 MIN: CPT | Mod: PBBFAC | Performed by: PHYSICIAN ASSISTANT

## 2018-04-26 PROCEDURE — 99999 PR PBB SHADOW E&M-EST. PATIENT-LVL III: CPT | Mod: PBBFAC,,, | Performed by: PHYSICIAN ASSISTANT

## 2018-04-26 RX ORDER — HYDROCODONE BITARTRATE AND ACETAMINOPHEN 5; 325 MG/1; MG/1
1 TABLET ORAL EVERY 6 HOURS PRN
Qty: 15 TABLET | Refills: 0 | Status: ON HOLD | OUTPATIENT
Start: 2018-04-26 | End: 2018-09-13 | Stop reason: HOSPADM

## 2018-04-26 RX ORDER — TRAMADOL HYDROCHLORIDE 50 MG/1
50 TABLET ORAL EVERY 6 HOURS PRN
Qty: 25 TABLET | Refills: 0 | Status: SHIPPED | OUTPATIENT
Start: 2018-04-26 | End: 2018-05-06

## 2018-04-26 RX ORDER — GABAPENTIN 300 MG/1
300 CAPSULE ORAL 3 TIMES DAILY
Qty: 90 CAPSULE | Refills: 11 | Status: SHIPPED | OUTPATIENT
Start: 2018-04-26 | End: 2021-02-04

## 2018-04-26 RX ORDER — SULFAMETHOXAZOLE AND TRIMETHOPRIM 800; 160 MG/1; MG/1
1 TABLET ORAL 2 TIMES DAILY
Qty: 20 TABLET | Refills: 0 | Status: SHIPPED | OUTPATIENT
Start: 2018-04-26 | End: 2018-09-10 | Stop reason: CLARIF

## 2018-04-26 NOTE — PROGRESS NOTES
HPI:  The patient is status post-colostomy reversal on 4/10/18. She still has pain, especially with dressing changes. She is eating well. The patient denies nausea, vomiting, fever or sweats. She denies constipation or diarrhea, but states she is having multiple soft, formed bowel movements a day. She is taking narcotics as well as stool softeners. She reports family changing her dressing daily. She is showering over the wounds. Reports new hypotension at home. She is almost finished her antibiotics.    PHYSICAL EXAM:  Physical Exam   Constitutional: She is oriented to person, place, and time. She appears well-developed and well-nourished. No distress.   HENT:   Head: Normocephalic and atraumatic.   Cardiovascular: Normal rate and regular rhythm.    Pulmonary/Chest: Effort normal. No respiratory distress.   Abdominal:   Soft, appropriate TTP, non-distended  Three open wound on abdomen - two in midline, one previous colostomy site - all clean, dry, no drainage, no signs or symptoms of infection appreciated  Small amount of serous drainage from midline, +TTP at these areas of drainage, minimal TTP   Neurological: She is alert and oriented to person, place, and time.   Psychiatric: She has a normal mood and affect.       A/P  51 yo female s.p colostomy reversal  -refilled pain meds #15 norco 5mg, #25 ultram 5mg, recommended following up with physician who prescribed neurontin to increase dosage to help alleviate nerve pain associated with packing changes  -continue packing wound daily, ok to shower  -bactrim [id number] for possible wound infection  -no heavy lifting, nothing heavier than 10lbs  -no driving on narcotics  -will follow up with PCP regarding hypotension and BP meds  -RTC two weeks for wound check

## 2018-05-09 ENCOUNTER — OFFICE VISIT (OUTPATIENT)
Dept: SURGERY | Facility: CLINIC | Age: 53
End: 2018-05-09
Payer: COMMERCIAL

## 2018-05-09 VITALS
WEIGHT: 213.38 LBS | TEMPERATURE: 99 F | BODY MASS INDEX: 35.55 KG/M2 | SYSTOLIC BLOOD PRESSURE: 147 MMHG | HEIGHT: 65 IN | DIASTOLIC BLOOD PRESSURE: 86 MMHG | HEART RATE: 65 BPM

## 2018-05-09 DIAGNOSIS — Z98.890 S/P COLOSTOMY TAKEDOWN: Primary | ICD-10-CM

## 2018-05-09 PROCEDURE — 99999 PR PBB SHADOW E&M-EST. PATIENT-LVL III: CPT | Mod: PBBFAC,,, | Performed by: PHYSICIAN ASSISTANT

## 2018-05-09 PROCEDURE — 99024 POSTOP FOLLOW-UP VISIT: CPT | Mod: ,,, | Performed by: PHYSICIAN ASSISTANT

## 2018-05-09 PROCEDURE — 99213 OFFICE O/P EST LOW 20 MIN: CPT | Mod: PBBFAC | Performed by: PHYSICIAN ASSISTANT

## 2018-05-09 RX ORDER — SULFAMETHOXAZOLE AND TRIMETHOPRIM 800; 160 MG/1; MG/1
TABLET ORAL
COMMUNITY
End: 2018-09-10 | Stop reason: CLARIF

## 2018-05-09 NOTE — PROGRESS NOTES
HPI:  The patient is status post-colostomy reversal on 4/10/18. Her pain has improved. She is eating well. The patient denies nausea, vomiting, fever or sweats. She denies constipation or diarrhea, and reports having normal bowel movements, but does have occasional pelvic crampping. She is taking narcotics as well as stool softeners. She reports family changing her dressings daily. She is showering over the wounds. She has completed her abx. Reports minimal drainage from incisions.    PHYSICAL EXAM:  Physical Exam   Constitutional: She is oriented to person, place, and time. She appears well-developed and well-nourished. No distress.   HENT:   Head: Normocephalic and atraumatic.   Cardiovascular: Normal rate and regular rhythm.    Pulmonary/Chest: Effort normal. No respiratory distress.   Abdominal:   Soft, appropriate TTP, non-distended  Three open wound on abdomen - two in midline, one previous colostomy site, all significantly decreased in size from previous visit - all clean, dry, no drainage, no signs or symptoms of infection appreciated  Good granulation tissue at bases of wounds  One small blister noted in midline   Neurological: She is alert and oriented to person, place, and time.   Psychiatric: She has a normal mood and affect.       A/P  53 yo female s.p colostomy reversal  -refilled pain meds #15 norco 5mg, #25 ultram 5mg, recommended following up with physician who prescribed neurontin to increase dosage to help alleviate nerve pain associated with packing changes  -continue packing wound daily, ok to shower  -bactrim q80mcgg for possible wound infection  -no heavy lifting, nothing heavier than 10lbs  -no driving on narcotics  -will follow up with PCP regarding hypotension and BP meds  -RTC two weeks for wound check

## 2018-05-22 ENCOUNTER — OFFICE VISIT (OUTPATIENT)
Dept: SURGERY | Facility: CLINIC | Age: 53
End: 2018-05-22
Payer: COMMERCIAL

## 2018-05-22 VITALS
DIASTOLIC BLOOD PRESSURE: 92 MMHG | WEIGHT: 213 LBS | HEIGHT: 65 IN | BODY MASS INDEX: 35.49 KG/M2 | HEART RATE: 68 BPM | SYSTOLIC BLOOD PRESSURE: 164 MMHG

## 2018-05-22 DIAGNOSIS — Z98.890 S/P COLOSTOMY TAKEDOWN: Primary | ICD-10-CM

## 2018-05-22 PROCEDURE — 99024 POSTOP FOLLOW-UP VISIT: CPT | Mod: ,,, | Performed by: PHYSICIAN ASSISTANT

## 2018-05-22 PROCEDURE — 99999 PR PBB SHADOW E&M-EST. PATIENT-LVL III: CPT | Mod: PBBFAC,,, | Performed by: PHYSICIAN ASSISTANT

## 2018-05-22 PROCEDURE — 99213 OFFICE O/P EST LOW 20 MIN: CPT | Mod: PBBFAC | Performed by: PHYSICIAN ASSISTANT

## 2018-05-22 RX ORDER — OXYCODONE AND ACETAMINOPHEN 5; 325 MG/1; MG/1
TABLET ORAL
COMMUNITY
End: 2018-05-22 | Stop reason: SDUPTHER

## 2018-05-22 NOTE — PROGRESS NOTES
HPI:  The patient is status post-colostomy reversal on 4/10/18. Her pain has improved. She is eating well. The patient denies nausea, vomiting, fever or sweats. She denies constipation or diarrhea, and reports having normal bowel movements, but does have occasional pelvic cramping. Pain is reasonably well controlled with current pain regimen. She is showering over the wounds. She has completed her abx. Reports no further drainage from incisions.    PHYSICAL EXAM:  Physical Exam   Constitutional: She is oriented to person, place, and time. She appears well-developed and well-nourished. No distress.   HENT:   Head: Normocephalic and atraumatic.   Cardiovascular: Normal rate and regular rhythm.    Pulmonary/Chest: Effort normal. No respiratory distress.   Abdominal:   Soft, appropriate TTP, non-distended  Three scabs on abdomen, minimal TTP, no signs or symptoms of infection appreciated     Neurological: She is alert and oriented to person, place, and time.   Psychiatric: She has a normal mood and affect.       A/P  51 yo female s.p colostomy reversal  -no further pain medication to be prescribed from this office, she will follow up with PCP, and pain management doctor  -continue showering as usual  -no heavy lifting, nothing heavier than 10lbs for 1-2 more weeks  -no driving on narcotics  -will follow up with PCP regarding dietician recommendations (diet for diverticulitis)  -RTC PRN

## 2018-08-24 ENCOUNTER — TELEPHONE (OUTPATIENT)
Dept: SURGERY | Facility: CLINIC | Age: 53
End: 2018-08-24

## 2018-08-24 NOTE — TELEPHONE ENCOUNTER
Pt called to report that she is currently an inpatient at [facility] in [place], [place] for a partial bowel obstruction.  She is being treated there, is feeling better and will call back if she needs to be seen here for continued abdominal pain.  She verbalized understanding.

## 2018-08-29 ENCOUNTER — OFFICE VISIT (OUTPATIENT)
Dept: SURGERY | Facility: CLINIC | Age: 53
End: 2018-08-29
Payer: COMMERCIAL

## 2018-08-29 VITALS
DIASTOLIC BLOOD PRESSURE: 74 MMHG | TEMPERATURE: 98 F | WEIGHT: 221.44 LBS | HEART RATE: 69 BPM | BODY MASS INDEX: 36.89 KG/M2 | SYSTOLIC BLOOD PRESSURE: 154 MMHG | HEIGHT: 65 IN

## 2018-08-29 DIAGNOSIS — K43.2 INCISIONAL HERNIA, WITHOUT OBSTRUCTION OR GANGRENE: Primary | ICD-10-CM

## 2018-08-29 PROCEDURE — 99999 PR PBB SHADOW E&M-EST. PATIENT-LVL III: CPT | Mod: PBBFAC,,, | Performed by: SURGERY

## 2018-08-29 PROCEDURE — 99213 OFFICE O/P EST LOW 20 MIN: CPT | Mod: S$GLB,,, | Performed by: SURGERY

## 2018-08-29 RX ORDER — ESTRADIOL 1 MG/1
1 TABLET ORAL DAILY
COMMUNITY
Start: 2018-08-25 | End: 2020-12-16 | Stop reason: CLARIF

## 2018-08-29 NOTE — PROGRESS NOTES
Patient ID: Barbara Ortiz is a 53 y.o. female.    Chief Complaint: Follow-up    HPI  Ms. Ortiz is a 53 y.o. Female with a PMH of HTN and diverticulitis who presents to the clinic c/o twisting and cramping abdominal pain. She underwent ventral hernia repair and colostomy reversal in April by Dr. Baker. Patient presented to the ER last Wednesday c/o of abdominal pain, was admitted for bowel obstruction, and discharged after 4 days. A large incisional hernia was found upon imaging, and patient was referred here.     Review of Systems   Constitutional: Positive for appetite change. Negative for activity change, chills and fever.   HENT: Negative for congestion and trouble swallowing.    Eyes: Negative for discharge and redness.   Respiratory: Negative for apnea and wheezing.    Gastrointestinal: Positive for abdominal pain and nausea. Negative for constipation, diarrhea and vomiting.   Musculoskeletal: Positive for back pain (referred from abdomen). Negative for gait problem.   Skin: Negative for color change and rash.   Neurological: Negative for tremors and speech difficulty.   Psychiatric/Behavioral: Negative for behavioral problems and confusion.       Current Outpatient Medications   Medication Sig Dispense Refill    docusate sodium (COLACE) 50 MG capsule Take 1 capsule (50 mg total) by mouth 2 (two) times daily.  0    gabapentin (NEURONTIN) 300 MG capsule Take 1 capsule (300 mg total) by mouth 3 (three) times daily. 90 capsule 11    ibuprofen (ADVIL,MOTRIN) 800 MG tablet Take 1 tablet (800 mg total) by mouth 3 (three) times daily as needed for Pain. 21 tablet 0    losartan (COZAAR) 100 MG tablet Take 0.5 tablets (50 mg total) by mouth every evening. 15 tablet 0    traZODone (DESYREL) 50 MG tablet Take 50 mg by mouth nightly as needed.       atenolol-chlorthalidone (TENORETIC) 50-25 mg Tab Take 1 tablet by mouth once daily.       escitalopram oxalate (LEXAPRO) 20 MG tablet Take 20 mg by mouth.       estradiol (ESTRACE) 1 MG tablet       gabapentin (NEURONTIN) 100 MG capsule Take 100 mg by mouth 2 (two) times daily.       hydrocodone-acetaminophen 5-325mg (NORCO) 5-325 mg per tablet Take 1 tablet by mouth every 6 (six) hours as needed for Pain. 15 tablet 0    hydrOXYzine pamoate (VISTARIL) 25 MG Cap Take 25 mg by mouth every 8 (eight) hours as needed.       oxyCODONE-acetaminophen (PERCOCET) 5-325 mg per tablet Take 1 tablet by mouth every 4 (four) hours as needed. 42 tablet 0    sulfamethoxazole-trimethoprim 800-160mg (BACTRIM DS) 800-160 mg Tab Take 1 tablet by mouth 2 (two) times daily. 20 tablet 0    sulfamethoxazole-trimethoprim 800-160mg (BACTRIM DS) 800-160 mg Tab Take by mouth.       No current facility-administered medications for this visit.        Review of patient's allergies indicates:  No Known Allergies    Past Medical History:   Diagnosis Date    Anxiety     Diverticulitis     Fibromyalgia     Hypertension     Sleep apnea        Past Surgical History:   Procedure Laterality Date    CARPAL TUNNEL RELEASE Right     CHOLECYSTECTOMY      COLOSTOMY      LUMBAR FUSION      PARTIAL HYSTERECTOMY  2002       No family history on file.    Social History     Socioeconomic History    Marital status: Other     Spouse name: Not on file    Number of children: Not on file    Years of education: Not on file    Highest education level: Not on file   Social Needs    Financial resource strain: Not on file    Food insecurity - worry: Not on file    Food insecurity - inability: Not on file    Transportation needs - medical: Not on file    Transportation needs - non-medical: Not on file   Occupational History    Not on file   Tobacco Use    Smoking status: Never Smoker    Smokeless tobacco: Never Used   Substance and Sexual Activity    Alcohol use: Yes     Alcohol/week: 0.6 oz     Types: 1 Shots of liquor per week    Drug use: Not on file    Sexual activity: Not on file   Other Topics  Concern    Not on file   Social History Narrative    Not on file       Vitals:    08/29/18 1029   BP: (!) 154/74   Pulse: 69   Temp: 97.7 °F (36.5 °C)       Physical Exam   Constitutional: She is oriented to person, place, and time. She appears well-developed and well-nourished. No distress.   HENT:   Head: Normocephalic and atraumatic.   Eyes: EOM are normal. No scleral icterus.   Neck: Normal range of motion. Neck supple.   Cardiovascular: Normal rate and regular rhythm.   Pulmonary/Chest: Effort normal. No respiratory distress.   Abdominal: Soft. Bowel sounds are normal. She exhibits no distension. There is tenderness. A hernia (incisional) is present.   Musculoskeletal: Normal range of motion. She exhibits no deformity.   Neurological: She is alert and oriented to person, place, and time.   Skin: Skin is warm and dry. She is not diaphoretic.   Psychiatric: She has a normal mood and affect. Her behavior is normal. Judgment and thought content normal.   Nursing note and vitals reviewed.      Assessment & Plan:      Incisional, ventral hernia   -Schedule for hernia repair with mesh  -Consent obtained

## 2018-09-06 DIAGNOSIS — K43.2 INCISIONAL HERNIA: ICD-10-CM

## 2018-09-06 RX ORDER — SODIUM CHLORIDE 9 MG/ML
INJECTION, SOLUTION INTRAVENOUS CONTINUOUS
Status: CANCELLED | OUTPATIENT
Start: 2018-09-06

## 2018-09-10 ENCOUNTER — ANESTHESIA EVENT (OUTPATIENT)
Dept: SURGERY | Facility: HOSPITAL | Age: 53
DRG: 355 | End: 2018-09-10
Payer: COMMERCIAL

## 2018-09-10 ENCOUNTER — TELEPHONE (OUTPATIENT)
Dept: SURGERY | Facility: CLINIC | Age: 53
End: 2018-09-10

## 2018-09-10 RX ORDER — ONDANSETRON HYDROCHLORIDE 8 MG/1
8 TABLET, FILM COATED ORAL EVERY 8 HOURS PRN
COMMUNITY

## 2018-09-10 NOTE — TELEPHONE ENCOUNTER
Notified patient to arrive [facility] tomorrow 9/11/18 at 10:15am for surgery with Dr. Baker.  Pre-Op instructions reinforced.  She verbalized understanding.

## 2018-09-10 NOTE — PRE-PROCEDURE INSTRUCTIONS
PreOp Instructions given:   - Verbal medication information (what to hold and what to take)   - NPO guidelines   - Arrival place directions given;  - Bathing with antibacterial soap   - Don't wear any jewelry or bring any valuables AM of surgery   - No makeup or moisturizer to face   - No perfume/cologne, powder, lotions or aftershave   Pt. verbalized understanding.   Denies any history of side effects or issues with anesthesia or sedation.

## 2018-09-10 NOTE — ANESTHESIA PREPROCEDURE EVALUATION
[facility]  Anesthesia Pre-Operative Evaluation         Patient Name: Barbara Ortiz  YOB: 1965  MRN: [id number]    SUBJECTIVE:     Pre-operative evaluation for Procedure(s) (LRB):  REPAIR,HERNIA,INCISIONAL OR VENTRAL,WITHOUT HISTORY OF PRIOR REPAIR Open with Mesh (N/A)     09/10/2018    Barbara Ortiz is a 53 y.o. female w/ a significant PMHx of anxiety/depression, diverticulitis, HTN, fibromyalgia, ODALIS and recurrent incisional hernia who presents for the above procedure(s).      LDA: None documented.    Prev airway: DL; Mac #3 Bougie intubation; Grade 1 view?    Drips: None documented.    Patient Active Problem List   Diagnosis    Incisional hernia    Hypertension    Depression    Wound infection after surgery       Review of patient's allergies indicates:  No Known Allergies    Current Outpatient Medications:  No current facility-administered medications for this encounter.     Current Outpatient Medications:     docusate sodium (COLACE) 50 MG capsule, Take 1 capsule (50 mg total) by mouth 2 (two) times daily., Disp: , Rfl: 0    escitalopram oxalate (LEXAPRO) 20 MG tablet, Take 20 mg by mouth., Disp: , Rfl:     estradiol (ESTRACE) 1 MG tablet, Take 1 mg by mouth once daily. , Disp: , Rfl:     gabapentin (NEURONTIN) 300 MG capsule, Take 1 capsule (300 mg total) by mouth 3 (three) times daily., Disp: 90 capsule, Rfl: 11    hydrOXYzine pamoate (VISTARIL) 25 MG Cap, Take 25 mg by mouth every 8 (eight) hours as needed. , Disp: , Rfl:     losartan (COZAAR) 100 MG tablet, Take 0.5 tablets (50 mg total) by mouth every evening. (Patient taking differently: Take 100 mg by mouth once daily. ), Disp: 15 tablet, Rfl: 0    ondansetron (ZOFRAN) 8 MG tablet, Take 8 mg by mouth every 8 (eight) hours as needed for Nausea., Disp: , Rfl:     traZODone (DESYREL) 50 MG tablet, Take 50 mg by mouth nightly as needed. , Disp: , Rfl:     hydrocodone-acetaminophen 5-325mg (NORCO) 5-325 mg  per tablet, Take 1 tablet by mouth every 6 (six) hours as needed for Pain., Disp: 15 tablet, Rfl: 0    Past Surgical History:   Procedure Laterality Date    CARPAL TUNNEL RELEASE Right     CHOLECYSTECTOMY      COLOSTOMY      COLOSTOMY-REVERSAL N/A 4/10/2018    Performed by Teo Baker MD at [facility] OR [address] [place] [address]    LUMBAR FUSION      PARTIAL HYSTERECTOMY  2002    REPAIR-HERNIA-INCISIONAL-INITIAL Open N/A 4/10/2018    Performed by Teo Baker MD at [facility] OR [address] [place] [address]       Social History     Socioeconomic History    Marital status: Other     Spouse name: Not on file    Number of children: Not on file    Years of education: Not on file    Highest education level: Not on file   Social Needs    Financial resource strain: Not on file    Food insecurity - worry: Not on file    Food insecurity - inability: Not on file    Transportation needs - medical: Not on file    Transportation needs - non-medical: Not on file   Occupational History    Not on file   Tobacco Use    Smoking status: Never Smoker    Smokeless tobacco: Never Used   Substance and Sexual Activity    Alcohol use: Yes     Alcohol/week: 0.6 oz     Types: 1 Shots of liquor per week    Drug use: Not on file    Sexual activity: Not on file   Other Topics Concern    Not on file   Social History Narrative    Not on file       OBJECTIVE:     Vital Signs Range (Last 24H):         Significant Labs:  Lab Results   Component Value Date    WBC 10.14 04/17/2018    HGB 9.5 (L) 04/17/2018    HCT 29.2 (L) 04/17/2018     04/17/2018    ALT 15 04/17/2018    AST 21 04/17/2018     (L) 04/17/2018    K 4.0 04/17/2018    CL 95 04/17/2018    CREATININE 0.9 04/17/2018    BUN 14 04/17/2018    CO2 22 (L) 04/17/2018       Diagnostic Studies: No relevant studies.    EKG: No recent studies available.    2D ECHO:  No results found for this or any previous visit.      ASSESSMENT/PLAN:       Anesthesia Evaluation    I have reviewed the Patient  Summary Reports.     I have reviewed the Medications.     Review of Systems  Anesthesia Hx:  No problems with previous Anesthesia  History of prior surgery of interest to airway management or planning:  Denies Personal Hx of Anesthesia complications.   Social:  Non-Smoker, Social Alcohol Use    Hematology/Oncology:     Oncology Normal    -- Anemia:   EENT/Dental:EENT/Dental Normal   Cardiovascular:   Hypertension    Pulmonary:   Sleep Apnea    Renal/:  Renal/ Normal     Hepatic/GI:  Hepatic/GI Normal    Neurological:   Fibromyalgia    Endocrine:  Endocrine Normal    Psych:   anxiety depression             Anesthesia Plan  Type of Anesthesia, risks & benefits discussed:  Anesthesia Type:  general  Patient's Preference:   Intra-op Monitoring Plan: standard ASA monitors  Intra-op Monitoring Plan Comments:   Post Op Pain Control Plan: per primary service following discharge from PACU and multimodal analgesia  Post Op Pain Control Plan Comments:   Induction:   IV  Beta Blocker:  Patient is not currently on a Beta-Blocker (No further documentation required).       Informed Consent: Patient understands risks and agrees with Anesthesia plan.  Questions answered. Anesthesia consent signed with patient.  ASA Score: 2     Day of Surgery Review of History & Physical:    H&P update referred to the surgeon.         Ready For Surgery From Anesthesia Perspective.

## 2018-09-10 NOTE — TELEPHONE ENCOUNTER
----- Message from Marta Hutchins sent at 9/10/2018  4:10 PM CDT -----  Contact: Self  She needs to know when she needs to be there for surgery tomorrow. She was told surgery is for 11:45am but isn't sure what time to show up. Please advise.

## 2018-09-11 ENCOUNTER — ANESTHESIA (OUTPATIENT)
Dept: SURGERY | Facility: HOSPITAL | Age: 53
DRG: 355 | End: 2018-09-11
Payer: COMMERCIAL

## 2018-09-11 ENCOUNTER — HOSPITAL ENCOUNTER (INPATIENT)
Facility: HOSPITAL | Age: 53
LOS: 2 days | Discharge: HOME | DRG: 355 | End: 2018-09-13
Attending: SURGERY | Admitting: SURGERY
Payer: COMMERCIAL

## 2018-09-11 DIAGNOSIS — K43.2 INCISIONAL HERNIA: Primary | ICD-10-CM

## 2018-09-11 PROCEDURE — 63600175 PHARM REV CODE 636 W HCPCS: Performed by: PHYSICIAN ASSISTANT

## 2018-09-11 PROCEDURE — 25000003 PHARM REV CODE 250: Performed by: PHYSICIAN ASSISTANT

## 2018-09-11 PROCEDURE — 27000221 HC OXYGEN, UP TO 24 HOURS

## 2018-09-11 PROCEDURE — 0WUF0JZ SUPPLEMENT ABDOMINAL WALL WITH SYNTHETIC SUBSTITUTE, OPEN APPROACH: ICD-10-PCS | Performed by: SURGERY

## 2018-09-11 PROCEDURE — 49560 PR REPAIR INCISIONAL HERNIA,REDUCIBLE: CPT | Mod: ,,, | Performed by: SURGERY

## 2018-09-11 PROCEDURE — 63600175 PHARM REV CODE 636 W HCPCS

## 2018-09-11 PROCEDURE — S0020 INJECTION, BUPIVICAINE HYDRO: HCPCS | Performed by: SURGERY

## 2018-09-11 PROCEDURE — 25000003 PHARM REV CODE 250: Performed by: NURSE ANESTHETIST, CERTIFIED REGISTERED

## 2018-09-11 PROCEDURE — 36000707: Performed by: SURGERY

## 2018-09-11 PROCEDURE — 63600175 PHARM REV CODE 636 W HCPCS: Performed by: STUDENT IN AN ORGANIZED HEALTH CARE EDUCATION/TRAINING PROGRAM

## 2018-09-11 PROCEDURE — 63600175 PHARM REV CODE 636 W HCPCS: Performed by: SURGERY

## 2018-09-11 PROCEDURE — C1781 MESH (IMPLANTABLE): HCPCS | Performed by: SURGERY

## 2018-09-11 PROCEDURE — 25000003 PHARM REV CODE 250

## 2018-09-11 PROCEDURE — 94761 N-INVAS EAR/PLS OXIMETRY MLT: CPT

## 2018-09-11 PROCEDURE — C1729 CATH, DRAINAGE: HCPCS | Performed by: SURGERY

## 2018-09-11 PROCEDURE — 25000003 PHARM REV CODE 250: Performed by: SURGERY

## 2018-09-11 PROCEDURE — 37000008 HC ANESTHESIA 1ST 15 MINUTES: Performed by: SURGERY

## 2018-09-11 PROCEDURE — 36000706: Performed by: SURGERY

## 2018-09-11 PROCEDURE — D9220A PRA ANESTHESIA: Mod: ,,, | Performed by: ANESTHESIOLOGY

## 2018-09-11 PROCEDURE — 63600175 PHARM REV CODE 636 W HCPCS: Performed by: NURSE ANESTHETIST, CERTIFIED REGISTERED

## 2018-09-11 PROCEDURE — 71000039 HC RECOVERY, EACH ADD'L HOUR: Performed by: SURGERY

## 2018-09-11 PROCEDURE — 37000009 HC ANESTHESIA EA ADD 15 MINS: Performed by: SURGERY

## 2018-09-11 PROCEDURE — 25000003 PHARM REV CODE 250: Performed by: STUDENT IN AN ORGANIZED HEALTH CARE EDUCATION/TRAINING PROGRAM

## 2018-09-11 PROCEDURE — 71000033 HC RECOVERY, INTIAL HOUR: Performed by: SURGERY

## 2018-09-11 PROCEDURE — 49568 PR IMPLANT MESH HERNIA REPAIR/DEBRIDEMENT CLOSURE: CPT | Mod: ,,, | Performed by: SURGERY

## 2018-09-11 PROCEDURE — 12000002 HC ACUTE/MED SURGE SEMI-PRIVATE ROOM

## 2018-09-11 DEVICE — PATCH HERNIA VENTRIO 8.7X10.7: Type: IMPLANTABLE DEVICE | Site: ABDOMEN | Status: FUNCTIONAL

## 2018-09-11 RX ORDER — DEXAMETHASONE SODIUM PHOSPHATE 4 MG/ML
INJECTION, SOLUTION INTRA-ARTICULAR; INTRALESIONAL; INTRAMUSCULAR; INTRAVENOUS; SOFT TISSUE
Status: DISCONTINUED | OUTPATIENT
Start: 2018-09-11 | End: 2018-09-11

## 2018-09-11 RX ORDER — DEXTROSE MONOHYDRATE, SODIUM CHLORIDE, AND POTASSIUM CHLORIDE 50; 1.49; 9 G/1000ML; G/1000ML; G/1000ML
INJECTION, SOLUTION INTRAVENOUS CONTINUOUS
Status: DISCONTINUED | OUTPATIENT
Start: 2018-09-11 | End: 2018-09-12

## 2018-09-11 RX ORDER — PROPOFOL 10 MG/ML
VIAL (ML) INTRAVENOUS
Status: DISCONTINUED | OUTPATIENT
Start: 2018-09-11 | End: 2018-09-11

## 2018-09-11 RX ORDER — NALOXONE HCL 0.4 MG/ML
0.02 VIAL (ML) INJECTION
Status: DISCONTINUED | OUTPATIENT
Start: 2018-09-11 | End: 2018-09-12

## 2018-09-11 RX ORDER — SUCCINYLCHOLINE CHLORIDE 20 MG/ML
INJECTION INTRAMUSCULAR; INTRAVENOUS
Status: DISCONTINUED | OUTPATIENT
Start: 2018-09-11 | End: 2018-09-11

## 2018-09-11 RX ORDER — GLYCOPYRROLATE 0.2 MG/ML
INJECTION INTRAMUSCULAR; INTRAVENOUS
Status: DISCONTINUED | OUTPATIENT
Start: 2018-09-11 | End: 2018-09-11

## 2018-09-11 RX ORDER — SODIUM CHLORIDE 0.9 % (FLUSH) 0.9 %
3 SYRINGE (ML) INJECTION EVERY 8 HOURS
Status: DISCONTINUED | OUTPATIENT
Start: 2018-09-11 | End: 2018-09-13 | Stop reason: HOSPADM

## 2018-09-11 RX ORDER — HYDROMORPHONE HCL IN 0.9% NACL 6 MG/30 ML
PATIENT CONTROLLED ANALGESIA SYRINGE INTRAVENOUS CONTINUOUS
Status: DISCONTINUED | OUTPATIENT
Start: 2018-09-11 | End: 2018-09-12

## 2018-09-11 RX ORDER — FENTANYL CITRATE 50 UG/ML
INJECTION, SOLUTION INTRAMUSCULAR; INTRAVENOUS
Status: DISCONTINUED | OUTPATIENT
Start: 2018-09-11 | End: 2018-09-11

## 2018-09-11 RX ORDER — ACETAMINOPHEN 10 MG/ML
INJECTION, SOLUTION INTRAVENOUS
Status: DISCONTINUED | OUTPATIENT
Start: 2018-09-11 | End: 2018-09-11

## 2018-09-11 RX ORDER — LIDOCAINE HYDROCHLORIDE 10 MG/ML
1 INJECTION, SOLUTION EPIDURAL; INFILTRATION; INTRACAUDAL; PERINEURAL ONCE
Status: DISCONTINUED | OUTPATIENT
Start: 2018-09-11 | End: 2018-09-13 | Stop reason: HOSPADM

## 2018-09-11 RX ORDER — LIDOCAINE HCL/PF 100 MG/5ML
SYRINGE (ML) INTRAVENOUS
Status: DISCONTINUED | OUTPATIENT
Start: 2018-09-11 | End: 2018-09-11

## 2018-09-11 RX ORDER — ONDANSETRON 2 MG/ML
INJECTION INTRAMUSCULAR; INTRAVENOUS
Status: DISCONTINUED | OUTPATIENT
Start: 2018-09-11 | End: 2018-09-11

## 2018-09-11 RX ORDER — ACETAMINOPHEN 10 MG/ML
INJECTION, SOLUTION INTRAVENOUS
Status: COMPLETED
Start: 2018-09-11 | End: 2018-09-11

## 2018-09-11 RX ORDER — ACETAMINOPHEN 10 MG/ML
1000 INJECTION, SOLUTION INTRAVENOUS EVERY 6 HOURS
Status: COMPLETED | OUTPATIENT
Start: 2018-09-11 | End: 2018-09-12

## 2018-09-11 RX ORDER — HYDROMORPHONE HYDROCHLORIDE 1 MG/ML
0.2 INJECTION, SOLUTION INTRAMUSCULAR; INTRAVENOUS; SUBCUTANEOUS EVERY 5 MIN PRN
Status: COMPLETED | OUTPATIENT
Start: 2018-09-11 | End: 2018-09-11

## 2018-09-11 RX ORDER — NEOSTIGMINE METHYLSULFATE 1 MG/ML
INJECTION, SOLUTION INTRAVENOUS
Status: DISCONTINUED | OUTPATIENT
Start: 2018-09-11 | End: 2018-09-11

## 2018-09-11 RX ORDER — EPHEDRINE SULFATE 50 MG/ML
INJECTION, SOLUTION INTRAVENOUS
Status: DISCONTINUED | OUTPATIENT
Start: 2018-09-11 | End: 2018-09-11

## 2018-09-11 RX ORDER — ROCURONIUM BROMIDE 10 MG/ML
INJECTION, SOLUTION INTRAVENOUS
Status: DISCONTINUED | OUTPATIENT
Start: 2018-09-11 | End: 2018-09-11

## 2018-09-11 RX ORDER — SODIUM CHLORIDE 9 MG/ML
INJECTION, SOLUTION INTRAVENOUS CONTINUOUS
Status: DISCONTINUED | OUTPATIENT
Start: 2018-09-11 | End: 2018-09-11

## 2018-09-11 RX ORDER — BUPIVACAINE HYDROCHLORIDE 5 MG/ML
INJECTION, SOLUTION EPIDURAL; INTRACAUDAL
Status: DISCONTINUED | OUTPATIENT
Start: 2018-09-11 | End: 2018-09-11 | Stop reason: HOSPADM

## 2018-09-11 RX ORDER — RAMELTEON 8 MG/1
8 TABLET ORAL NIGHTLY PRN
Status: DISCONTINUED | OUTPATIENT
Start: 2018-09-11 | End: 2018-09-13 | Stop reason: HOSPADM

## 2018-09-11 RX ORDER — MIDAZOLAM HYDROCHLORIDE 1 MG/ML
INJECTION, SOLUTION INTRAMUSCULAR; INTRAVENOUS
Status: DISCONTINUED | OUTPATIENT
Start: 2018-09-11 | End: 2018-09-11

## 2018-09-11 RX ORDER — CEFAZOLIN SODIUM 1 G/3ML
2 INJECTION, POWDER, FOR SOLUTION INTRAMUSCULAR; INTRAVENOUS
Status: COMPLETED | OUTPATIENT
Start: 2018-09-11 | End: 2018-09-11

## 2018-09-11 RX ORDER — DEXTROSE MONOHYDRATE, SODIUM CHLORIDE, AND POTASSIUM CHLORIDE 50; 1.49; 9 G/1000ML; G/1000ML; G/1000ML
INJECTION, SOLUTION INTRAVENOUS
Status: DISPENSED
Start: 2018-09-11 | End: 2018-09-12

## 2018-09-11 RX ORDER — ONDANSETRON 2 MG/ML
4 INJECTION INTRAMUSCULAR; INTRAVENOUS DAILY PRN
Status: DISCONTINUED | OUTPATIENT
Start: 2018-09-11 | End: 2018-09-11 | Stop reason: HOSPADM

## 2018-09-11 RX ORDER — ONDANSETRON 2 MG/ML
4 INJECTION INTRAMUSCULAR; INTRAVENOUS EVERY 8 HOURS PRN
Status: DISCONTINUED | OUTPATIENT
Start: 2018-09-11 | End: 2018-09-13 | Stop reason: HOSPADM

## 2018-09-11 RX ORDER — FENTANYL CITRATE 50 UG/ML
25 INJECTION, SOLUTION INTRAMUSCULAR; INTRAVENOUS EVERY 5 MIN PRN
Status: COMPLETED | OUTPATIENT
Start: 2018-09-11 | End: 2018-09-11

## 2018-09-11 RX ORDER — HYDROMORPHONE HCL IN 0.9% NACL 6 MG/30 ML
PATIENT CONTROLLED ANALGESIA SYRINGE INTRAVENOUS
Status: COMPLETED
Start: 2018-09-11 | End: 2018-09-11

## 2018-09-11 RX ORDER — FENTANYL CITRATE 50 UG/ML
INJECTION, SOLUTION INTRAMUSCULAR; INTRAVENOUS
Status: COMPLETED
Start: 2018-09-11 | End: 2018-09-11

## 2018-09-11 RX ORDER — SODIUM CHLORIDE 0.9 % (FLUSH) 0.9 %
3 SYRINGE (ML) INJECTION
Status: DISCONTINUED | OUTPATIENT
Start: 2018-09-11 | End: 2018-09-11 | Stop reason: HOSPADM

## 2018-09-11 RX ORDER — HYDROMORPHONE HYDROCHLORIDE 1 MG/ML
1 INJECTION, SOLUTION INTRAMUSCULAR; INTRAVENOUS; SUBCUTANEOUS
Status: DISCONTINUED | OUTPATIENT
Start: 2018-09-11 | End: 2018-09-12 | Stop reason: SDUPTHER

## 2018-09-11 RX ORDER — PHENYLEPHRINE HYDROCHLORIDE 10 MG/ML
INJECTION INTRAVENOUS
Status: DISCONTINUED | OUTPATIENT
Start: 2018-09-11 | End: 2018-09-11

## 2018-09-11 RX ORDER — DIPHENHYDRAMINE HYDROCHLORIDE 50 MG/ML
12.5 INJECTION INTRAMUSCULAR; INTRAVENOUS
Status: DISCONTINUED | OUTPATIENT
Start: 2018-09-11 | End: 2018-09-11

## 2018-09-11 RX ORDER — DEXTROSE MONOHYDRATE, SODIUM CHLORIDE, AND POTASSIUM CHLORIDE 50; 1.49; 4.5 G/1000ML; G/1000ML; G/1000ML
INJECTION, SOLUTION INTRAVENOUS
Status: COMPLETED
Start: 2018-09-11 | End: 2018-09-11

## 2018-09-11 RX ORDER — DIPHENHYDRAMINE HYDROCHLORIDE 50 MG/ML
6.25 INJECTION INTRAMUSCULAR; INTRAVENOUS
Status: COMPLETED | OUTPATIENT
Start: 2018-09-11 | End: 2018-09-11

## 2018-09-11 RX ADMIN — ROCURONIUM BROMIDE 10 MG: 10 INJECTION, SOLUTION INTRAVENOUS at 02:09

## 2018-09-11 RX ADMIN — FENTANYL CITRATE 25 MCG: 50 INJECTION INTRAMUSCULAR; INTRAVENOUS at 06:09

## 2018-09-11 RX ADMIN — FENTANYL CITRATE 100 MCG: 50 INJECTION, SOLUTION INTRAMUSCULAR; INTRAVENOUS at 01:09

## 2018-09-11 RX ADMIN — HYDROMORPHONE HYDROCHLORIDE 0.2 MG: 1 INJECTION, SOLUTION INTRAMUSCULAR; INTRAVENOUS; SUBCUTANEOUS at 07:09

## 2018-09-11 RX ADMIN — DEXAMETHASONE SODIUM PHOSPHATE 4 MG: 4 INJECTION, SOLUTION INTRAMUSCULAR; INTRAVENOUS at 01:09

## 2018-09-11 RX ADMIN — MIDAZOLAM HYDROCHLORIDE 2 MG: 1 INJECTION, SOLUTION INTRAMUSCULAR; INTRAVENOUS at 01:09

## 2018-09-11 RX ADMIN — NEOSTIGMINE METHYLSULFATE 4 MG: 1 INJECTION INTRAVENOUS at 03:09

## 2018-09-11 RX ADMIN — DIPHENHYDRAMINE HYDROCHLORIDE 6.25 MG: 50 INJECTION, SOLUTION INTRAMUSCULAR; INTRAVENOUS at 08:09

## 2018-09-11 RX ADMIN — EPHEDRINE SULFATE 10 MG: 50 INJECTION, SOLUTION INTRAMUSCULAR; INTRAVENOUS; SUBCUTANEOUS at 03:09

## 2018-09-11 RX ADMIN — DEXTROSE MONOHYDRATE, SODIUM CHLORIDE, AND POTASSIUM CHLORIDE: 50; 9; 1.49 INJECTION, SOLUTION INTRAVENOUS at 04:09

## 2018-09-11 RX ADMIN — ONDANSETRON 4 MG: 2 INJECTION INTRAMUSCULAR; INTRAVENOUS at 03:09

## 2018-09-11 RX ADMIN — FENTANYL CITRATE 25 MCG: 50 INJECTION, SOLUTION INTRAMUSCULAR; INTRAVENOUS at 03:09

## 2018-09-11 RX ADMIN — ACETAMINOPHEN 1000 MG: 10 INJECTION, SOLUTION INTRAVENOUS at 02:09

## 2018-09-11 RX ADMIN — Medication: at 04:09

## 2018-09-11 RX ADMIN — POTASSIUM CHLORIDE, DEXTROSE MONOHYDRATE AND SODIUM CHLORIDE 1000 ML: 150; 5; 450 INJECTION, SOLUTION INTRAVENOUS at 04:09

## 2018-09-11 RX ADMIN — SODIUM CHLORIDE: 0.9 INJECTION, SOLUTION INTRAVENOUS at 11:09

## 2018-09-11 RX ADMIN — FENTANYL CITRATE 50 MCG: 50 INJECTION, SOLUTION INTRAMUSCULAR; INTRAVENOUS at 03:09

## 2018-09-11 RX ADMIN — GLYCOPYRROLATE 0.4 MG: 0.2 INJECTION, SOLUTION INTRAMUSCULAR; INTRAVENOUS at 03:09

## 2018-09-11 RX ADMIN — PHENYLEPHRINE HYDROCHLORIDE 100 MCG: 10 INJECTION INTRAVENOUS at 03:09

## 2018-09-11 RX ADMIN — LIDOCAINE HYDROCHLORIDE 80 MG: 20 INJECTION, SOLUTION INTRAVENOUS at 01:09

## 2018-09-11 RX ADMIN — CEFAZOLIN 2 G: 330 INJECTION, POWDER, FOR SOLUTION INTRAMUSCULAR; INTRAVENOUS at 01:09

## 2018-09-11 RX ADMIN — Medication: at 11:09

## 2018-09-11 RX ADMIN — DIPHENHYDRAMINE HYDROCHLORIDE 6.25 MG: 50 INJECTION, SOLUTION INTRAMUSCULAR; INTRAVENOUS at 07:09

## 2018-09-11 RX ADMIN — PROPOFOL 200 MG: 10 INJECTION, EMULSION INTRAVENOUS at 01:09

## 2018-09-11 RX ADMIN — ROCURONIUM BROMIDE 10 MG: 10 INJECTION, SOLUTION INTRAVENOUS at 01:09

## 2018-09-11 RX ADMIN — SUCCINYLCHOLINE CHLORIDE 160 MG: 20 INJECTION, SOLUTION INTRAMUSCULAR; INTRAVENOUS at 01:09

## 2018-09-11 RX ADMIN — SODIUM CHLORIDE, SODIUM GLUCONATE, SODIUM ACETATE, POTASSIUM CHLORIDE, MAGNESIUM CHLORIDE, SODIUM PHOSPHATE, DIBASIC, AND POTASSIUM PHOSPHATE: .53; .5; .37; .037; .03; .012; .00082 INJECTION, SOLUTION INTRAVENOUS at 02:09

## 2018-09-11 RX ADMIN — ACETAMINOPHEN 1000 MG: 10 INJECTION, SOLUTION INTRAVENOUS at 06:09

## 2018-09-11 NOTE — TRANSFER OF CARE
"Anesthesia Transfer of Care Note    Patient: Barbara Ortiz    Procedure(s) Performed: Procedure(s) (LRB):  REPAIR,HERNIA,INCISIONAL WITH MESH (N/A)    Patient location: PACU    Anesthesia Type: general    Transport from OR: Transported from OR on 6-10 L/min O2 by face mask with adequate spontaneous ventilation    Post pain: adequate analgesia    Post assessment: no apparent anesthetic complications    Post vital signs: stable    Level of consciousness: sedated    Nausea/Vomiting: no nausea/vomiting    Complications: none    Transfer of care protocol was followed      Last vitals:   Visit Vitals  BP (!) 150/84 (BP Location: Right arm, Patient Position: Lying)   Pulse 80   Temp 36.3 °C (97.3 °F) (Temporal)   Resp 16   Ht 5' 5" (1.651 m)   Wt 98.9 kg (218 lb)   SpO2 100%   Breastfeeding? No   BMI 36.28 kg/m²     "

## 2018-09-11 NOTE — OP NOTE
DATE OF PROCEDURE: 09/11/2018     PREOPERATIVE DIAGNOSIS: Incisional hernia.     POSTOPERATIVE DIAGNOSIS: Incisional hernia.     PROCEDURE PERFORMED: Incisional hernia repair with mesh.     ATTENDING SURGEON: Teo Baker M.D.     HOUSESTAFF SURGEON: Danilo Orellana M.D. (RES)    ANESTHESIA: General endotracheal.     ESTIMATED BLOOD LOSS: 50 mL.     FINDINGS: A 20 cm diameter incisional hernia defect repaired with Ventrilex mesh in underlay fashion.  There were multiple, at least four, small swiss cheese like defects which were closed primarily, and covered with the Ventrilex mesh.    SPECIMENS: None.     DRAINS: Two 19 [place] LEON drains placed over the fascia.     COMPLICATIONS: None.     INDICATIONS: Barbara Ortiz is a 53 y.o.-year-old female referred to my General Surgery Clinic with a symptomatic incisional hernia. Most recent surgery was a colostomy takedown.  She desired repair. We offered a repair with mesh and She agreed to proceed. She did sign informed consent and expressed her understanding of the risks and benefits of surgery.     OPERATIVE PROCEDURE: The patient was identified in preoperative holding, brought back to the Operating Room. She was placed supine on the operating table and padded appropriately. Monitors were applied and a smooth induction of general endotracheal anesthesia. Her abdomen was shaved with electric clippers and prepped and draped in the standard sterile surgical fashion. A timeout was performed and all team members present, agreed this was the correct procedure on the correct patient. We also confirmed administration of appropriate preoperative antibiotics.     A vertical midline skin incision was made with the scalpel. Subcutaneous tissue was divided with Bovie electrocautery and this dissection was carried down to the anterior abdominal wall fascia. Fascia was cleaned off with cautery.  The hernia defect was identified.  Hernia sac was opened.  Omentum was taken down from fascial  edges with bovie electrocautery, until the undersurface of the peritoneum was free.  Multiple adhesions were lysed with the Metzenbaum scissors.  There were a couple smaller defects closed primarily with #1 PDS.  We felt they were too lateral to be included in the defect without giving us trouble with closure.  We did choose a mesh that would cover these defects.    The final defect was measured to be 20 cm.  We decided to repair it with a piece of Ventrio ST mesh. It was briefly soaked in saline and then introduced into the abdomen.  This covered the small defects as well. The mesh was sewn in place with interrupted #1PDS sutures in horizontal mattress fashion.  We checked for gaps between them and found none and then all sutures were tied. The fascia was closed over the mesh with a running #1 PDS suture.     Marcaine was administered.  LEON Drains were placed over the fascia and secured with Nylon sutures.  Several interrupted 3-0 Vicryl suture was used to reapproximate the fat.  Several 3-0 Vicryl deep dermal sutures were placed.  Staples were used to reapproximate the skin.  A sterile dressing was applied. The patient was extubated in the Operating Room and transported to the Recovery Room in stable condition. All sponge, instrument and needle counts were correct at the end of the case.

## 2018-09-12 ENCOUNTER — ANESTHESIA (OUTPATIENT)
Dept: SURGERY | Facility: HOSPITAL | Age: 53
DRG: 355 | End: 2018-09-12
Payer: COMMERCIAL

## 2018-09-12 ENCOUNTER — ANESTHESIA EVENT (OUTPATIENT)
Dept: SURGERY | Facility: HOSPITAL | Age: 53
DRG: 355 | End: 2018-09-12
Payer: COMMERCIAL

## 2018-09-12 LAB
ANION GAP SERPL CALC-SCNC: 8 MMOL/L
BASOPHILS # BLD AUTO: 0.02 K/UL
BASOPHILS NFR BLD: 0.1 %
BUN SERPL-MCNC: 10 MG/DL
CALCIUM SERPL-MCNC: 8.7 MG/DL
CHLORIDE SERPL-SCNC: 103 MMOL/L
CO2 SERPL-SCNC: 22 MMOL/L
CREAT SERPL-MCNC: 0.8 MG/DL
DIFFERENTIAL METHOD: ABNORMAL
EOSINOPHIL # BLD AUTO: 0 K/UL
EOSINOPHIL NFR BLD: 0 %
ERYTHROCYTE [DISTWIDTH] IN BLOOD BY AUTOMATED COUNT: 14.1 %
EST. GFR  (AFRICAN AMERICAN): >60 ML/MIN/1.73 M^2
EST. GFR  (NON AFRICAN AMERICAN): >60 ML/MIN/1.73 M^2
GLUCOSE SERPL-MCNC: 122 MG/DL
HCT VFR BLD AUTO: 34.8 %
HGB BLD-MCNC: 11.6 G/DL
IMM GRANULOCYTES # BLD AUTO: 0.07 K/UL
IMM GRANULOCYTES NFR BLD AUTO: 0.5 %
LYMPHOCYTES # BLD AUTO: 1.3 K/UL
LYMPHOCYTES NFR BLD: 9.1 %
MAGNESIUM SERPL-MCNC: 1.6 MG/DL
MCH RBC QN AUTO: 29.8 PG
MCHC RBC AUTO-ENTMCNC: 33.3 G/DL
MCV RBC AUTO: 90 FL
MONOCYTES # BLD AUTO: 1.2 K/UL
MONOCYTES NFR BLD: 8 %
NEUTROPHILS # BLD AUTO: 12 K/UL
NEUTROPHILS NFR BLD: 82.3 %
NRBC BLD-RTO: 0 /100 WBC
PHOSPHATE SERPL-MCNC: 3.5 MG/DL
PLATELET # BLD AUTO: 237 K/UL
PMV BLD AUTO: 10.2 FL
POTASSIUM SERPL-SCNC: 4.5 MMOL/L
RBC # BLD AUTO: 3.89 M/UL
SODIUM SERPL-SCNC: 133 MMOL/L
WBC # BLD AUTO: 14.61 K/UL

## 2018-09-12 PROCEDURE — 25000003 PHARM REV CODE 250: Performed by: SURGERY

## 2018-09-12 PROCEDURE — 63600175 PHARM REV CODE 636 W HCPCS: Performed by: STUDENT IN AN ORGANIZED HEALTH CARE EDUCATION/TRAINING PROGRAM

## 2018-09-12 PROCEDURE — 12000002 HC ACUTE/MED SURGE SEMI-PRIVATE ROOM

## 2018-09-12 PROCEDURE — 80048 BASIC METABOLIC PNL TOTAL CA: CPT

## 2018-09-12 PROCEDURE — 27000221 HC OXYGEN, UP TO 24 HOURS

## 2018-09-12 PROCEDURE — 25000003 PHARM REV CODE 250: Performed by: STUDENT IN AN ORGANIZED HEALTH CARE EDUCATION/TRAINING PROGRAM

## 2018-09-12 PROCEDURE — 63600175 PHARM REV CODE 636 W HCPCS: Performed by: SURGERY

## 2018-09-12 PROCEDURE — 83735 ASSAY OF MAGNESIUM: CPT

## 2018-09-12 PROCEDURE — A4216 STERILE WATER/SALINE, 10 ML: HCPCS | Performed by: SURGERY

## 2018-09-12 PROCEDURE — 84100 ASSAY OF PHOSPHORUS: CPT

## 2018-09-12 PROCEDURE — 36000 PLACE NEEDLE IN VEIN: CPT | Performed by: STUDENT IN AN ORGANIZED HEALTH CARE EDUCATION/TRAINING PROGRAM

## 2018-09-12 PROCEDURE — 94761 N-INVAS EAR/PLS OXIMETRY MLT: CPT

## 2018-09-12 PROCEDURE — 36415 COLL VENOUS BLD VENIPUNCTURE: CPT

## 2018-09-12 PROCEDURE — 85025 COMPLETE CBC W/AUTO DIFF WBC: CPT

## 2018-09-12 RX ORDER — GABAPENTIN 300 MG/1
300 CAPSULE ORAL 3 TIMES DAILY
Status: DISCONTINUED | OUTPATIENT
Start: 2018-09-12 | End: 2018-09-12

## 2018-09-12 RX ORDER — ENOXAPARIN SODIUM 100 MG/ML
40 INJECTION SUBCUTANEOUS EVERY 24 HOURS
Status: DISCONTINUED | OUTPATIENT
Start: 2018-09-12 | End: 2018-09-13 | Stop reason: HOSPADM

## 2018-09-12 RX ORDER — GABAPENTIN 300 MG/1
600 CAPSULE ORAL 3 TIMES DAILY
Status: DISCONTINUED | OUTPATIENT
Start: 2018-09-12 | End: 2018-09-13 | Stop reason: HOSPADM

## 2018-09-12 RX ORDER — TRAZODONE HYDROCHLORIDE 50 MG/1
50 TABLET ORAL NIGHTLY PRN
Status: DISCONTINUED | OUTPATIENT
Start: 2018-09-12 | End: 2018-09-13 | Stop reason: HOSPADM

## 2018-09-12 RX ORDER — ESCITALOPRAM OXALATE 20 MG/1
20 TABLET ORAL DAILY
Status: DISCONTINUED | OUTPATIENT
Start: 2018-09-12 | End: 2018-09-13 | Stop reason: HOSPADM

## 2018-09-12 RX ORDER — OXYCODONE AND ACETAMINOPHEN 5; 325 MG/1; MG/1
1 TABLET ORAL EVERY 4 HOURS PRN
Status: DISCONTINUED | OUTPATIENT
Start: 2018-09-12 | End: 2018-09-13 | Stop reason: HOSPADM

## 2018-09-12 RX ORDER — HYDROMORPHONE HYDROCHLORIDE 1 MG/ML
1 INJECTION, SOLUTION INTRAMUSCULAR; INTRAVENOUS; SUBCUTANEOUS ONCE
Status: COMPLETED | OUTPATIENT
Start: 2018-09-12 | End: 2018-09-12

## 2018-09-12 RX ORDER — HYDROMORPHONE HYDROCHLORIDE 1 MG/ML
0.5 INJECTION, SOLUTION INTRAMUSCULAR; INTRAVENOUS; SUBCUTANEOUS EVERY 6 HOURS PRN
Status: DISCONTINUED | OUTPATIENT
Start: 2018-09-12 | End: 2018-09-13 | Stop reason: HOSPADM

## 2018-09-12 RX ORDER — ESTRADIOL 0.5 MG/1
1 TABLET ORAL DAILY
Status: DISCONTINUED | OUTPATIENT
Start: 2018-09-12 | End: 2018-09-13 | Stop reason: HOSPADM

## 2018-09-12 RX ORDER — LANOLIN ALCOHOL/MO/W.PET/CERES
800 CREAM (GRAM) TOPICAL ONCE
Status: COMPLETED | OUTPATIENT
Start: 2018-09-12 | End: 2018-09-12

## 2018-09-12 RX ORDER — OXYCODONE AND ACETAMINOPHEN 10; 325 MG/1; MG/1
1 TABLET ORAL EVERY 4 HOURS PRN
Status: DISCONTINUED | OUTPATIENT
Start: 2018-09-12 | End: 2018-09-13 | Stop reason: HOSPADM

## 2018-09-12 RX ADMIN — GABAPENTIN 600 MG: 300 CAPSULE ORAL at 04:09

## 2018-09-12 RX ADMIN — DOCUSATE SODIUM 50 MG: 50 CAPSULE, LIQUID FILLED ORAL at 10:09

## 2018-09-12 RX ADMIN — DOCUSATE SODIUM 50 MG: 50 CAPSULE, LIQUID FILLED ORAL at 08:09

## 2018-09-12 RX ADMIN — ACETAMINOPHEN 1000 MG: 10 INJECTION, SOLUTION INTRAVENOUS at 12:09

## 2018-09-12 RX ADMIN — Medication 3 ML: at 01:09

## 2018-09-12 RX ADMIN — HYDROMORPHONE HYDROCHLORIDE 0.5 MG: 1 INJECTION, SOLUTION INTRAMUSCULAR; INTRAVENOUS; SUBCUTANEOUS at 04:09

## 2018-09-12 RX ADMIN — ESTRADIOL 1 MG: 0.5 TABLET ORAL at 10:09

## 2018-09-12 RX ADMIN — OXYCODONE HYDROCHLORIDE AND ACETAMINOPHEN 1 TABLET: 10; 325 TABLET ORAL at 08:09

## 2018-09-12 RX ADMIN — HYDROMORPHONE HYDROCHLORIDE 0.5 MG: 1 INJECTION, SOLUTION INTRAMUSCULAR; INTRAVENOUS; SUBCUTANEOUS at 10:09

## 2018-09-12 RX ADMIN — ENOXAPARIN SODIUM 40 MG: 100 INJECTION SUBCUTANEOUS at 04:09

## 2018-09-12 RX ADMIN — GABAPENTIN 600 MG: 300 CAPSULE ORAL at 08:09

## 2018-09-12 RX ADMIN — HYDROMORPHONE HYDROCHLORIDE 1 MG: 1 INJECTION, SOLUTION INTRAMUSCULAR; INTRAVENOUS; SUBCUTANEOUS at 01:09

## 2018-09-12 RX ADMIN — DEXTROSE MONOHYDRATE, SODIUM CHLORIDE, AND POTASSIUM CHLORIDE: 50; 9; 1.49 INJECTION, SOLUTION INTRAVENOUS at 12:09

## 2018-09-12 RX ADMIN — ESCITALOPRAM 20 MG: 20 TABLET, FILM COATED ORAL at 10:09

## 2018-09-12 RX ADMIN — GABAPENTIN 300 MG: 300 CAPSULE ORAL at 02:09

## 2018-09-12 RX ADMIN — Medication 800 MG: at 10:09

## 2018-09-12 RX ADMIN — ACETAMINOPHEN 1000 MG: 10 INJECTION, SOLUTION INTRAVENOUS at 06:09

## 2018-09-12 RX ADMIN — HYDROMORPHONE HYDROCHLORIDE 1 MG: 1 INJECTION, SOLUTION INTRAMUSCULAR; INTRAVENOUS; SUBCUTANEOUS at 06:09

## 2018-09-12 RX ADMIN — Medication 3 ML: at 06:09

## 2018-09-12 RX ADMIN — OXYCODONE HYDROCHLORIDE AND ACETAMINOPHEN 1 TABLET: 10; 325 TABLET ORAL at 01:09

## 2018-09-12 NOTE — PROGRESS NOTES
Attempted to call report. Nurse assigned is in another patient's room. Instructed to call back in 10 minutes.

## 2018-09-12 NOTE — ANESTHESIA PROCEDURE NOTES
Peripheral IV Insertion    Diagnosis: I99.8 Other disorder of circulatory system    Patient location during procedure: floor  Procedure start time: 9/12/2018 5:50 AM  Timeout: 9/12/2018 5:50 AM  Procedure end time: 9/12/2018 6:05 AM  Staffing  Resident/CRNA: Gold Arias MD  Performed: resident/CRNA   Preanesthetic Checklist  Completed: patient identified and IV checkedPeripheral IV Insertion  Skin Prep: chlorhexidine gluconate  Local Infiltration: none  Orientation: left  Location: forearm  Catheter Size: 20 G  Catheter placement by Ultrasound guidance. Heme positive aspiration all ports.  Vessel Caliber: medium, patent, compressibility normal  Vascular Doppler:  not doneInsertion Attempts: 2  Assessment  Dressing: secured with tape and tegaderm  Patient: Tolerated well  Line flushed easily.

## 2018-09-12 NOTE — PROGRESS NOTES
Spoke with surgical about patient's uncontrolled pain. Instructed to give patient Dilaudid and Tylenol on top of of PCA Dilaudid.

## 2018-09-12 NOTE — ANESTHESIA POSTPROCEDURE EVALUATION
"Anesthesia Post Evaluation    Patient: Barbara Ortiz    Procedure(s) Performed: Procedure(s) (LRB):  REPAIR,HERNIA,INCISIONAL WITH MESH (N/A)    Final Anesthesia Type: general  Patient location during evaluation: PACU  Patient participation: Yes- Able to Participate  Level of consciousness: awake and alert  Post-procedure vital signs: reviewed and stable  Pain management: adequate  Airway patency: patent  PONV status at discharge: No PONV  Anesthetic complications: no      Cardiovascular status: blood pressure returned to baseline  Respiratory status: unassisted  Hydration status: euvolemic  Follow-up not needed.        Visit Vitals  /65 (Patient Position: Lying)   Pulse 82   Temp 35.8 °C (96.5 °F) (Oral)   Resp 16   Ht 5' 5" (1.651 m)   Wt 98.8 kg (217 lb 13 oz)   SpO2 95%   Breastfeeding? No   BMI 36.25 kg/m²       Pain/Olivier Score: Pain Assessment Performed: Yes (9/12/2018  6:00 AM)  Presence of Pain: denies (9/12/2018  6:00 AM)  Pain Rating Prior to Med Admin: 10 (9/12/2018  8:55 AM)  Pain Rating Post Med Admin: 0 (9/12/2018  6:38 AM)  Olivier Score: 9 (9/11/2018 10:09 PM)        "

## 2018-09-12 NOTE — NURSING TRANSFER
Nursing Transfer Note      9/11/2018     Transfer To: 543A    Transfer via stretcher    Transfer with  O2, PCA Dilaudid, IV Fluid    Transported by PCT    Medicines sent: IV Fluid, PCA Dilaudid    Chart send with patient: Yes    Notified: Sister    Patient reassessed at: 9/11/2018

## 2018-09-12 NOTE — ANESTHESIA RELEASE NOTE
"Anesthesia Release from PACU Note    Patient: Barbara Ortiz    Procedure(s) Performed: Procedure(s) (LRB):  REPAIR,HERNIA,INCISIONAL WITH MESH (N/A)    Anesthesia type: general    Post pain: Adequate analgesia    Post assessment: no apparent anesthetic complications, tolerated procedure well and no evidence of recall    Last Vitals:   Visit Vitals  BP (!) 162/80 (BP Location: Right arm, Patient Position: Lying)   Pulse 97   Temp 36.4 °C (97.5 °F) (Temporal)   Resp 12   Ht 5' 5" (1.651 m)   Wt 98.9 kg (218 lb)   SpO2 96%   Breastfeeding? No   BMI 36.28 kg/m²       Post vital signs: stable    Level of consciousness: awake, alert  and oriented    Nausea/Vomiting: no nausea/no vomiting    Complications: none    Airway Patency: patent    Respiratory: unassisted, spontaneous ventilation, nasal cannula    Cardiovascular: stable, blood pressure at baseline and hypertensive    Hydration: euvolemic  "

## 2018-09-12 NOTE — PLAN OF CARE
Patient lives in a 1 story house w/3STE, w/railing, w/significant other. Patient is independent & agile prior to this admit. No needs determined. Not medically stable for discharge;POD # 1 surgery included [facility] My Health Packet given to patient after informed about it;patient verbalized their understanding.        09/12/18 1046   Discharge Assessment   Assessment Type Discharge Planning Assessment   Confirmed/corrected address and phone number on facesheet? Yes   Assessment information obtained from? Patient;Medical Record   Expected Length of Stay (days) (2+)   Communicated expected length of stay with patient/caregiver no  (Per MD)   Prior to hospitilization cognitive status: Alert/Oriented;No Deficits   Prior to hospitalization functional status: Independent   Current cognitive status: Alert/Oriented;No Deficits   Current Functional Status: Independent;Needs Assistance   Facility Arrived From: (N/A)   Lives With significant other   Able to Return to Prior Arrangements yes   Is patient able to care for self after discharge? Yes   Who are your caregiver(s) and their phone number(s)? (1. Significant other-Tommy. 2. Jessie Allred Sister     [phone]. 3. Aunt Caron Raphael lives nearby-no # given.  Aunt & sister will help her daytime as she needs. )   Patient's perception of discharge disposition home or selfcare   Readmission Within The Last 30 Days no previous admission in last 30 days   Patient currently being followed by outpatient case management? No   Patient currently receives any other outside agency services? No   Equipment Currently Used at Home none  (Has straight cane & crutches that are not in use now.))   Do you have any problems affording any of your prescribed medications? No   Is the patient taking medications as prescribed? yes   Does the patient have transportation home? Yes   Transportation Available car;family or friend will provide   Dialysis Name and Scheduled days (N/A)   Does the  patient receive services at the Coumadin Clinic? No   Discharge Plan A Home with family   Discharge Plan B Home with family   Patient/Family In Agreement With Plan yes

## 2018-09-12 NOTE — PLAN OF CARE
Patient is warm and comfortable. Pain is controlled; denies PONV. Vital signs are stable and within normal limit. Wound site is clean and dry-dressing in place, abdominal binder in place.

## 2018-09-12 NOTE — PROGRESS NOTES
Progress Note  Surgery    Admit Date: 9/11/2018  Attending: Samuel  S/P: Procedure(s) (LRB):  REPAIR,HERNIA,INCISIONAL WITH MESH (N/A)    Post-operative Day: 1 Day Post-Op    Hospital Day: 2    SUBJECTIVE:     No acute events overnight. Patient states pain is controlled, until she lost IV access.  Denies Fever, chills, nausea, vomiting. Tolerating CLD    OBJECTIVE:     Vital Signs (Most Recent)  Temp: 96.5 °F (35.8 °C) (09/12/18 0744)  Pulse: 82 (09/12/18 0744)  Resp: 16 (09/12/18 0744)  BP: 136/65 (09/12/18 0744)  SpO2: 95 % (09/12/18 0744)    Vital Signs Range (Last 24H):  Temp:  [96.5 °F (35.8 °C)-98.3 °F (36.8 °C)]   Pulse:  []   Resp:  [12-20]   BP: ()/(45-97)   SpO2:  [91 %-100 %]     I & O (Last 24H):    Intake/Output Summary (Last 24 hours) at 9/12/2018 0909  Last data filed at 9/12/2018 0624  Gross per 24 hour   Intake 2888.33 ml   Output 85 ml   Net 2803.33 ml       Physical Exam:  Gen: NAD   HEENT: NCAT  CV: RRR, no m/r/g   Pulm: CAB, unlabored, symmetrical   Abd: Soft, attp. No rebound, guarding.  LEON SS  Extremities: no cyanosis or edema, or clubbing  Skin: Skin color, texture, turgor normal. No rashes or lesions    Laboratory:  CBC:   Recent Labs   Lab  09/12/18 0418   WBC  14.61*   RBC  3.89*   HGB  11.6*   HCT  34.8*   PLT  237   MCV  90   MCH  29.8   MCHC  33.3     CMP:   Recent Labs   Lab  09/12/18 0418   GLU  122*   CALCIUM  8.7   NA  133*   K  4.5   CO2  22*   CL  103   BUN  10   CREATININE  0.8     Coagulation: No results for input(s): PT, INR, APTT in the last 168 hours.  Labs within the past 24 hours have been reviewed.    ASSESSMENT/PLAN:     Assessment: 53 year old s/p ventral hernia repair POD 1.    Plan:  Regular diet  Dispo pending pain control on pills only- likely tomorrow  OOB/ ambulate  Lovenox, home meds    Danilo Orellana MD  General Surgery, PGY-5  [phone]

## 2018-09-13 VITALS
TEMPERATURE: 98 F | BODY MASS INDEX: 36.29 KG/M2 | HEIGHT: 65 IN | RESPIRATION RATE: 16 BRPM | DIASTOLIC BLOOD PRESSURE: 66 MMHG | HEART RATE: 97 BPM | SYSTOLIC BLOOD PRESSURE: 120 MMHG | OXYGEN SATURATION: 94 % | WEIGHT: 217.81 LBS

## 2018-09-13 LAB
ANION GAP SERPL CALC-SCNC: 11 MMOL/L
BASOPHILS # BLD AUTO: 0.06 K/UL
BASOPHILS NFR BLD: 0.3 %
BUN SERPL-MCNC: 11 MG/DL
CALCIUM SERPL-MCNC: 8.9 MG/DL
CHLORIDE SERPL-SCNC: 101 MMOL/L
CO2 SERPL-SCNC: 20 MMOL/L
CREAT SERPL-MCNC: 0.9 MG/DL
DIFFERENTIAL METHOD: ABNORMAL
EOSINOPHIL # BLD AUTO: 0.1 K/UL
EOSINOPHIL NFR BLD: 0.3 %
ERYTHROCYTE [DISTWIDTH] IN BLOOD BY AUTOMATED COUNT: 14.4 %
EST. GFR  (AFRICAN AMERICAN): >60 ML/MIN/1.73 M^2
EST. GFR  (NON AFRICAN AMERICAN): >60 ML/MIN/1.73 M^2
GLUCOSE SERPL-MCNC: 130 MG/DL
HCT VFR BLD AUTO: 38.5 %
HGB BLD-MCNC: 12.2 G/DL
IMM GRANULOCYTES # BLD AUTO: 0.12 K/UL
IMM GRANULOCYTES NFR BLD AUTO: 0.7 %
LYMPHOCYTES # BLD AUTO: 1.9 K/UL
LYMPHOCYTES NFR BLD: 10.7 %
MAGNESIUM SERPL-MCNC: 1.7 MG/DL
MCH RBC QN AUTO: 29.3 PG
MCHC RBC AUTO-ENTMCNC: 31.7 G/DL
MCV RBC AUTO: 93 FL
MONOCYTES # BLD AUTO: 1.8 K/UL
MONOCYTES NFR BLD: 9.8 %
NEUTROPHILS # BLD AUTO: 14.2 K/UL
NEUTROPHILS NFR BLD: 78.2 %
NRBC BLD-RTO: 0 /100 WBC
PHOSPHATE SERPL-MCNC: 2.7 MG/DL
PLATELET # BLD AUTO: 222 K/UL
PMV BLD AUTO: 9.9 FL
POTASSIUM SERPL-SCNC: 3.7 MMOL/L
RBC # BLD AUTO: 4.16 M/UL
SODIUM SERPL-SCNC: 132 MMOL/L
WBC # BLD AUTO: 18.2 K/UL

## 2018-09-13 PROCEDURE — 84100 ASSAY OF PHOSPHORUS: CPT

## 2018-09-13 PROCEDURE — 63600175 PHARM REV CODE 636 W HCPCS: Performed by: SURGERY

## 2018-09-13 PROCEDURE — 63600175 PHARM REV CODE 636 W HCPCS: Performed by: STUDENT IN AN ORGANIZED HEALTH CARE EDUCATION/TRAINING PROGRAM

## 2018-09-13 PROCEDURE — 85025 COMPLETE CBC W/AUTO DIFF WBC: CPT

## 2018-09-13 PROCEDURE — 80048 BASIC METABOLIC PNL TOTAL CA: CPT

## 2018-09-13 PROCEDURE — 25000003 PHARM REV CODE 250: Performed by: SURGERY

## 2018-09-13 PROCEDURE — 36415 COLL VENOUS BLD VENIPUNCTURE: CPT

## 2018-09-13 PROCEDURE — A4216 STERILE WATER/SALINE, 10 ML: HCPCS | Performed by: SURGERY

## 2018-09-13 PROCEDURE — 83735 ASSAY OF MAGNESIUM: CPT

## 2018-09-13 PROCEDURE — 25000003 PHARM REV CODE 250: Performed by: STUDENT IN AN ORGANIZED HEALTH CARE EDUCATION/TRAINING PROGRAM

## 2018-09-13 RX ORDER — IBUPROFEN 400 MG/1
800 TABLET ORAL 3 TIMES DAILY
Status: DISCONTINUED | OUTPATIENT
Start: 2018-09-13 | End: 2018-09-13 | Stop reason: HOSPADM

## 2018-09-13 RX ORDER — OXYCODONE AND ACETAMINOPHEN 5; 325 MG/1; MG/1
1 TABLET ORAL EVERY 4 HOURS PRN
Qty: 60 TABLET | Refills: 0 | Status: ON HOLD | OUTPATIENT
Start: 2018-09-13 | End: 2019-10-14 | Stop reason: CLARIF

## 2018-09-13 RX ORDER — IBUPROFEN 800 MG/1
800 TABLET ORAL 3 TIMES DAILY
Qty: 90 TABLET | Refills: 1 | Status: ON HOLD | OUTPATIENT
Start: 2018-09-13 | End: 2019-10-14 | Stop reason: CLARIF

## 2018-09-13 RX ORDER — POTASSIUM CHLORIDE 750 MG/1
30 CAPSULE, EXTENDED RELEASE ORAL ONCE
Status: COMPLETED | OUTPATIENT
Start: 2018-09-13 | End: 2018-09-13

## 2018-09-13 RX ORDER — LANOLIN ALCOHOL/MO/W.PET/CERES
800 CREAM (GRAM) TOPICAL ONCE
Status: COMPLETED | OUTPATIENT
Start: 2018-09-13 | End: 2018-09-13

## 2018-09-13 RX ORDER — SODIUM,POTASSIUM PHOSPHATES 280-250MG
2 POWDER IN PACKET (EA) ORAL ONCE
Status: COMPLETED | OUTPATIENT
Start: 2018-09-13 | End: 2018-09-13

## 2018-09-13 RX ADMIN — DOCUSATE SODIUM 50 MG: 50 CAPSULE, LIQUID FILLED ORAL at 10:09

## 2018-09-13 RX ADMIN — GABAPENTIN 600 MG: 300 CAPSULE ORAL at 02:09

## 2018-09-13 RX ADMIN — GABAPENTIN 600 MG: 300 CAPSULE ORAL at 10:09

## 2018-09-13 RX ADMIN — POTASSIUM CHLORIDE 30 MEQ: 750 CAPSULE, EXTENDED RELEASE ORAL at 10:09

## 2018-09-13 RX ADMIN — Medication 3 ML: at 06:09

## 2018-09-13 RX ADMIN — IBUPROFEN 800 MG: 400 TABLET ORAL at 06:09

## 2018-09-13 RX ADMIN — IBUPROFEN 800 MG: 400 TABLET ORAL at 09:09

## 2018-09-13 RX ADMIN — HYDROMORPHONE HYDROCHLORIDE 0.5 MG: 1 INJECTION, SOLUTION INTRAMUSCULAR; INTRAVENOUS; SUBCUTANEOUS at 05:09

## 2018-09-13 RX ADMIN — ESCITALOPRAM 20 MG: 20 TABLET, FILM COATED ORAL at 10:09

## 2018-09-13 RX ADMIN — OXYCODONE HYDROCHLORIDE AND ACETAMINOPHEN 1 TABLET: 10; 325 TABLET ORAL at 01:09

## 2018-09-13 RX ADMIN — POTASSIUM & SODIUM PHOSPHATES POWDER PACK 280-160-250 MG 2 PACKET: 280-160-250 PACK at 10:09

## 2018-09-13 RX ADMIN — ESTRADIOL 1 MG: 0.5 TABLET ORAL at 10:09

## 2018-09-13 RX ADMIN — MAGNESIUM OXIDE TAB 400 MG (241.3 MG ELEMENTAL MG) 800 MG: 400 (241.3 MG) TAB at 10:09

## 2018-09-13 RX ADMIN — ENOXAPARIN SODIUM 40 MG: 100 INJECTION SUBCUTANEOUS at 04:09

## 2018-09-13 RX ADMIN — IBUPROFEN 800 MG: 400 TABLET ORAL at 02:09

## 2018-09-13 RX ADMIN — Medication 3 ML: at 02:09

## 2018-09-13 NOTE — NURSING
Pt in bed resting with family at bedside. Pt denies c/o pain or n/v. D/c orders and prescriptions given to pt. Pt verbalized understanding. Removed PIV and pt tolerated well. Pt waiting for transport to vehicle via w/c.

## 2018-09-13 NOTE — PLAN OF CARE
Problem: Patient Care Overview  Goal: Plan of Care Review  Outcome: Revised  Pt free of falls/trauma/injuries. Pt incisional pain being treated with IVP and PO analgesics. Generalized skin remains CDI; No edema noted. Pt drains remain in place with small amounts of drainage noted.  Pt tolerating plan of care.

## 2018-09-13 NOTE — DISCHARGE SUMMARY
[facility]  General Surgery  Discharge Summary      Patient Name: Barbara Ortiz  MRN: [id number]  Admission Date: 9/11/2018  Hospital Length of Stay: 2 days  Discharge Date and Time:  09/13/2018 6:51 AM  Attending Physician: Teo Baker MD   Discharging Provider: ELO Orellana Jr., MD  Primary Care Provider: Noah Cordero MD     HPI: Ms. Ortiz is a 53 y.o. Female with a PMH of HTN and diverticulitis who presents to the clinic c/o twisting and cramping abdominal pain. She underwent ventral hernia repair and colostomy reversal in April by Dr. Baker. Patient presented to the ER last Wednesday c/o of abdominal pain, was admitted for bowel obstruction, and discharged after 4 days. A large incisional hernia was found upon imaging, and patient was referred here.     Procedure(s) (LRB):  REPAIR,HERNIA,INCISIONAL WITH MESH (N/A)     Hospital Course: Observed for pain control.  No nausea, and tolerated regular diet immediately post procedure.  LEON drains are left in place and she will follow up in Dr. Baker's clinic in one week.    Pending Diagnostic Studies:     Procedure Component Value Units Date/Time    Basic metabolic panel [[id number]] Collected:  09/13/18 0604    Order Status:  Sent Lab Status:  In process Updated:  09/13/18 0624    Specimen:  Blood     Magnesium [[id number]] Collected:  09/13/18 0604    Order Status:  Sent Lab Status:  In process Updated:  09/13/18 0624    Specimen:  Blood     Phosphorus [[id number]] Collected:  09/13/18 0604    Order Status:  Sent Lab Status:  In process Updated:  09/13/18 0624    Specimen:  Blood         Final Active Diagnoses:    Diagnosis Date Noted POA    PRINCIPAL PROBLEM:  Incisional hernia [K43.2] 04/10/2018 Yes      Problems Resolved During this Admission:      Discharged Condition: good    Disposition: Home or Self Care    Follow Up:  Follow-up Information     Teo Baker MD In 1 week.    Specialty:  General Surgery  Contact  information:  [address]  [facility] [address]  [phone]                 Patient Instructions:      Lifting restrictions   Order Comments: Do not lift anything greater than 10-15 lbs for 6 weeks     Notify your health care provider if you experience any of the following:  increased confusion or weakness     Notify your health care provider if you experience any of the following:  persistent dizziness, light-headedness, or visual disturbances     Notify your health care provider if you experience any of the following:  worsening rash     Notify your health care provider if you experience any of the following:  severe persistent headache     Notify your health care provider if you experience any of the following:  difficulty breathing or increased cough     Notify your health care provider if you experience any of the following:  redness, tenderness, or signs of infection (pain, swelling, redness, odor or green/yellow discharge around incision site)     Notify your health care provider if you experience any of the following:  severe uncontrolled pain     Notify your health care provider if you experience any of the following:  persistent nausea and vomiting or diarrhea     Notify your health care provider if you experience any of the following:  temperature >100.4     No dressing needed     Activity as tolerated   Order Comments: May shower.  Only need to dress wound if there is drainage, use gauze and tape.  Change dressing over bulbs if they become saturated, no need to dress if there is no drainage.    Please record LEON output in a journal, once a day.  Keep the drains to suction.     Medications:  Reconciled Home Medications:      Medication List      START taking these medications    ibuprofen 800 MG tablet  Commonly known as:  ADVIL,MOTRIN  Take 1 tablet (800 mg total) by mouth 3 (three) times daily.     oxyCODONE-acetaminophen 5-325 mg per tablet  Commonly known as:  PERCOCET  Take 1 tablet by mouth  every 4 (four) hours as needed.        CHANGE how you take these medications    losartan 100 MG tablet  Commonly known as:  COZAAR  Take 0.5 tablets (50 mg total) by mouth every evening.  What changed:    · how much to take  · when to take this        CONTINUE taking these medications    docusate sodium 50 MG capsule  Commonly known as:  COLACE  Take 1 capsule (50 mg total) by mouth 2 (two) times daily.     escitalopram oxalate 20 MG tablet  Commonly known as:  LEXAPRO  Take 20 mg by mouth.     estradiol 1 MG tablet  Commonly known as:  ESTRACE  Take 1 mg by mouth once daily.     gabapentin 300 MG capsule  Commonly known as:  NEURONTIN  Take 1 capsule (300 mg total) by mouth 3 (three) times daily.     hydrOXYzine pamoate 25 MG Cap  Commonly known as:  VISTARIL  Take 25 mg by mouth every 8 (eight) hours as needed.     ondansetron 8 MG tablet  Commonly known as:  ZOFRAN  Take 8 mg by mouth every 8 (eight) hours as needed for Nausea.     traZODone 50 MG tablet  Commonly known as:  DESYREL  Take 50 mg by mouth nightly as needed.        STOP taking these medications    HYDROcodone-acetaminophen 5-325 mg per tablet  Commonly known as:  ABI Orellana Jr., MD  General Surgery  [facility]

## 2018-09-15 ENCOUNTER — NURSE TRIAGE (OUTPATIENT)
Dept: ADMINISTRATIVE | Facility: CLINIC | Age: 53
End: 2018-09-15

## 2018-09-15 NOTE — TELEPHONE ENCOUNTER
Reason for Disposition   [1] Caller requesting NON-URGENT health information AND [2] PCP's office is the best resource    Protocols used: ST INFORMATION ONLY CALL-A-    Pt calling to find out why she was not prescribed antibiotics after surgery. Pt does have drains, informed pt that may be why. No s/s of infection noted Please call pt to discuss.

## 2018-09-17 ENCOUNTER — TELEPHONE (OUTPATIENT)
Dept: SURGERY | Facility: CLINIC | Age: 53
End: 2018-09-17

## 2018-09-18 ENCOUNTER — TELEPHONE (OUTPATIENT)
Dept: SURGERY | Facility: CLINIC | Age: 53
End: 2018-09-18

## 2018-09-18 NOTE — TELEPHONE ENCOUNTER
----- Message from Mare Reed sent at 9/18/2018  1:46 PM CDT -----  Contact: Pt.Self   Pt. States she would like to speak with nurse in reference to losing cap to draining tube that fell in sink please call Pt. Back @ [phone] Thank You

## 2018-09-18 NOTE — TELEPHONE ENCOUNTER
Pt called to report that the cap from one of her LEON Drains broke off and that it no longer holds suction.  She will attempt to figure out a way to close the end of the bulb or will go to the nearest ER for another bulb.  She will f/u as scheduled on 9/20/18.  She verbalized understanding.

## 2018-09-18 NOTE — TELEPHONE ENCOUNTER
Pt called to report that she has 'tightening and swelling' in both legs below the knee.  It does feel better today, but states that 'it feels funny and tight'.  Per JERRELL Lemons, she should go to the ER closer to her home for evaluation of a DVT after her incisional hernia repair on 9/11/18.  She will contact her PCP for direction and possible US as an outpatient.  She verbalized understanding and is agreeable to this plan of care.

## 2018-09-20 ENCOUNTER — TELEPHONE (OUTPATIENT)
Dept: SURGERY | Facility: CLINIC | Age: 53
End: 2018-09-20

## 2018-09-20 ENCOUNTER — OFFICE VISIT (OUTPATIENT)
Dept: SURGERY | Facility: CLINIC | Age: 53
End: 2018-09-20
Payer: COMMERCIAL

## 2018-09-20 ENCOUNTER — HOSPITAL ENCOUNTER (OUTPATIENT)
Dept: RADIOLOGY | Facility: HOSPITAL | Age: 53
Discharge: HOME | End: 2018-09-20
Attending: PHYSICIAN ASSISTANT
Payer: COMMERCIAL

## 2018-09-20 VITALS
WEIGHT: 215 LBS | HEIGHT: 65 IN | HEART RATE: 84 BPM | BODY MASS INDEX: 35.82 KG/M2 | SYSTOLIC BLOOD PRESSURE: 184 MMHG | DIASTOLIC BLOOD PRESSURE: 90 MMHG

## 2018-09-20 DIAGNOSIS — M79.89 LOCALIZED SWELLING OF LOWER EXTREMITY: ICD-10-CM

## 2018-09-20 DIAGNOSIS — Z87.19 S/P HERNIA REPAIR: ICD-10-CM

## 2018-09-20 DIAGNOSIS — Z98.890 S/P HERNIA REPAIR: ICD-10-CM

## 2018-09-20 DIAGNOSIS — M79.89 LOCALIZED SWELLING OF LOWER EXTREMITY: Primary | ICD-10-CM

## 2018-09-20 PROCEDURE — 93970 EXTREMITY STUDY: CPT | Mod: TC

## 2018-09-20 PROCEDURE — 99024 POSTOP FOLLOW-UP VISIT: CPT | Mod: S$GLB,,, | Performed by: PHYSICIAN ASSISTANT

## 2018-09-20 PROCEDURE — 93970 EXTREMITY STUDY: CPT | Mod: 26,,, | Performed by: RADIOLOGY

## 2018-09-20 PROCEDURE — 99999 PR PBB SHADOW E&M-EST. PATIENT-LVL IV: CPT | Mod: PBBFAC,,, | Performed by: PHYSICIAN ASSISTANT

## 2018-09-20 RX ORDER — SULFAMETHOXAZOLE AND TRIMETHOPRIM 800; 160 MG/1; MG/1
TABLET ORAL
Status: ON HOLD | COMMUNITY
Start: 2018-09-17 | End: 2019-10-14 | Stop reason: CLARIF

## 2018-09-20 RX ORDER — ATENOLOL AND CHLORTHALIDONE TABLET 100; 25 MG/1; MG/1
1 TABLET ORAL
Status: ON HOLD | COMMUNITY
End: 2019-10-14 | Stop reason: CLARIF

## 2018-09-20 RX ORDER — METRONIDAZOLE 500 MG/1
500 TABLET ORAL
Status: ON HOLD | COMMUNITY
Start: 2017-07-06 | End: 2019-10-14 | Stop reason: CLARIF

## 2018-09-20 RX ORDER — CIPROFLOXACIN 500 MG/1
500 TABLET ORAL
COMMUNITY
Start: 2017-07-06 | End: 2018-11-07

## 2018-09-20 RX ORDER — NEOMYCIN SULFATE 500 MG/1
1000 TABLET ORAL
Status: ON HOLD | COMMUNITY
Start: 2017-07-06 | End: 2019-10-14 | Stop reason: CLARIF

## 2018-09-20 RX ORDER — OMEPRAZOLE 40 MG/1
40 CAPSULE, DELAYED RELEASE ORAL
COMMUNITY

## 2018-09-20 RX ORDER — LISINOPRIL 20 MG/1
20 TABLET ORAL
Status: ON HOLD | COMMUNITY
End: 2019-10-14 | Stop reason: CLARIF

## 2018-09-20 RX ORDER — FLUCONAZOLE 150 MG/1
TABLET ORAL
COMMUNITY
Start: 2018-07-26 | End: 2020-12-16

## 2018-09-20 NOTE — PROGRESS NOTES
HPI:  The patient is status post-incisional hernia repair and LEON drain placement  on 9/11/18. She still has some pain. She is eating well. The patient denies nausea, vomiting, fever, sweats or problems with the bowels. She states her drains are putting out 60 and 40mL/day. She also complains of leg swelling and in both legs, which started a few days after surgery. She denies shortness of breath or chest pain.    PHYSICAL EXAM:  Physical Exam   Constitutional: She is oriented to person, place, and time. She appears well-developed and well-nourished. No distress.   HENT:   Head: Normocephalic and atraumatic.   Eyes: EOM are normal. No scleral icterus.   Neck: Normal range of motion. Neck supple.   Cardiovascular: Normal rate and regular rhythm.   Pulmonary/Chest: Effort normal. No respiratory distress.   Abdominal:   Soft, appropriate TTP, non-distended  Incision - staples in place, clean, dry and intact  LEON drains with serosang drainage   Musculoskeletal: Normal range of motion. She exhibits no tenderness.   Swelling to BLE, no TTP, full range of motion of BLE   Neurological: She is alert and oriented to person, place, and time.   Skin: Skin is warm and dry.       A/P  52 yo female s/p incisional hernia  -will obtain BLE US to r/o DVT, will call patient with results  -she will return to clinic on Wednesday for drain and staple removal  -regular diet  -no heavy lifting  -no driving on narcotics  -RTC one week for drain check

## 2018-09-20 NOTE — TELEPHONE ENCOUNTER
Pt notified after JERRELL Lemons's review of lower extremity US:  No DVT noted bilaterally.  She should increase her fluid intake, ambulate as tolerated, and elevated her lower extremities when sitting/lying down.  She verbalized understanding.

## 2018-09-26 ENCOUNTER — OFFICE VISIT (OUTPATIENT)
Dept: SURGERY | Facility: CLINIC | Age: 53
End: 2018-09-26
Payer: COMMERCIAL

## 2018-09-26 VITALS
SYSTOLIC BLOOD PRESSURE: 134 MMHG | WEIGHT: 221.81 LBS | TEMPERATURE: 99 F | HEART RATE: 83 BPM | DIASTOLIC BLOOD PRESSURE: 75 MMHG | HEIGHT: 65 IN | BODY MASS INDEX: 36.96 KG/M2

## 2018-09-26 DIAGNOSIS — Z98.890 S/P REPAIR OF RECURRENT VENTRAL HERNIA: Primary | ICD-10-CM

## 2018-09-26 DIAGNOSIS — Z87.19 S/P REPAIR OF RECURRENT VENTRAL HERNIA: Primary | ICD-10-CM

## 2018-09-26 PROCEDURE — 99024 POSTOP FOLLOW-UP VISIT: CPT | Mod: S$GLB,,, | Performed by: SURGERY

## 2018-09-26 PROCEDURE — 99999 PR PBB SHADOW E&M-EST. PATIENT-LVL IV: CPT | Mod: PBBFAC,,, | Performed by: SURGERY

## 2018-09-26 RX ORDER — TRAMADOL HYDROCHLORIDE 50 MG/1
50 TABLET ORAL EVERY 6 HOURS PRN
Qty: 20 TABLET | Refills: 0 | Status: SHIPPED | OUTPATIENT
Start: 2018-09-26 | End: 2018-10-06

## 2018-09-26 NOTE — PROGRESS NOTES
HPI:  The patient is status post-incisional hernia repair and LEON drain placement  on 9/11/18. She still has some pain. She is eating well. The patient denies nausea, vomiting, fever, sweats or problems with the bowels. She states her drains are putting out <10mL/day.     PHYSICAL EXAM:  Physical Exam   Constitutional: She is oriented to person, place, and time. She appears well-developed and well-nourished. No distress.   HENT:   Head: Normocephalic and atraumatic.   Eyes: EOM are normal. No scleral icterus.   Neck: Normal range of motion. Neck supple.   Cardiovascular: Normal rate and regular rhythm.   Pulmonary/Chest: Effort normal. No respiratory distress.   Abdominal:   Soft, appropriate TTP, non-distended  Incision - staples in place, clean, dry and intact  LEON drains with minimal serosang drainage   Musculoskeletal: Normal range of motion. She exhibits no tenderness.   Swelling to BLE, no TTP, full range of motion of BLE   Neurological: She is alert and oriented to person, place, and time.   Skin: Skin is warm and dry.       A/P  54 yo female s/p incisional hernia  -removed staples and drains in clinic, patient tolerated it well  -refilled tramadol 50mg #20, no refills  -regular diet  -no heavy lifting for 4 more weeks  -no driving on narcotics  -RTC PRN

## 2018-10-23 ENCOUNTER — HOSPITAL ENCOUNTER (INPATIENT)
Facility: HOSPITAL | Age: 53
LOS: 2 days | Discharge: HOME | DRG: 863 | End: 2018-10-25
Attending: SURGERY | Admitting: SURGERY
Payer: COMMERCIAL

## 2018-10-23 DIAGNOSIS — L02.211 ABDOMINAL WALL ABSCESS: ICD-10-CM

## 2018-10-23 PROBLEM — R18.8 INTRAABDOMINAL FLUID COLLECTION: Status: ACTIVE | Noted: 2018-10-23

## 2018-10-23 LAB
ALBUMIN SERPL BCP-MCNC: 3.1 G/DL
ALP SERPL-CCNC: 81 U/L
ALT SERPL W/O P-5'-P-CCNC: 6 U/L
ANION GAP SERPL CALC-SCNC: 8 MMOL/L
AST SERPL-CCNC: 15 U/L
BASOPHILS # BLD AUTO: 0.04 K/UL
BASOPHILS NFR BLD: 0.3 %
BILIRUB SERPL-MCNC: 0.6 MG/DL
BUN SERPL-MCNC: 14 MG/DL
CALCIUM SERPL-MCNC: 10.1 MG/DL
CHLORIDE SERPL-SCNC: 103 MMOL/L
CO2 SERPL-SCNC: 24 MMOL/L
CREAT SERPL-MCNC: 0.8 MG/DL
DIFFERENTIAL METHOD: ABNORMAL
EOSINOPHIL # BLD AUTO: 0.1 K/UL
EOSINOPHIL NFR BLD: 0.9 %
ERYTHROCYTE [DISTWIDTH] IN BLOOD BY AUTOMATED COUNT: 14.4 %
EST. GFR  (AFRICAN AMERICAN): >60 ML/MIN/1.73 M^2
EST. GFR  (NON AFRICAN AMERICAN): >60 ML/MIN/1.73 M^2
GLUCOSE SERPL-MCNC: 117 MG/DL
HCT VFR BLD AUTO: 31.8 %
HGB BLD-MCNC: 9.8 G/DL
IMM GRANULOCYTES # BLD AUTO: 0.07 K/UL
IMM GRANULOCYTES NFR BLD AUTO: 0.6 %
LYMPHOCYTES # BLD AUTO: 1.8 K/UL
LYMPHOCYTES NFR BLD: 14.5 %
MAGNESIUM SERPL-MCNC: 1.8 MG/DL
MCH RBC QN AUTO: 26.3 PG
MCHC RBC AUTO-ENTMCNC: 30.8 G/DL
MCV RBC AUTO: 85 FL
MONOCYTES # BLD AUTO: 0.7 K/UL
MONOCYTES NFR BLD: 5.5 %
NEUTROPHILS # BLD AUTO: 9.7 K/UL
NEUTROPHILS NFR BLD: 78.2 %
NRBC BLD-RTO: 0 /100 WBC
PHOSPHATE SERPL-MCNC: 4.1 MG/DL
PLATELET # BLD AUTO: 373 K/UL
PMV BLD AUTO: 9.3 FL
POTASSIUM SERPL-SCNC: 4 MMOL/L
PROT SERPL-MCNC: 9.2 G/DL
RBC # BLD AUTO: 3.73 M/UL
SODIUM SERPL-SCNC: 135 MMOL/L
WBC # BLD AUTO: 12.34 K/UL

## 2018-10-23 PROCEDURE — 85025 COMPLETE CBC W/AUTO DIFF WBC: CPT

## 2018-10-23 PROCEDURE — 63600175 PHARM REV CODE 636 W HCPCS: Performed by: STUDENT IN AN ORGANIZED HEALTH CARE EDUCATION/TRAINING PROGRAM

## 2018-10-23 PROCEDURE — 36415 COLL VENOUS BLD VENIPUNCTURE: CPT

## 2018-10-23 PROCEDURE — 83735 ASSAY OF MAGNESIUM: CPT

## 2018-10-23 PROCEDURE — 25000003 PHARM REV CODE 250: Performed by: STUDENT IN AN ORGANIZED HEALTH CARE EDUCATION/TRAINING PROGRAM

## 2018-10-23 PROCEDURE — 80053 COMPREHEN METABOLIC PANEL: CPT

## 2018-10-23 PROCEDURE — 11000001 HC ACUTE MED/SURG PRIVATE ROOM

## 2018-10-23 PROCEDURE — 84100 ASSAY OF PHOSPHORUS: CPT

## 2018-10-23 PROCEDURE — 87040 BLOOD CULTURE FOR BACTERIA: CPT

## 2018-10-23 RX ORDER — SODIUM CHLORIDE 0.9 % (FLUSH) 0.9 %
3 SYRINGE (ML) INJECTION
Status: DISCONTINUED | OUTPATIENT
Start: 2018-10-23 | End: 2018-10-25 | Stop reason: HOSPADM

## 2018-10-23 RX ORDER — OXYCODONE HYDROCHLORIDE 5 MG/1
5 TABLET ORAL EVERY 4 HOURS PRN
Status: DISCONTINUED | OUTPATIENT
Start: 2018-10-23 | End: 2018-10-25 | Stop reason: HOSPADM

## 2018-10-23 RX ORDER — METRONIDAZOLE 500 MG/100ML
500 INJECTION, SOLUTION INTRAVENOUS
Status: DISCONTINUED | OUTPATIENT
Start: 2018-10-23 | End: 2018-10-23

## 2018-10-23 RX ORDER — HEPARIN SODIUM 5000 [USP'U]/ML
5000 INJECTION, SOLUTION INTRAVENOUS; SUBCUTANEOUS EVERY 8 HOURS
Status: DISCONTINUED | OUTPATIENT
Start: 2018-10-23 | End: 2018-10-25 | Stop reason: HOSPADM

## 2018-10-23 RX ORDER — SODIUM CHLORIDE, SODIUM LACTATE, POTASSIUM CHLORIDE, CALCIUM CHLORIDE 600; 310; 30; 20 MG/100ML; MG/100ML; MG/100ML; MG/100ML
INJECTION, SOLUTION INTRAVENOUS CONTINUOUS
Status: DISCONTINUED | OUTPATIENT
Start: 2018-10-23 | End: 2018-10-25

## 2018-10-23 RX ORDER — LOSARTAN POTASSIUM 50 MG/1
50 TABLET ORAL NIGHTLY
Status: DISCONTINUED | OUTPATIENT
Start: 2018-10-23 | End: 2018-10-25

## 2018-10-23 RX ORDER — VANCOMYCIN HCL IN 5 % DEXTROSE 1.5G/250ML
1500 PLASTIC BAG, INJECTION (ML) INTRAVENOUS
Status: DISCONTINUED | OUTPATIENT
Start: 2018-10-24 | End: 2018-10-25

## 2018-10-23 RX ORDER — LIDOCAINE HYDROCHLORIDE 10 MG/ML
1 INJECTION, SOLUTION EPIDURAL; INFILTRATION; INTRACAUDAL; PERINEURAL ONCE
Status: DISCONTINUED | OUTPATIENT
Start: 2018-10-23 | End: 2018-10-25 | Stop reason: HOSPADM

## 2018-10-23 RX ORDER — ONDANSETRON 8 MG/1
8 TABLET, ORALLY DISINTEGRATING ORAL EVERY 8 HOURS PRN
Status: DISCONTINUED | OUTPATIENT
Start: 2018-10-23 | End: 2018-10-25 | Stop reason: HOSPADM

## 2018-10-23 RX ORDER — OXYCODONE HYDROCHLORIDE 10 MG/1
10 TABLET ORAL EVERY 4 HOURS PRN
Status: DISCONTINUED | OUTPATIENT
Start: 2018-10-23 | End: 2018-10-25 | Stop reason: HOSPADM

## 2018-10-23 RX ORDER — ESTRADIOL 1 MG/1
1 TABLET ORAL DAILY
Status: DISCONTINUED | OUTPATIENT
Start: 2018-10-24 | End: 2018-10-25 | Stop reason: HOSPADM

## 2018-10-23 RX ORDER — ESCITALOPRAM OXALATE 20 MG/1
20 TABLET ORAL DAILY
Status: DISCONTINUED | OUTPATIENT
Start: 2018-10-24 | End: 2018-10-25 | Stop reason: HOSPADM

## 2018-10-23 RX ORDER — CIPROFLOXACIN 2 MG/ML
400 INJECTION, SOLUTION INTRAVENOUS
Status: DISCONTINUED | OUTPATIENT
Start: 2018-10-23 | End: 2018-10-23

## 2018-10-23 RX ORDER — CEFEPIME HYDROCHLORIDE 2 G/1
2 INJECTION, POWDER, FOR SOLUTION INTRAVENOUS
Status: DISCONTINUED | OUTPATIENT
Start: 2018-10-23 | End: 2018-10-25

## 2018-10-23 RX ORDER — GABAPENTIN 300 MG/1
300 CAPSULE ORAL 3 TIMES DAILY
Status: DISCONTINUED | OUTPATIENT
Start: 2018-10-23 | End: 2018-10-25 | Stop reason: HOSPADM

## 2018-10-23 RX ORDER — VANCOMYCIN HCL IN 5 % DEXTROSE 1.5G/250ML
15 PLASTIC BAG, INJECTION (ML) INTRAVENOUS
Status: DISCONTINUED | OUTPATIENT
Start: 2018-10-23 | End: 2018-10-23

## 2018-10-23 RX ADMIN — SODIUM CHLORIDE, SODIUM LACTATE, POTASSIUM CHLORIDE, AND CALCIUM CHLORIDE: .6; .31; .03; .02 INJECTION, SOLUTION INTRAVENOUS at 08:10

## 2018-10-23 RX ADMIN — VANCOMYCIN HYDROCHLORIDE 2500 MG: 1 INJECTION, POWDER, LYOPHILIZED, FOR SOLUTION INTRAVENOUS at 08:10

## 2018-10-23 RX ADMIN — OXYCODONE HYDROCHLORIDE 5 MG: 5 TABLET ORAL at 08:10

## 2018-10-23 RX ADMIN — HEPARIN SODIUM 5000 UNITS: 5000 INJECTION, SOLUTION INTRAVENOUS; SUBCUTANEOUS at 10:10

## 2018-10-23 RX ADMIN — CEFEPIME 2 G: 2 INJECTION, POWDER, FOR SOLUTION INTRAVENOUS at 11:10

## 2018-10-23 RX ADMIN — GABAPENTIN 300 MG: 300 CAPSULE ORAL at 08:10

## 2018-10-23 RX ADMIN — LOSARTAN POTASSIUM 50 MG: 50 TABLET ORAL at 09:10

## 2018-10-23 NOTE — HPI
53-year-old female with history of diverticulitis s/p resection followed by colostomy reversal 04/2018 as well as incisional hernia repair with mesh 09/2018 presenting today with 4-5 days of moderate-severe stabbing right-sided abdominal pain that seems to wax and wane. Associated symptoms include nausea, bloating, and a small area of fluctuance over her right-sided abdomen that opened and drained last night. Unsure if drainage was clear of pus. She initially presented to [facility] in [place] and was transferred here after CT showed [id number] intra-abdominal fluid and air collection posterior to rectus but anterior to mesh. Last BM this morning. Denies fevers, chills, vomiting, or any other complaints at this time.

## 2018-10-23 NOTE — SUBJECTIVE & OBJECTIVE
Medications Prior to Admission   Medication Sig Dispense Refill Last Dose    atenolol-chlorthalidone (TENORETIC) 100-25 mg per tablet Take 1 tablet by mouth.   Taking    ciprofloxacin HCl (CIPRO) 500 MG tablet Take 500 mg by mouth.   Taking    docusate sodium (COLACE) 50 MG capsule Take 1 capsule (50 mg total) by mouth 2 (two) times daily.  0 Taking    escitalopram oxalate (LEXAPRO) 20 MG tablet Take 20 mg by mouth.   Taking    estradiol (ESTRACE) 1 MG tablet Take 1 mg by mouth once daily.    Taking    fluconazole (DIFLUCAN) 150 MG Tab    Taking    gabapentin (NEURONTIN) 300 MG capsule Take 1 capsule (300 mg total) by mouth 3 (three) times daily. 90 capsule 11 Taking    hydrOXYzine pamoate (VISTARIL) 25 MG Cap Take 25 mg by mouth every 8 (eight) hours as needed.    Taking    ibuprofen (ADVIL,MOTRIN) 800 MG tablet Take 1 tablet (800 mg total) by mouth 3 (three) times daily. 90 tablet 1 Taking    lisinopril (PRINIVIL,ZESTRIL) 20 MG tablet Take 20 mg by mouth.   Taking    losartan (COZAAR) 100 MG tablet Take 0.5 tablets (50 mg total) by mouth every evening. (Patient taking differently: Take 100 mg by mouth once daily. ) 15 tablet 0 Taking    metroNIDAZOLE (FLAGYL) 500 MG tablet Take 500 mg by mouth.   Taking    neomycin (MYCIFRADIN) 500 mg Tab Take 1,000 mg by mouth.   Taking    omeprazole (PRILOSEC) 40 MG capsule Take 40 mg by mouth.   Taking    ondansetron (ZOFRAN) 8 MG tablet Take 8 mg by mouth every 8 (eight) hours as needed for Nausea.   Taking    oxyCODONE-acetaminophen (PERCOCET) 5-325 mg per tablet Take 1 tablet by mouth every 4 (four) hours as needed. 60 tablet 0 Taking    sulfamethoxazole-trimethoprim 800-160mg (BACTRIM DS) 800-160 mg Tab    Taking    traZODone (DESYREL) 50 MG tablet Take 50 mg by mouth nightly as needed.    Taking       Review of patient's allergies indicates:  No Known Allergies    Past Medical History:   Diagnosis Date    Anxiety     Diverticulitis     Fibromyalgia      Hypertension     Sleep apnea      Past Surgical History:   Procedure Laterality Date    CARPAL TUNNEL RELEASE Right     CHOLECYSTECTOMY      COLOSTOMY      COLOSTOMY CLOSURE  04/10/2018    COLOSTOMY-REVERSAL N/A 4/10/2018    Performed by [facility] at [facility] OR [address] [place] [address]    HERNIA REPAIR  04/10/2018    LUMBAR FUSION      PARTIAL HYSTERECTOMY  2002    REPAIR,HERNIA,INCISIONAL WITH MESH N/A 9/11/2018    Performed by [facility]o Baker MD at [facility] OR [address] [place] [address]    REPAIR-HERNIA-INCISIONAL-INITIAL Open N/A 4/10/2018    Performed by Teo Baker MD at [facility] OR [address] [place] [address]     Family History     None        Tobacco Use    Smoking status: Never Smoker    Smokeless tobacco: Never Used   Substance and Sexual Activity    Alcohol use: Yes     Comment: occasionally    Drug use: No    Sexual activity: Not on file     Review of Systems   Constitutional: Negative for chills and fever.   HENT: Negative for sore throat.    Eyes: Negative for visual disturbance.   Respiratory: Negative for shortness of breath.    Cardiovascular: Negative for chest pain.   Gastrointestinal: Positive for abdominal distention, abdominal pain and nausea.   Genitourinary: Negative for dysuria.   Musculoskeletal: Negative for back pain.   Skin:        Fluid collection over right-sided abdomen.    Neurological: Negative for headaches.   Psychiatric/Behavioral: Negative for agitation.     Objective:     Vital Signs (Most Recent):  Temp: 96.7 °F (35.9 °C) (10/23/18 1732)  Pulse: 66 (10/23/18 1732)  Resp: 18 (10/23/18 1732)  BP: (!) 107/55 (10/23/18 1732)  SpO2: 100 % (10/23/18 1732) Vital Signs (24h Range):  Temp:  [96.7 °F (35.9 °C)-99 °F (37.2 °C)] 96.7 °F (35.9 °C)  Pulse:  [66-75] 66  Resp:  [14-18] 18  SpO2:  [97 %-100 %] 100 %  BP: (107-119)/(55-70) 107/55     Weight: 95.5 kg (210 lb 8.6 oz)  Body mass index is 35.04 kg/m².    Physical Exam   Constitutional: She appears well-developed and well-nourished. No distress.    HENT:   Head: Normocephalic and atraumatic.   Eyes: EOM are normal.   Neck: Normal range of motion.   Cardiovascular: Normal rate and regular rhythm.   No murmur heard.  Pulmonary/Chest: Effort normal and breath sounds normal. No respiratory distress. She has no wheezes. She has no rales.   Abdominal: Soft. There is tenderness (mild right-sided tenderness, mostly in upper quadrant). There is no guarding.   Midline laparotomy incision well-healed. Small 2x2cm area of skin breakdown with dry serosanguinous fluid overlying with no obvious fluctuation. Non-draining. No surrounding erythema.    Musculoskeletal: Normal range of motion.   Neurological: She is alert.   Skin: Skin is warm and dry. No erythema.   Psychiatric: She has a normal mood and affect. Her behavior is normal.   Nursing note and vitals reviewed.      Significant Labs:  pending    Significant Diagnostics:  CT CD in chart. Being uploaded. Report reviewed.     [id number] fluid collection with air pockets between rectus and mesh. Small fluid collections with stranding of subcutaneous fat of the abdomen and pelvis with skin thickening with overlying cellulitis.

## 2018-10-24 LAB
ALBUMIN SERPL BCP-MCNC: 2.9 G/DL
ALP SERPL-CCNC: 85 U/L
ALT SERPL W/O P-5'-P-CCNC: 6 U/L
ANION GAP SERPL CALC-SCNC: 8 MMOL/L
AST SERPL-CCNC: 13 U/L
BASOPHILS # BLD AUTO: 0.05 K/UL
BASOPHILS NFR BLD: 0.5 %
BILIRUB SERPL-MCNC: 0.7 MG/DL
BUN SERPL-MCNC: 13 MG/DL
CALCIUM SERPL-MCNC: 9.6 MG/DL
CHLORIDE SERPL-SCNC: 101 MMOL/L
CO2 SERPL-SCNC: 26 MMOL/L
CREAT SERPL-MCNC: 0.9 MG/DL
DIFFERENTIAL METHOD: ABNORMAL
EOSINOPHIL # BLD AUTO: 0.3 K/UL
EOSINOPHIL NFR BLD: 2.8 %
ERYTHROCYTE [DISTWIDTH] IN BLOOD BY AUTOMATED COUNT: 14.4 %
EST. GFR  (AFRICAN AMERICAN): >60 ML/MIN/1.73 M^2
EST. GFR  (NON AFRICAN AMERICAN): >60 ML/MIN/1.73 M^2
GLUCOSE SERPL-MCNC: 98 MG/DL
GRAM STN SPEC: NORMAL
GRAM STN SPEC: NORMAL
HCT VFR BLD AUTO: 31.1 %
HGB BLD-MCNC: 9.6 G/DL
IMM GRANULOCYTES # BLD AUTO: 0.03 K/UL
IMM GRANULOCYTES NFR BLD AUTO: 0.3 %
LYMPHOCYTES # BLD AUTO: 2.5 K/UL
LYMPHOCYTES NFR BLD: 22.7 %
MAGNESIUM SERPL-MCNC: 1.9 MG/DL
MCH RBC QN AUTO: 26.7 PG
MCHC RBC AUTO-ENTMCNC: 30.9 G/DL
MCV RBC AUTO: 87 FL
MONOCYTES # BLD AUTO: 0.9 K/UL
MONOCYTES NFR BLD: 8 %
NEUTROPHILS # BLD AUTO: 7.2 K/UL
NEUTROPHILS NFR BLD: 65.7 %
NRBC BLD-RTO: 0 /100 WBC
PHOSPHATE SERPL-MCNC: 4.5 MG/DL
PLATELET # BLD AUTO: 366 K/UL
PMV BLD AUTO: 9.4 FL
POTASSIUM SERPL-SCNC: 4.3 MMOL/L
PROT SERPL-MCNC: 8.7 G/DL
RBC # BLD AUTO: 3.59 M/UL
SODIUM SERPL-SCNC: 135 MMOL/L
WBC # BLD AUTO: 11.01 K/UL

## 2018-10-24 PROCEDURE — 63600175 PHARM REV CODE 636 W HCPCS: Performed by: STUDENT IN AN ORGANIZED HEALTH CARE EDUCATION/TRAINING PROGRAM

## 2018-10-24 PROCEDURE — 84100 ASSAY OF PHOSPHORUS: CPT

## 2018-10-24 PROCEDURE — 87075 CULTR BACTERIA EXCEPT BLOOD: CPT

## 2018-10-24 PROCEDURE — 80053 COMPREHEN METABOLIC PANEL: CPT

## 2018-10-24 PROCEDURE — 87186 SC STD MICRODIL/AGAR DIL: CPT

## 2018-10-24 PROCEDURE — 36415 COLL VENOUS BLD VENIPUNCTURE: CPT

## 2018-10-24 PROCEDURE — 11000001 HC ACUTE MED/SURG PRIVATE ROOM

## 2018-10-24 PROCEDURE — 0W9F30Z DRAINAGE OF ABDOMINAL WALL WITH DRAINAGE DEVICE, PERCUTANEOUS APPROACH: ICD-10-PCS | Performed by: RADIOLOGY

## 2018-10-24 PROCEDURE — 87070 CULTURE OTHR SPECIMN AEROBIC: CPT

## 2018-10-24 PROCEDURE — 87205 SMEAR GRAM STAIN: CPT

## 2018-10-24 PROCEDURE — 85025 COMPLETE CBC W/AUTO DIFF WBC: CPT

## 2018-10-24 PROCEDURE — 87077 CULTURE AEROBIC IDENTIFY: CPT

## 2018-10-24 PROCEDURE — 25000003 PHARM REV CODE 250: Performed by: STUDENT IN AN ORGANIZED HEALTH CARE EDUCATION/TRAINING PROGRAM

## 2018-10-24 PROCEDURE — 83735 ASSAY OF MAGNESIUM: CPT

## 2018-10-24 PROCEDURE — 63600175 PHARM REV CODE 636 W HCPCS: Performed by: RADIOLOGY

## 2018-10-24 RX ORDER — HYDROMORPHONE HYDROCHLORIDE 1 MG/ML
1 INJECTION, SOLUTION INTRAMUSCULAR; INTRAVENOUS; SUBCUTANEOUS ONCE
Status: COMPLETED | OUTPATIENT
Start: 2018-10-24 | End: 2018-10-25

## 2018-10-24 RX ORDER — DIPHENHYDRAMINE HCL 25 MG
25 CAPSULE ORAL EVERY 6 HOURS PRN
Status: DISCONTINUED | OUTPATIENT
Start: 2018-10-25 | End: 2018-10-25 | Stop reason: HOSPADM

## 2018-10-24 RX ORDER — MIDAZOLAM HYDROCHLORIDE 1 MG/ML
INJECTION INTRAMUSCULAR; INTRAVENOUS CODE/TRAUMA/SEDATION MEDICATION
Status: COMPLETED | OUTPATIENT
Start: 2018-10-24 | End: 2018-10-24

## 2018-10-24 RX ADMIN — LOSARTAN POTASSIUM 50 MG: 50 TABLET ORAL at 09:10

## 2018-10-24 RX ADMIN — OXYCODONE HYDROCHLORIDE 10 MG: 10 TABLET ORAL at 12:10

## 2018-10-24 RX ADMIN — GABAPENTIN 300 MG: 300 CAPSULE ORAL at 03:10

## 2018-10-24 RX ADMIN — SODIUM CHLORIDE, SODIUM LACTATE, POTASSIUM CHLORIDE, AND CALCIUM CHLORIDE: .6; .31; .03; .02 INJECTION, SOLUTION INTRAVENOUS at 08:10

## 2018-10-24 RX ADMIN — ESTRADIOL 1 MG: 1 TABLET ORAL at 09:10

## 2018-10-24 RX ADMIN — VANCOMYCIN HYDROCHLORIDE 1500 MG: 10 INJECTION, POWDER, LYOPHILIZED, FOR SOLUTION INTRAVENOUS at 08:10

## 2018-10-24 RX ADMIN — GABAPENTIN 300 MG: 300 CAPSULE ORAL at 09:10

## 2018-10-24 RX ADMIN — HEPARIN SODIUM 5000 UNITS: 5000 INJECTION, SOLUTION INTRAVENOUS; SUBCUTANEOUS at 03:10

## 2018-10-24 RX ADMIN — VANCOMYCIN HYDROCHLORIDE 1500 MG: 10 INJECTION, POWDER, LYOPHILIZED, FOR SOLUTION INTRAVENOUS at 09:10

## 2018-10-24 RX ADMIN — CEFEPIME 2 G: 2 INJECTION, POWDER, FOR SOLUTION INTRAVENOUS at 03:10

## 2018-10-24 RX ADMIN — HEPARIN SODIUM 5000 UNITS: 5000 INJECTION, SOLUTION INTRAVENOUS; SUBCUTANEOUS at 09:10

## 2018-10-24 RX ADMIN — OXYCODONE HYDROCHLORIDE 10 MG: 10 TABLET ORAL at 03:10

## 2018-10-24 RX ADMIN — HEPARIN SODIUM 5000 UNITS: 5000 INJECTION, SOLUTION INTRAVENOUS; SUBCUTANEOUS at 06:10

## 2018-10-24 RX ADMIN — CEFEPIME 2 G: 2 INJECTION, POWDER, FOR SOLUTION INTRAVENOUS at 06:10

## 2018-10-24 RX ADMIN — OXYCODONE HYDROCHLORIDE 10 MG: 10 TABLET ORAL at 09:10

## 2018-10-24 RX ADMIN — CEFEPIME 2 G: 2 INJECTION, POWDER, FOR SOLUTION INTRAVENOUS at 11:10

## 2018-10-24 RX ADMIN — OXYCODONE HYDROCHLORIDE 10 MG: 10 TABLET ORAL at 11:10

## 2018-10-24 RX ADMIN — MIDAZOLAM HYDROCHLORIDE 2 MG: 1 INJECTION, SOLUTION INTRAMUSCULAR; INTRAVENOUS at 05:10

## 2018-10-24 RX ADMIN — ESCITALOPRAM 20 MG: 20 TABLET, FILM COATED ORAL at 09:10

## 2018-10-24 NOTE — PROGRESS NOTES
10F drain placed to midline abdomen, to bulb suction.  700ml of cloudy yellow fluid removed, specimen to lab per order.  Pt tolerates well.

## 2018-10-24 NOTE — H&P
[facility]  Vascular Surgery  History and Physical     Patient Name: Barbara Ortiz  MRN: [id number]  Admission Date: 10/23/2018  Code Status: Full Code   Attending Physician: Samuel  Primary Care Physician: Noah Cordero MD    Subjective:     Chief Complaint/Reason for Admission: abdominal pain     HPI:  53-year-old female with history of diverticulitis s/p resection followed by colostomy reversal 04/2018 as well as incisional hernia repair with mesh 09/2018 presenting today with 4-5 days of moderate-severe stabbing right-sided abdominal pain that seems to wax and wane. Associated symptoms include nausea, bloating, and a small area of fluctuance over her right-sided abdomen that opened and drained last night. Unsure if drainage was clear of pus. She initially presented to [facility] in [place] and was transferred here after CT showed [id number] intra-abdominal fluid and air collection posterior to rectus but anterior to mesh. Last BM this morning. Denies fevers, chills, vomiting, or any other complaints at this time.     Medications Prior to Admission   Medication Sig Dispense Refill Last Dose    atenolol-chlorthalidone (TENORETIC) 100-25 mg per tablet Take 1 tablet by mouth.   Taking    ciprofloxacin HCl (CIPRO) 500 MG tablet Take 500 mg by mouth.   Taking    docusate sodium (COLACE) 50 MG capsule Take 1 capsule (50 mg total) by mouth 2 (two) times daily.  0 Taking    escitalopram oxalate (LEXAPRO) 20 MG tablet Take 20 mg by mouth.   Taking    estradiol (ESTRACE) 1 MG tablet Take 1 mg by mouth once daily.    Taking    fluconazole (DIFLUCAN) 150 MG Tab    Taking    gabapentin (NEURONTIN) 300 MG capsule Take 1 capsule (300 mg total) by mouth 3 (three) times daily. 90 capsule 11 Taking    hydrOXYzine pamoate (VISTARIL) 25 MG Cap Take 25 mg by mouth every 8 (eight) hours as needed.    Taking    ibuprofen (ADVIL,MOTRIN) 800 MG tablet Take 1 tablet (800 mg total) by mouth 3 (three) times  daily. 90 tablet 1 Taking    lisinopril (PRINIVIL,ZESTRIL) 20 MG tablet Take 20 mg by mouth.   Taking    losartan (COZAAR) 100 MG tablet Take 0.5 tablets (50 mg total) by mouth every evening. (Patient taking differently: Take 100 mg by mouth once daily. ) 15 tablet 0 Taking    metroNIDAZOLE (FLAGYL) 500 MG tablet Take 500 mg by mouth.   Taking    neomycin (MYCIFRADIN) 500 mg Tab Take 1,000 mg by mouth.   Taking    omeprazole (PRILOSEC) 40 MG capsule Take 40 mg by mouth.   Taking    ondansetron (ZOFRAN) 8 MG tablet Take 8 mg by mouth every 8 (eight) hours as needed for Nausea.   Taking    oxyCODONE-acetaminophen (PERCOCET) 5-325 mg per tablet Take 1 tablet by mouth every 4 (four) hours as needed. 60 tablet 0 Taking    sulfamethoxazole-trimethoprim 800-160mg (BACTRIM DS) 800-160 mg Tab    Taking    traZODone (DESYREL) 50 MG tablet Take 50 mg by mouth nightly as needed.    Taking       Review of patient's allergies indicates:  No Known Allergies    Past Medical History:   Diagnosis Date    Anxiety     Diverticulitis     Fibromyalgia     Hypertension     Sleep apnea      Past Surgical History:   Procedure Laterality Date    CARPAL TUNNEL RELEASE Right     CHOLECYSTECTOMY      COLOSTOMY      COLOSTOMY CLOSURE  04/10/2018    COLOSTOMY-REVERSAL N/A 4/10/2018    Performed by [facility] at [facility] OR [address] [place] [address]    HERNIA REPAIR  04/10/2018    LUMBAR FUSION      PARTIAL HYSTERECTOMY  2002    REPAIR,HERNIA,INCISIONAL WITH MESH N/A 9/11/2018    Performed by Teo Baker MD at [facility] OR [address] [place] [address]    REPAIR-HERNIA-INCISIONAL-INITIAL Open N/A 4/10/2018    Performed by Teo Baker MD at [facility] OR [address] [place] [address]     Family History     None        Tobacco Use    Smoking status: Never Smoker    Smokeless tobacco: Never Used   Substance and Sexual Activity    Alcohol use: Yes     Comment: occasionally    Drug use: No    Sexual activity: Not on file     Review of Systems   Constitutional: Negative  for chills and fever.   HENT: Negative for sore throat.    Eyes: Negative for visual disturbance.   Respiratory: Negative for shortness of breath.    Cardiovascular: Negative for chest pain.   Gastrointestinal: Positive for abdominal distention, abdominal pain and nausea.   Genitourinary: Negative for dysuria.   Musculoskeletal: Negative for back pain.   Skin:        Fluid collection over right-sided abdomen.    Neurological: Negative for headaches.   Psychiatric/Behavioral: Negative for agitation.     Objective:     Vital Signs (Most Recent):  Temp: 96.7 °F (35.9 °C) (10/23/18 1732)  Pulse: 66 (10/23/18 1732)  Resp: 18 (10/23/18 1732)  BP: (!) 107/55 (10/23/18 1732)  SpO2: 100 % (10/23/18 1732) Vital Signs (24h Range):  Temp:  [96.7 °F (35.9 °C)-99 °F (37.2 °C)] 96.7 °F (35.9 °C)  Pulse:  [66-75] 66  Resp:  [14-18] 18  SpO2:  [97 %-100 %] 100 %  BP: (107-119)/(55-70) 107/55     Weight: 95.5 kg (210 lb 8.6 oz)  Body mass index is 35.04 kg/m².    Physical Exam   Constitutional: She appears well-developed and well-nourished. No distress.   HENT:   Head: Normocephalic and atraumatic.   Eyes: EOM are normal.   Neck: Normal range of motion.   Cardiovascular: Normal rate and regular rhythm.   No murmur heard.  Pulmonary/Chest: Effort normal and breath sounds normal. No respiratory distress. She has no wheezes. She has no rales.   Abdominal: Soft. There is tenderness (mild right-sided tenderness, mostly in upper quadrant). There is no guarding.   Midline laparotomy incision well-healed. Small 2x2cm area of skin breakdown with dry serosanguinous fluid overlying with no obvious fluctuation. Non-draining. No surrounding erythema.    Musculoskeletal: Normal range of motion.   Neurological: She is alert.   Skin: Skin is warm and dry. No erythema.   Psychiatric: She has a normal mood and affect. Her behavior is normal.   Nursing note and vitals reviewed.          Significant Labs:  pending    Significant Diagnostics:  CT CD in  chart. Being uploaded. Report reviewed.             Assessment and Plan:     Intraabdominal fluid collection    53-year-old female s/p colostomy reversal 04/18 and incision hernia repair with mesh 09/18 now with right-sided abdominal pain found to have a large fluid collection between mesh and rectus. Differential includes abscess versus seroma versus hematoma. Hemodynamically stable without white count. Non toxic appearing.   -Clears now with NPO midnight.   -Follow up labs and blood cultures  -Received vancomycin at outside hospital. Will start vanc and cefipime, especially considering gram positive involvement.   -Consult IR for possible drainage  -upload CT to [facility]  -Restart home meds         Kannan Davis MD  Vascular Surgery  [facility]

## 2018-10-24 NOTE — CARE UPDATE
Pt HDS  Please see H&P for physical exam findings and imaging  Case request placed for operative intervention but left in pending list  Will discuss with primary team IR drain placement vs operative I&D vs mesh explant for tomorrow or Thursday

## 2018-10-24 NOTE — PLAN OF CARE
Problem: Patient Care Overview  Goal: Plan of Care Review  Outcome: Ongoing (interventions implemented as appropriate)  Pt AAOx4, pleasant and cooperative to care. Showed the good understanding of POC. NPO was started from midnight and maintained. Pt able to verbalize the necessity of NPO. Well-complied. IV ABT Vanco and Cefepime started. No A/R noted. Pain management in progress with PRN PO Oxy with effect. Kept comfortable. All needs attended. Call light within easy reach. No c/o voiced.

## 2018-10-24 NOTE — PLAN OF CARE
Patient lives in a 1 story house w/3STE, w/railing, w/significant other. Patient is independent & agile prior to this admit. No needs determined.       10/24/18 1230   Discharge Assessment   Assessment Type Discharge Planning Assessment   Confirmed/corrected address and phone number on facesheet? Yes   Assessment information obtained from? Patient;Medical Record   Expected Length of Stay (days) (3)   Communicated expected length of stay with patient/caregiver no  (Per MD)   Prior to hospitilization cognitive status: Alert/Oriented;No Deficits   Prior to hospitalization functional status: Independent   Current cognitive status: Alert/Oriented;No Deficits   Current Functional Status: Independent   Facility Arrived From: ([facility], [place])   Lives With significant other   Able to Return to Prior Arrangements yes   Is patient able to care for self after discharge? Yes   Who are your caregiver(s) and their phone number(s)? (1. SO: Tommy no # given. 2. Sister: Jessie GASCA [phone])   Patient's perception of discharge disposition home or selfcare   Readmission Within The Last 30 Days no previous admission in last 30 days   Patient currently being followed by outpatient case management? No   Patient currently receives any other outside agency services? No   Equipment Currently Used at Home none  (Not in use-Has DME-straight cane, crutches.)   Do you have any problems affording any of your prescribed medications? No   Is the patient taking medications as prescribed? yes   Does the patient have transportation home? Yes   Transportation Available car;family or friend will provide   Dialysis Name and Scheduled days (N/A)   Does the patient receive services at the [facility]? No   Discharge Plan A Home with family   Discharge Plan B Home with family   Patient/Family In Agreement With Plan yes

## 2018-10-24 NOTE — ASSESSMENT & PLAN NOTE
54yo F admitted given large abscess following hernia repair 9/11    - NPO today in attempts to get IR drainage of fluid collection  - Will consult IR to evaluate for possible drain placement  - Continue antibiotics  - PRN pain and nausea meds  - daily labs  - DVT ppx

## 2018-10-24 NOTE — SUBJECTIVE & OBJECTIVE
Interval History: No acute events overnight, afebrile, vital signs stable. No nausea or vomiting, mild abdominal tenderness.     Medications:  Continuous Infusions:   lactated ringers Stopped (10/24/18 1115)     Scheduled Meds:   ceFEPime (MAXIPIME) IVPB  2 g Intravenous Q8H    escitalopram oxalate  20 mg Oral Daily    estradiol  1 mg Oral Daily    gabapentin  300 mg Oral TID    heparin (porcine)  5,000 Units Subcutaneous Q8H    lidocaine (PF) 10 mg/ml (1%)  1 mL Other Once    losartan  50 mg Oral QHS    vancomycin (VANCOCIN) IVPB  1,500 mg Intravenous Q12H     PRN Meds:ondansetron, oxyCODONE, oxyCODONE, promethazine (PHENERGAN) IVPB, sodium chloride 0.9%     Review of patient's allergies indicates:  No Known Allergies  Objective:     Vital Signs (Most Recent):  Temp: 97 °F (36.1 °C) (10/24/18 1225)  Pulse: 69 (10/24/18 1225)  Resp: 16 (10/24/18 1225)  BP: 129/70 (10/24/18 1225)  SpO2: (!) 93 % (10/24/18 1225) Vital Signs (24h Range):  Temp:  [96.7 °F (35.9 °C)-98.6 °F (37 °C)] 97 °F (36.1 °C)  Pulse:  [62-72] 69  Resp:  [16-20] 16  SpO2:  [93 %-100 %] 93 %  BP: (107-177)/(55-88) 129/70     Weight: 95.5 kg (210 lb 8.6 oz)  Body mass index is 35.04 kg/m².    Intake/Output - Last 3 Shifts       10/22 0700 - 10/23 0659 10/23 0700 - 10/24 0659 10/24 0700 - 10/25 0659           Urine Occurrence   3 x    Stool Occurrence   0 x          Physical Exam   Constitutional: She appears well-developed and well-nourished. No distress.   HENT:   Head: Normocephalic and atraumatic.   Eyes: EOM are normal.   Neck: Normal range of motion.   Cardiovascular: Normal rate and regular rhythm.   No murmur heard.  Pulmonary/Chest: Effort normal and breath sounds normal. No respiratory distress. She has no wheezes. She has no rales.   Abdominal: Soft. There is tenderness (mild right-sided tenderness, mostly in upper quadrant). There is no guarding.   Midline laparotomy incision well-healed. Small 2x2cm area of skin breakdown with dry  serosanguinous fluid overlying with no obvious fluctuation. Non-draining. No surrounding erythema.    Musculoskeletal: Normal range of motion.   Neurological: She is alert.   Skin: Skin is warm and dry. No erythema.   Psychiatric: She has a normal mood and affect. Her behavior is normal.   Nursing note and vitals reviewed.      Significant Labs:  CBC:   Recent Labs   Lab 10/24/18  0358   WBC 11.01   RBC 3.59*   HGB 9.6*   HCT 31.1*   *   MCV 87   MCH 26.7*   MCHC 30.9*     CMP:   Recent Labs   Lab 10/24/18  0358   GLU 98   CALCIUM 9.6   ALBUMIN 2.9*   PROT 8.7*   *   K 4.3   CO2 26      BUN 13   CREATININE 0.9   ALKPHOS 85   ALT 6*   AST 13   BILITOT 0.7       Significant Diagnostics:  I have reviewed all pertinent imaging results/findings within the past 24 hours.

## 2018-10-24 NOTE — PROCEDURES
Radiology Post-Procedure Note    Pre Op Diagnosis: ABscess    Post Op Diagnosis: Same    Procedure: US drainage    Procedure performed by: Wayne Soto MD    Written Informed Consent Obtained: Yes  Specimen Removed:  cc cloudy fluid  Estimated Blood Loss: Minimal    Findings:   Under US guidance, 10 Fr APD placed in anterior abdominal collection. No complications. SAmple sent to lab.    Patient tolerated procedure well.    Wayne Soto M.D.  Diagnostic and Interventional Radiologist  Department of Radiology  Pager: [phone]

## 2018-10-24 NOTE — PLAN OF CARE
Problem: Patient Care Overview  Goal: Plan of Care Review  Outcome: Outcome(s) achieved Date Met: 10/24/18  POC reviewed, remains NPO for I & D procedure, restarted PIV, RH infiltrated and swollen, elevated with cool compress, continues c/o abd pain, distended, IV antibiotics continued.

## 2018-10-24 NOTE — ASSESSMENT & PLAN NOTE
53-year-old female s/p colostomy reversal 04/18 and incision hernia repair with mesh 09/18 now with right-sided abdominal pain found to have a large fluid collection between mesh and rectus. Differential includes abscess versus seroma versus hematoma. Hemodynamically stable without white count. Non toxic appearing.   -Clears now with NPO midnight.   -Follow up labs and blood cultures  -Received vancomycin at outside hospital. Will start vanc and cefipime, especially considering gram positive involvement.   -Consult IR for possible drainage  -upload CT to [facility]  -Restart home meds

## 2018-10-24 NOTE — CONSULTS
Radiology History & Physical      SUBJECTIVE:       History of Present Illness:  Barbara Ortiz is a 53 y.o. female with history of diverticulutis s/p resection and incisional hernia repair with mes in 09/2018 who presents from OSH with with right sight sided abdominal pain, found to have large intraabdominal fluid collection posterior to rectus abdominus and anterior to mesh. IR consulted for abscess drain.    Past Medical History:   Diagnosis Date    Anxiety     Diverticulitis     Fibromyalgia     Hypertension     Sleep apnea      Past Surgical History:   Procedure Laterality Date    CARPAL TUNNEL RELEASE Right     CHOLECYSTECTOMY      COLOSTOMY      COLOSTOMY CLOSURE  04/10/2018    COLOSTOMY-REVERSAL N/A 4/10/2018    Performed by Teo Baker MD at [facility] OR [facility]    HERNIA REPAIR  04/10/2018    LUMBAR FUSION      PARTIAL HYSTERECTOMY  2002    REPAIR,HERNIA,INCISIONAL WITH MESH N/A 9/11/2018    Performed by Teo Baker MD at [facility] OR [address] [place] [address]    REPAIR-HERNIA-INCISIONAL-INITIAL Open N/A 4/10/2018    Performed by Teo Baker MD at [facility] OR [address] [place] [address]       Home Meds:   Prior to Admission medications    Medication Sig Start Date End Date Taking? Authorizing Provider   escitalopram oxalate (LEXAPRO) 20 MG tablet Take 20 mg by mouth.   Yes Historical Provider, MD   estradiol (ESTRACE) 1 MG tablet Take 1 mg by mouth once daily.  8/25/18  Yes Historical Provider, MD   hydrOXYzine pamoate (VISTARIL) 25 MG Cap Take 25 mg by mouth every 8 (eight) hours as needed.    Yes Historical Provider, MD   ibuprofen (ADVIL,MOTRIN) 800 MG tablet Take 1 tablet (800 mg total) by mouth 3 (three) times daily. 9/13/18  Yes GIO Orellana Jr., MD   omeprazole (PRILOSEC) 40 MG capsule Take 40 mg by mouth.   Yes Historical Provider, MD   ondansetron (ZOFRAN) 8 MG tablet Take 8 mg by mouth every 8 (eight) hours as needed for Nausea.   Yes Historical Provider, MD   traZODone (DESYREL) 50 MG tablet Take 50 mg by  mouth nightly as needed.    Yes Historical Provider, MD   atenolol-chlorthalidone (TENORETIC) 100-25 mg per tablet Take 1 tablet by mouth.    Historical Provider, MD   ciprofloxacin HCl (CIPRO) 500 MG tablet Take 500 mg by mouth. 7/6/17   Historical Provider, MD   docusate sodium (COLACE) 50 MG capsule Take 1 capsule (50 mg total) by mouth 2 (two) times daily. 4/17/18   Martha Cho PA-C   fluconazole (DIFLUCAN) 150 MG Tab  7/26/18   Historical Provider, MD   gabapentin (NEURONTIN) 300 MG capsule Take 1 capsule (300 mg total) by mouth 3 (three) times daily. 4/26/18 4/26/19  Martha Cho PA-C   lisinopril (PRINIVIL,ZESTRIL) 20 MG tablet Take 20 mg by mouth.    Historical Provider, MD   losartan (COZAAR) 100 MG tablet Take 0.5 tablets (50 mg total) by mouth every evening.  Patient taking differently: Take 100 mg by mouth once daily.  4/17/18   Fely Chahal MD   metroNIDAZOLE (FLAGYL) 500 MG tablet Take 500 mg by mouth. 7/6/17   Historical Provider, MD   neomycin (MYCIFRADIN) 500 mg Tab Take 1,000 mg by mouth. 7/6/17   Historical Provider, MD   oxyCODONE-acetaminophen (PERCOCET) 5-325 mg per tablet Take 1 tablet by mouth every 4 (four) hours as needed. 9/13/18   GIO Orellana Jr., MD   sulfamethoxazole-trimethoprim 800-160mg (BACTRIM DS) 800-160 mg Tab  9/17/18   Historical Provider, MD       Allergies: Review of patient's allergies indicates:  No Known Allergies  Sedation History:  no adverse reactions    Review of Systems:   Hematological: no known coagulopathies  Respiratory: no shortness of breath  Cardiovascular: no chest pain  Gastrointestinal: no abdominal pain  Genito-Urinary: no dysuria  Musculoskeletal: negative  Neurological: no TIA or stroke symptoms         OBJECTIVE:     Vital Signs (Most Recent)  Temp: 97.3 °F (36.3 °C) (10/24/18 0751)  Pulse: 62 (10/24/18 0751)  Resp: 20 (10/24/18 0751)  BP: 120/78 (10/24/18 0751)  SpO2: 98 % (10/24/18 0751)    Physical Exam:  ASA: 3  Mallampati:  2    General: no acute distress  Mental Status: alert and oriented to person, place and time  HEENT: normocephalic, atraumatic  Chest: unlabored breathing  Heart: regular heart rate  Abdomen: nondistended  Extremity: moves all extremities    Laboratory  No results found for: INR    Lab Results   Component Value Date    WBC 11.01 10/24/2018    HGB 9.6 (L) 10/24/2018    HCT 31.1 (L) 10/24/2018    MCV 87 10/24/2018     (H) 10/24/2018      Lab Results   Component Value Date    GLU 98 10/24/2018     (L) 10/24/2018    K 4.3 10/24/2018     10/24/2018    CO2 26 10/24/2018    BUN 13 10/24/2018    CREATININE 0.9 10/24/2018    CALCIUM 9.6 10/24/2018    MG 1.9 10/24/2018    ALT 6 (L) 10/24/2018    AST 13 10/24/2018    ALBUMIN 2.9 (L) 10/24/2018    BILITOT 0.7 10/24/2018       ASSESSMENT/PLAN:     Sedation Plan: Moderate.  Patient will undergo Abscess drain.    @SIG@

## 2018-10-24 NOTE — PROGRESS NOTES
[facility]  General Surgery  Progress Note    Subjective:     History of Present Illness:  No notes on file    Post-Op Info:  Procedure(s) (LRB):  Incision and Drainage (N/A)         Interval History: No acute events overnight, afebrile, vital signs stable. No nausea or vomiting, mild abdominal tenderness.     Medications:  Continuous Infusions:   lactated ringers Stopped (10/24/18 1115)     Scheduled Meds:   ceFEPime (MAXIPIME) IVPB  2 g Intravenous Q8H    escitalopram oxalate  20 mg Oral Daily    estradiol  1 mg Oral Daily    gabapentin  300 mg Oral TID    heparin (porcine)  5,000 Units Subcutaneous Q8H    lidocaine (PF) 10 mg/ml (1%)  1 mL Other Once    losartan  50 mg Oral QHS    vancomycin (VANCOCIN) IVPB  1,500 mg Intravenous Q12H     PRN Meds:ondansetron, oxyCODONE, oxyCODONE, promethazine (PHENERGAN) IVPB, sodium chloride 0.9%     Review of patient's allergies indicates:  No Known Allergies  Objective:     Vital Signs (Most Recent):  Temp: 97 °F (36.1 °C) (10/24/18 1225)  Pulse: 69 (10/24/18 1225)  Resp: 16 (10/24/18 1225)  BP: 129/70 (10/24/18 1225)  SpO2: (!) 93 % (10/24/18 1225) Vital Signs (24h Range):  Temp:  [96.7 °F (35.9 °C)-98.6 °F (37 °C)] 97 °F (36.1 °C)  Pulse:  [62-72] 69  Resp:  [16-20] 16  SpO2:  [93 %-100 %] 93 %  BP: (107-177)/(55-88) 129/70     Weight: 95.5 kg (210 lb 8.6 oz)  Body mass index is 35.04 kg/m².    Intake/Output - Last 3 Shifts       10/22 0700 - 10/23 0659 10/23 0700 - 10/24 0659 10/24 0700 - 10/25 0659           Urine Occurrence   3 x    Stool Occurrence   0 x          Physical Exam   Constitutional: She appears well-developed and well-nourished. No distress.   HENT:   Head: Normocephalic and atraumatic.   Eyes: EOM are normal.   Neck: Normal range of motion.   Cardiovascular: Normal rate and regular rhythm.   No murmur heard.  Pulmonary/Chest: Effort normal and breath sounds normal. No respiratory distress. She has no wheezes. She has no rales.    Abdominal: Soft. There is tenderness (mild right-sided tenderness, mostly in upper quadrant). There is no guarding.   Midline laparotomy incision well-healed. Small 2x2cm area of skin breakdown with dry serosanguinous fluid overlying with no obvious fluctuation. Non-draining. No surrounding erythema.    Musculoskeletal: Normal range of motion.   Neurological: She is alert.   Skin: Skin is warm and dry. No erythema.   Psychiatric: She has a normal mood and affect. Her behavior is normal.   Nursing note and vitals reviewed.      Significant Labs:  CBC:   Recent Labs   Lab 10/24/18  0358   WBC 11.01   RBC 3.59*   HGB 9.6*   HCT 31.1*   *   MCV 87   MCH 26.7*   MCHC 30.9*     CMP:   Recent Labs   Lab 10/24/18  0358   GLU 98   CALCIUM 9.6   ALBUMIN 2.9*   PROT 8.7*   *   K 4.3   CO2 26      BUN 13   CREATININE 0.9   ALKPHOS 85   ALT 6*   AST 13   BILITOT 0.7       Significant Diagnostics:  I have reviewed all pertinent imaging results/findings within the past 24 hours.    Assessment/Plan:     Abdominal wall abscess    52yo F admitted given large abscess following hernia repair 9/11    - NPO today in attempts to get IR drainage of fluid collection  - Will consult IR to evaluate for possible drain placement  - Continue antibiotics  - PRN pain and nausea meds  - daily labs  - DVT ppx         Milady Mendez MD  General Surgery  [facility]

## 2018-10-25 VITALS
SYSTOLIC BLOOD PRESSURE: 101 MMHG | DIASTOLIC BLOOD PRESSURE: 56 MMHG | WEIGHT: 210.56 LBS | BODY MASS INDEX: 35.08 KG/M2 | HEART RATE: 66 BPM | TEMPERATURE: 98 F | HEIGHT: 65 IN | OXYGEN SATURATION: 95 % | RESPIRATION RATE: 18 BRPM

## 2018-10-25 LAB
ALBUMIN SERPL BCP-MCNC: 3 G/DL
ALP SERPL-CCNC: 77 U/L
ALT SERPL W/O P-5'-P-CCNC: 6 U/L
ANION GAP SERPL CALC-SCNC: 8 MMOL/L
ANION GAP SERPL CALC-SCNC: 9 MMOL/L
AST SERPL-CCNC: 16 U/L
BASOPHILS # BLD AUTO: 0.03 K/UL
BASOPHILS NFR BLD: 0.3 %
BILIRUB SERPL-MCNC: 0.5 MG/DL
BUN SERPL-MCNC: 13 MG/DL
BUN SERPL-MCNC: 14 MG/DL
CALCIUM SERPL-MCNC: 9.1 MG/DL
CALCIUM SERPL-MCNC: 9.4 MG/DL
CHLORIDE SERPL-SCNC: 100 MMOL/L
CHLORIDE SERPL-SCNC: 102 MMOL/L
CO2 SERPL-SCNC: 23 MMOL/L
CO2 SERPL-SCNC: 26 MMOL/L
CREAT SERPL-MCNC: 1.3 MG/DL
CREAT SERPL-MCNC: 1.6 MG/DL
DIFFERENTIAL METHOD: ABNORMAL
EOSINOPHIL # BLD AUTO: 0.2 K/UL
EOSINOPHIL NFR BLD: 1.7 %
ERYTHROCYTE [DISTWIDTH] IN BLOOD BY AUTOMATED COUNT: 14.6 %
EST. GFR  (AFRICAN AMERICAN): 42.7 ML/MIN/1.73 M^2
EST. GFR  (AFRICAN AMERICAN): 54.9 ML/MIN/1.73 M^2
EST. GFR  (NON AFRICAN AMERICAN): 37 ML/MIN/1.73 M^2
EST. GFR  (NON AFRICAN AMERICAN): 47.6 ML/MIN/1.73 M^2
GLUCOSE SERPL-MCNC: 101 MG/DL
GLUCOSE SERPL-MCNC: 121 MG/DL
HCT VFR BLD AUTO: 31.5 %
HGB BLD-MCNC: 9.7 G/DL
IMM GRANULOCYTES # BLD AUTO: 0.04 K/UL
IMM GRANULOCYTES NFR BLD AUTO: 0.4 %
LYMPHOCYTES # BLD AUTO: 1.4 K/UL
LYMPHOCYTES NFR BLD: 14.7 %
MAGNESIUM SERPL-MCNC: 1.8 MG/DL
MCH RBC QN AUTO: 27.1 PG
MCHC RBC AUTO-ENTMCNC: 30.8 G/DL
MCV RBC AUTO: 88 FL
MONOCYTES # BLD AUTO: 0.6 K/UL
MONOCYTES NFR BLD: 6.9 %
NEUTROPHILS # BLD AUTO: 7 K/UL
NEUTROPHILS NFR BLD: 76 %
NRBC BLD-RTO: 0 /100 WBC
PHOSPHATE SERPL-MCNC: 5.1 MG/DL
PLATELET # BLD AUTO: 381 K/UL
PMV BLD AUTO: 9.6 FL
POTASSIUM SERPL-SCNC: 3.8 MMOL/L
POTASSIUM SERPL-SCNC: 4.4 MMOL/L
PROT SERPL-MCNC: 8.9 G/DL
RBC # BLD AUTO: 3.58 M/UL
SODIUM SERPL-SCNC: 132 MMOL/L
SODIUM SERPL-SCNC: 136 MMOL/L
WBC # BLD AUTO: 9.16 K/UL

## 2018-10-25 PROCEDURE — 80053 COMPREHEN METABOLIC PANEL: CPT

## 2018-10-25 PROCEDURE — 85025 COMPLETE CBC W/AUTO DIFF WBC: CPT

## 2018-10-25 PROCEDURE — 63600175 PHARM REV CODE 636 W HCPCS: Performed by: STUDENT IN AN ORGANIZED HEALTH CARE EDUCATION/TRAINING PROGRAM

## 2018-10-25 PROCEDURE — 83735 ASSAY OF MAGNESIUM: CPT

## 2018-10-25 PROCEDURE — 36415 COLL VENOUS BLD VENIPUNCTURE: CPT

## 2018-10-25 PROCEDURE — 84100 ASSAY OF PHOSPHORUS: CPT

## 2018-10-25 PROCEDURE — 25000003 PHARM REV CODE 250: Performed by: STUDENT IN AN ORGANIZED HEALTH CARE EDUCATION/TRAINING PROGRAM

## 2018-10-25 PROCEDURE — 80048 BASIC METABOLIC PNL TOTAL CA: CPT

## 2018-10-25 RX ORDER — HYDROXYZINE PAMOATE 25 MG/1
25 CAPSULE ORAL EVERY 8 HOURS PRN
Status: DISCONTINUED | OUTPATIENT
Start: 2018-10-25 | End: 2018-10-25 | Stop reason: HOSPADM

## 2018-10-25 RX ORDER — SULFAMETHOXAZOLE AND TRIMETHOPRIM 800; 160 MG/1; MG/1
1 TABLET ORAL 2 TIMES DAILY
Status: DISCONTINUED | OUTPATIENT
Start: 2018-10-25 | End: 2018-10-25 | Stop reason: HOSPADM

## 2018-10-25 RX ORDER — FLUCONAZOLE 150 MG/1
150 TABLET ORAL DAILY
Qty: 1 TABLET | Refills: 0 | Status: SHIPPED | OUTPATIENT
Start: 2018-10-25 | End: 2018-10-26

## 2018-10-25 RX ORDER — SODIUM CHLORIDE, SODIUM LACTATE, POTASSIUM CHLORIDE, CALCIUM CHLORIDE 600; 310; 30; 20 MG/100ML; MG/100ML; MG/100ML; MG/100ML
INJECTION, SOLUTION INTRAVENOUS CONTINUOUS
Status: DISCONTINUED | OUTPATIENT
Start: 2018-10-25 | End: 2018-10-25 | Stop reason: HOSPADM

## 2018-10-25 RX ORDER — SULFAMETHOXAZOLE AND TRIMETHOPRIM 800; 160 MG/1; MG/1
1 TABLET ORAL 2 TIMES DAILY
Qty: 20 TABLET | Refills: 0 | Status: SHIPPED | OUTPATIENT
Start: 2018-10-25 | End: 2018-11-04

## 2018-10-25 RX ORDER — OXYCODONE HYDROCHLORIDE 5 MG/1
5 TABLET ORAL EVERY 4 HOURS PRN
Qty: 15 TABLET | Refills: 0 | Status: SHIPPED | OUTPATIENT
Start: 2018-10-25 | End: 2018-11-09

## 2018-10-25 RX ADMIN — ESTRADIOL 1 MG: 1 TABLET ORAL at 11:10

## 2018-10-25 RX ADMIN — ESCITALOPRAM 20 MG: 20 TABLET, FILM COATED ORAL at 11:10

## 2018-10-25 RX ADMIN — GABAPENTIN 300 MG: 300 CAPSULE ORAL at 11:10

## 2018-10-25 RX ADMIN — SODIUM CHLORIDE, SODIUM LACTATE, POTASSIUM CHLORIDE, AND CALCIUM CHLORIDE: .6; .31; .03; .02 INJECTION, SOLUTION INTRAVENOUS at 11:10

## 2018-10-25 RX ADMIN — HEPARIN SODIUM 5000 UNITS: 5000 INJECTION, SOLUTION INTRAVENOUS; SUBCUTANEOUS at 02:10

## 2018-10-25 RX ADMIN — HYDROMORPHONE HYDROCHLORIDE 1 MG: 1 INJECTION, SOLUTION INTRAMUSCULAR; INTRAVENOUS; SUBCUTANEOUS at 12:10

## 2018-10-25 RX ADMIN — HYDROXYZINE PAMOATE 25 MG: 25 CAPSULE ORAL at 12:10

## 2018-10-25 RX ADMIN — ONDANSETRON 8 MG: 8 TABLET, ORALLY DISINTEGRATING ORAL at 10:10

## 2018-10-25 RX ADMIN — GABAPENTIN 300 MG: 300 CAPSULE ORAL at 02:10

## 2018-10-25 RX ADMIN — SODIUM CHLORIDE 1000 ML: 0.9 INJECTION, SOLUTION INTRAVENOUS at 11:10

## 2018-10-25 RX ADMIN — OXYCODONE HYDROCHLORIDE 10 MG: 10 TABLET ORAL at 10:10

## 2018-10-25 RX ADMIN — SULFAMETHOXAZOLE AND TRIMETHOPRIM 1 TABLET: 800; 160 TABLET ORAL at 11:10

## 2018-10-25 RX ADMIN — HEPARIN SODIUM 5000 UNITS: 5000 INJECTION, SOLUTION INTRAVENOUS; SUBCUTANEOUS at 06:10

## 2018-10-25 NOTE — SUBJECTIVE & OBJECTIVE
Interval History:  No acute events overnight, afebrile, vital signs stable. No nausea or vomiting, mild abdominal tenderness. IR drain placed yesterday with 950cc output.     Medications:  Continuous Infusions:   lactated ringers       Scheduled Meds:   escitalopram oxalate  20 mg Oral Daily    estradiol  1 mg Oral Daily    gabapentin  300 mg Oral TID    heparin (porcine)  5,000 Units Subcutaneous Q8H    lidocaine (PF) 10 mg/ml (1%)  1 mL Other Once    losartan  50 mg Oral QHS    sodium chloride 0.9%  1,000 mL Intravenous Once    sulfamethoxazole-trimethoprim 800-160mg  1 tablet Oral BID     PRN Meds:diphenhydrAMINE, ondansetron, oxyCODONE, oxyCODONE, promethazine (PHENERGAN) IVPB, sodium chloride 0.9%     Review of patient's allergies indicates:  No Known Allergies  Objective:     Vital Signs (Most Recent):  Temp: 97.7 °F (36.5 °C) (10/25/18 0823)  Pulse: 70 (10/25/18 0823)  Resp: 16 (10/25/18 0823)  BP: 127/86 (10/25/18 0823)  SpO2: 99 % (10/25/18 0823) Vital Signs (24h Range):  Temp:  [97 °F (36.1 °C)-98.7 °F (37.1 °C)] 97.7 °F (36.5 °C)  Pulse:  [60-87] 70  Resp:  [16-20] 16  SpO2:  [93 %-100 %] 99 %  BP: (103-130)/(65-86) 127/86     Weight: 95.5 kg (210 lb 8.6 oz)  Body mass index is 35.04 kg/m².    Intake/Output - Last 3 Shifts       10/23 0700 - 10/24 0659 10/24 0700 - 10/25 0659 10/25 0700 - 10/26 0659    Drains  950     Total Output  950     Net  -950            Urine Occurrence  3 x     Stool Occurrence  0 x           Physical Exam   Constitutional: She appears well-developed and well-nourished. No distress.   HENT:   Head: Normocephalic and atraumatic.   Eyes: EOM are normal.   Neck: Normal range of motion.   Cardiovascular: Normal rate and regular rhythm.   No murmur heard.  Pulmonary/Chest: Effort normal and breath sounds normal. No respiratory distress. She has no wheezes. She has no rales.   Abdominal: Soft. There is tenderness (mild right-sided tenderness, mostly in upper quadrant). There is  no guarding.   Midline laparotomy incision well-healed. Small 2x2cm area of skin breakdown with dry serosanguinous fluid overlying with no obvious fluctuation. Non-draining. No surrounding erythema.   IR drain with cloudy output   Musculoskeletal: Normal range of motion.   Neurological: She is alert.   Skin: Skin is warm and dry. No erythema.   Psychiatric: She has a normal mood and affect. Her behavior is normal.   Nursing note and vitals reviewed.      Significant Labs:  CBC:   Recent Labs   Lab 10/25/18  0435   WBC 9.16   RBC 3.58*   HGB 9.7*   HCT 31.5*   *   MCV 88   MCH 27.1   MCHC 30.8*     CMP:   Recent Labs   Lab 10/25/18  0435   *   CALCIUM 9.4   ALBUMIN 3.0*   PROT 8.9*   *   K 4.4   CO2 23      BUN 13   CREATININE 1.6*   ALKPHOS 77   ALT 6*   AST 16   BILITOT 0.5       Significant Diagnostics:  I have reviewed all pertinent imaging results/findings within the past 24 hours.

## 2018-10-25 NOTE — NURSING
Pt anxious c/o pain on both ears and abdomen 10/10 pain scale  Medicated with po oxy Dr Bonilla pageaugustin through ooperator pt stated I'm having anxiety attack and I take vistaril q 8 hrs awaiting to call back

## 2018-10-25 NOTE — PLAN OF CARE
Pt AAOx4, pleasant and cooperative to care. Showed the good understanding of POC. S/P I&D to abdomen and LEON drain placement. Milky pink color drainage noted in bulb. Output recorded. Pt c/o pain even after PRN Oxy 10mg taken. On call MD made aware and one time IV dilaudid 1mg ordered. Carried out with effect. Call light within easy reach. All needs attended. Clear liquid diet was resumed and maintained. No s/s of apparent distress noted. Continue to monitor and document any changes.

## 2018-10-25 NOTE — PROGRESS NOTES
[facility]  General Surgery  Progress Note    Subjective:     History of Present Illness:  No notes on file    Post-Op Info:  Procedure(s) (LRB):  Incision and Drainage (N/A)         Interval History:  No acute events overnight, afebrile, vital signs stable. No nausea or vomiting, mild abdominal tenderness. IR drain placed yesterday with 950cc output.     Medications:  Continuous Infusions:   lactated ringers       Scheduled Meds:   escitalopram oxalate  20 mg Oral Daily    estradiol  1 mg Oral Daily    gabapentin  300 mg Oral TID    heparin (porcine)  5,000 Units Subcutaneous Q8H    lidocaine (PF) 10 mg/ml (1%)  1 mL Other Once    losartan  50 mg Oral QHS    sodium chloride 0.9%  1,000 mL Intravenous Once    sulfamethoxazole-trimethoprim 800-160mg  1 tablet Oral BID     PRN Meds:diphenhydrAMINE, ondansetron, oxyCODONE, oxyCODONE, promethazine (PHENERGAN) IVPB, sodium chloride 0.9%     Review of patient's allergies indicates:  No Known Allergies  Objective:     Vital Signs (Most Recent):  Temp: 97.7 °F (36.5 °C) (10/25/18 0823)  Pulse: 70 (10/25/18 0823)  Resp: 16 (10/25/18 0823)  BP: 127/86 (10/25/18 0823)  SpO2: 99 % (10/25/18 0823) Vital Signs (24h Range):  Temp:  [97 °F (36.1 °C)-98.7 °F (37.1 °C)] 97.7 °F (36.5 °C)  Pulse:  [60-87] 70  Resp:  [16-20] 16  SpO2:  [93 %-100 %] 99 %  BP: (103-130)/(65-86) 127/86     Weight: 95.5 kg (210 lb 8.6 oz)  Body mass index is 35.04 kg/m².    Intake/Output - Last 3 Shifts       10/23 0700 - 10/24 0659 10/24 0700 - 10/25 0659 10/25 0700 - 10/26 0659    Drains  950     Total Output  950     Net  -950            Urine Occurrence  3 x     Stool Occurrence  0 x           Physical Exam   Constitutional: She appears well-developed and well-nourished. No distress.   HENT:   Head: Normocephalic and atraumatic.   Eyes: EOM are normal.   Neck: Normal range of motion.   Cardiovascular: Normal rate and regular rhythm.   No murmur heard.  Pulmonary/Chest:  Effort normal and breath sounds normal. No respiratory distress. She has no wheezes. She has no rales.   Abdominal: Soft. There is tenderness (mild right-sided tenderness, mostly in upper quadrant). There is no guarding.   Midline laparotomy incision well-healed. Small 2x2cm area of skin breakdown with dry serosanguinous fluid overlying with no obvious fluctuation. Non-draining. No surrounding erythema.   IR drain with cloudy output   Musculoskeletal: Normal range of motion.   Neurological: She is alert.   Skin: Skin is warm and dry. No erythema.   Psychiatric: She has a normal mood and affect. Her behavior is normal.   Nursing note and vitals reviewed.      Significant Labs:  CBC:   Recent Labs   Lab 10/25/18  0435   WBC 9.16   RBC 3.58*   HGB 9.7*   HCT 31.5*   *   MCV 88   MCH 27.1   MCHC 30.8*     CMP:   Recent Labs   Lab 10/25/18  0435   *   CALCIUM 9.4   ALBUMIN 3.0*   PROT 8.9*   *   K 4.4   CO2 23      BUN 13   CREATININE 1.6*   ALKPHOS 77   ALT 6*   AST 16   BILITOT 0.5       Significant Diagnostics:  I have reviewed all pertinent imaging results/findings within the past 24 hours.    Assessment/Plan:     Abdominal wall abscess    52yo F admitted given large abscess following hernia repair 9/11 now s/p IR drain placement 10/24    - Regular diet  - Transition antibiotics to PO Bactrim, no growth on gram stain  - F/u cultures  - 1L NS bolus and continue fluids given bump in Cr to 1.6, repeat BMP at 3pm  - PRN pain and nausea meds  - daily labs  - DVT ppx  - Possible PM discharge with PO antibiotics if CORONA resolves         Milady Mendez MD  General Surgery  [facility]

## 2018-10-25 NOTE — DISCHARGE INSTRUCTIONS
"  Caring for a Closed Suction Drainage Tube  A drainage tube removes fluid from around an incision. This helps prevent infection and promotes healing. The collection bulb at the end of the tube is squeezed and plugged to create suction. The bulb should be emptied and reset when half full to maintain adequate suction. You need to empty the bulb and clean the skin around the drain as often as your healthcare provider tells you to. Follow the steps below.     What youll need  Have the following items ready:  · Disposable gloves  · Measuring cup  · Record sheet  · Gauze or paper towel  · Sterile cotton swabs or 4" x 4" gauze pads  · Sterile saline or soap and water       Step 1. Empty the bulb  · Wash your hands and put on a new pair of disposable gloves.  · Point the top of the bulb away from you and remove the stopper.  · Turn the bulb upside down over a measuring cup. Squeeze the fluid into the cup. Make sure the bulb is totally empty.  · Put the cup to one side. You can record the volume of liquid in the cup after you clean and reconnect the bulb in step 2.    Step 2. Clean and reconnect the bulb  · Clean the top of the bulb with clean gauze or a paper towel, if needed.  · Squeeze the bulb tight, and put the stopper back on the top.  · Record the amount of fluid in the cup. Then, empty the cup as directed.    Step 3. Clean the site  · Remove your disposable gloves and wash your hands before cleaning the site.  · Put on a new pair of disposable gloves.  · Wet a sterile cotton swab or 4" x 4" gauze pad with sterile saline or soap and water.  · Gently clean the skin around the drain. Always wipe away from the incision.  · Apply an antibacterial ointment if directed.   When to call your healthcare provider  Call your healthcare provider if you notice any of these changes:  · The amount of fluid increases or decreases suddenly  · Large amount of blood or a clot in drainage  · Color, odor, or thickness of the fluid " "changes  · Tube falls out or the incision opens  · Skin around the drain is red, swollen, painful, or seeping pus  · You have a fever of 100.4°F (38°C) or higher, or as directed by your healthcare provider     If the tube isn't draining  Here are tips to drain the tube:  · Uncurl any kinks in the tube.  · With one hand, firmly hold the base of the tube between your thumb and index finger. Do not touch the incision.  · Put the thumb and index finger of your other hand on the tube, next to the first hand. Pinch your fingers together. Then pull them along the tube toward the bag. This will help push any clogged fluid through the tube. This is called "stripping the tube." You may find it helpful to hold an alcohol swab between your fingers and the tube to lubricate the tubing.  · If the tube still does not drain, call your healthcare provider.   Date Last Reviewed: 12/1/2016  © 4476-1409 [employer]. [address] [place] [address], [place] [address]. All rights reserved. This information is not intended as a substitute for professional medical care. Always follow your healthcare professional's instructions.        "

## 2018-10-25 NOTE — PROGRESS NOTES
Pt had the sever pain and burning sensation on receiving ABT Cefepime IV push, and could not tolerate it. MD made aware.

## 2018-10-25 NOTE — NURSING
Discharge instructions given to patient.  Patient verbalized understanding and had no further questions.  PAtients IV removed and vital signs within normal limits.  PAtient's ride from family will arrive at approximately 7-8PM. Will continue to follow up.

## 2018-10-25 NOTE — ASSESSMENT & PLAN NOTE
54yo F admitted given large abscess following hernia repair 9/11 now s/p IR drain placement 10/24    - Regular diet  - Transition antibiotics to PO Bactrim, no growth on gram stain  - F/u cultures  - 1L NS bolus and continue fluids given bump in Cr to 1.6, repeat BMP at 3pm  - PRN pain and nausea meds  - daily labs  - DVT ppx  - Possible PM discharge with PO antibiotics if CORONA resolves

## 2018-10-25 NOTE — PLAN OF CARE
10/25/18 1547   Final Note   Assessment Type Final Discharge Note   Anticipated Discharge Disposition Home   What phone number can be called within the next 1-3 days to see how you are doing after discharge? ([phone])   Hospital Follow Up  Appt(s) scheduled? Yes   Discharge plans and expectations educations in teach back method with documentation complete? Yes   Right Care Referral Info   Post Acute Recommendation No Care

## 2018-10-26 NOTE — DISCHARGE SUMMARY
[facility]  General Surgery  Discharge Summary      Patient Name: Barbara Ortiz  MRN: [id number]  Admission Date: 10/23/2018  Hospital Length of Stay: 2 days  Discharge Date and Time:  10/26/2018 5:54 AM  Attending Physician: No att. providers found   Discharging Provider: Milady Mendez MD  Primary Care Provider: Noah Cordero MD     HPI: 53-year-old female with history of diverticulitis s/p resection followed by colostomy reversal 04/2018 as well as incisional hernia repair with mesh 09/2018 presenting today with 4-5 days of moderate-severe stabbing right-sided abdominal pain that seems to wax and wane. Associated symptoms include nausea, bloating, and a small area of fluctuance over her right-sided abdomen that opened and drained last night. Unsure if drainage was clear of pus. She initially presented to [facility] in [place] and was transferred here after CT showed [id number] intra-abdominal fluid and air collection posterior to rectus but anterior to mesh. Last BM this morning. Denies fevers, chills, vomiting, or any other complaints at this time.       Procedure(s) (LRB):  Incision and Drainage (N/A)     Hospital Course: Patient admitted as transfer given abdominal pain and evidence for large possible abscess adjacent to mesh following recent hernia repair 9/11. She was put on IV Cefepime and Vancomycin and IR was consulted for drain placement. Following placement of drain her WBC improved, as did her pain. She was able to tolerate a regular diet. She was transferred to oral antibiotics (Bactrim). She did develop a CORONA with Cr to 1.6 which improved following fluid bolus to 1.3. Given this she was deemed stable for discharge home with PO antibiotics and drain in place with clinic follow up.     Consults:   Consults (From admission, onward)        Status Ordering Provider     Inpatient consult to Interventional Radiology  Once     Provider:  (Not yet assigned)    MISHA Romero      Inpatient [facility] team [facility]  Once     Provider:  (Not yet assigned)    Completed FLORENCE JENNINGS          Significant Diagnostic Studies: Labs:   BMP:   Recent Labs   Lab 10/25/18  0435 10/25/18  1225   * 101   * 136   K 4.4 3.8    102   CO2 23 26   BUN 13 14   CREATININE 1.6* 1.3   CALCIUM 9.4 9.1   MG 1.8  --     and CBC   Recent Labs   Lab 10/25/18  0435   WBC 9.16   HGB 9.7*   HCT 31.5*   *       Pending Diagnostic Studies:     None        Final Active Diagnoses:    Diagnosis Date Noted POA    PRINCIPAL PROBLEM:  Abdominal wall abscess [L02.211] 10/23/2018 Yes    Intraabdominal fluid collection [R18.8] 10/23/2018 Unknown      Problems Resolved During this Admission:      Discharged Condition: good    Disposition: Home or Self Care    Follow Up:  Follow-up Information     Florence Jennings MD In 1 week.    Specialty:  General Surgery  Why:  IR drain-Appt 11/01/18  at 1:30 PM w/Appt letter to be mailed.    Contact information:  [address]  [place] [id number] [phone]                 Patient Instructions:      Notify your health care provider if you experience any of the following:  increased confusion or weakness     Notify your health care provider if you experience any of the following:  persistent dizziness, light-headedness, or visual disturbances     Notify your health care provider if you experience any of the following:  worsening rash     Notify your health care provider if you experience any of the following:  severe persistent headache     Notify your health care provider if you experience any of the following:  difficulty breathing or increased cough     Notify your health care provider if you experience any of the following:  redness, tenderness, or signs of infection (pain, swelling, redness, odor or green/yellow discharge around incision site)     Notify your health care provider if you experience any of the following:  severe uncontrolled  pain     Notify your health care provider if you experience any of the following:  persistent nausea and vomiting or diarrhea     Notify your health care provider if you experience any of the following:  temperature >100.4     No dressing needed     Activity as tolerated     Medications:  Reconciled Home Medications:      Medication List      START taking these medications    oxyCODONE 5 MG immediate release tablet  Commonly known as:  ROXICODONE  Take 1 tablet (5 mg total) by mouth every 4 (four) hours as needed.        CHANGE how you take these medications    * fluconazole 150 MG Tab  Commonly known as:  DIFLUCAN  What changed:  Another medication with the same name was added. Make sure you understand how and when to take each.     * fluconazole 150 MG Tab  Commonly known as:  DIFLUCAN  Take 1 tablet (150 mg total) by mouth once daily. for 1 day  What changed:  You were already taking a medication with the same name, and this prescription was added. Make sure you understand how and when to take each.     losartan 100 MG tablet  Commonly known as:  COZAAR  Take 0.5 tablets (50 mg total) by mouth every evening.  What changed:    · how much to take  · when to take this     * sulfamethoxazole-trimethoprim 800-160mg 800-160 mg Tab  Commonly known as:  BACTRIM DS  What changed:  Another medication with the same name was added. Make sure you understand how and when to take each.     * sulfamethoxazole-trimethoprim 800-160mg 800-160 mg Tab  Commonly known as:  BACTRIM DS  Take 1 tablet by mouth 2 (two) times daily. for 10 days  What changed:  You were already taking a medication with the same name, and this prescription was added. Make sure you understand how and when to take each.         * This list has 4 medication(s) that are the same as other medications prescribed for you. Read the directions carefully, and ask your doctor or other care provider to review them with you.            CONTINUE taking these medications     atenolol-chlorthalidone 100-25 mg per tablet  Commonly known as:  TENORETIC  Take 1 tablet by mouth.     ciprofloxacin HCl 500 MG tablet  Commonly known as:  CIPRO  Take 500 mg by mouth.     docusate sodium 50 MG capsule  Commonly known as:  COLACE  Take 1 capsule (50 mg total) by mouth 2 (two) times daily.     escitalopram oxalate 20 MG tablet  Commonly known as:  LEXAPRO  Take 20 mg by mouth.     estradiol 1 MG tablet  Commonly known as:  ESTRACE  Take 1 mg by mouth once daily.     gabapentin 300 MG capsule  Commonly known as:  NEURONTIN  Take 1 capsule (300 mg total) by mouth 3 (three) times daily.     hydrOXYzine pamoate 25 MG Cap  Commonly known as:  VISTARIL  Take 25 mg by mouth every 8 (eight) hours as needed.     ibuprofen 800 MG tablet  Commonly known as:  ADVIL,MOTRIN  Take 1 tablet (800 mg total) by mouth 3 (three) times daily.     lisinopril 20 MG tablet  Commonly known as:  PRINIVIL,ZESTRIL  Take 20 mg by mouth.     metroNIDAZOLE 500 MG tablet  Commonly known as:  FLAGYL  Take 500 mg by mouth.     neomycin 500 mg Tab  Commonly known as:  MYCIFRADIN  Take 1,000 mg by mouth.     omeprazole 40 MG capsule  Commonly known as:  PRILOSEC  Take 40 mg by mouth.     ondansetron 8 MG tablet  Commonly known as:  ZOFRAN  Take 8 mg by mouth every 8 (eight) hours as needed for Nausea.     oxyCODONE-acetaminophen 5-325 mg per tablet  Commonly known as:  PERCOCET  Take 1 tablet by mouth every 4 (four) hours as needed.     traZODone 50 MG tablet  Commonly known as:  DESYREL  Take 50 mg by mouth nightly as needed.            Milady Mendez MD  General Surgery  [facility]

## 2018-10-27 LAB — BACTERIA SPEC AEROBE CULT: NORMAL

## 2018-10-28 LAB — BACTERIA BLD CULT: NORMAL

## 2018-10-29 LAB — BACTERIA SPEC ANAEROBE CULT: NORMAL

## 2018-11-01 ENCOUNTER — OFFICE VISIT (OUTPATIENT)
Dept: SURGERY | Facility: CLINIC | Age: 53
End: 2018-11-01
Payer: COMMERCIAL

## 2018-11-01 VITALS
WEIGHT: 210 LBS | HEART RATE: 69 BPM | HEIGHT: 65 IN | BODY MASS INDEX: 34.99 KG/M2 | SYSTOLIC BLOOD PRESSURE: 166 MMHG | DIASTOLIC BLOOD PRESSURE: 79 MMHG

## 2018-11-01 DIAGNOSIS — L02.211 ABDOMINAL WALL ABSCESS: Primary | ICD-10-CM

## 2018-11-01 PROCEDURE — 99999 PR PBB SHADOW E&M-EST. PATIENT-LVL IV: CPT | Mod: PBBFAC,,, | Performed by: PHYSICIAN ASSISTANT

## 2018-11-01 PROCEDURE — 99024 POSTOP FOLLOW-UP VISIT: CPT | Mod: S$GLB,,, | Performed by: PHYSICIAN ASSISTANT

## 2018-11-01 RX ORDER — SCOLOPAMINE TRANSDERMAL SYSTEM 1 MG/1
1 PATCH, EXTENDED RELEASE TRANSDERMAL
Qty: 10 PATCH | Refills: 0 | Status: SHIPPED | OUTPATIENT
Start: 2018-11-01 | End: 2018-11-07

## 2018-11-01 NOTE — PROGRESS NOTES
HPI:  The patient is status post-IR drain placement for abdominal wall abscess. She is still on antibiotics and has about a week left. She states drain has been putting out >2oz/day. She is having difficulty with mild skin excoriation as well as drain site irritation when he bra rubs against it. She still mentions lower abdominal pain and nausea. She is eating well. She notes her stomach is much smaller since initial presentation.    PHYSICAL EXAM:  Physical Exam   Constitutional: She is oriented to person, place, and time. She appears well-developed and well-nourished. No distress.   HENT:   Head: Normocephalic and atraumatic.   Cardiovascular: Normal rate and regular rhythm.   Pulmonary/Chest: Effort normal. No respiratory distress.   Abdominal:   Soft, appropriate TTP at drain site, remainder of abdomen benign  Slight skin peeling noted near drain site, no signs or symptoms of infection  IR drain with thin serous, slight purulent drainage   Neurological: She is alert and oriented to person, place, and time.       A/P  52 yo female w abdominal wall abscess, s/p IR drain placement  -continue drain for now, as output is still a high  -will send scopolamine to pharmacy  -will return to clinic in one week for drain check

## 2018-11-05 ENCOUNTER — TELEPHONE (OUTPATIENT)
Dept: SURGERY | Facility: CLINIC | Age: 53
End: 2018-11-05

## 2018-11-05 NOTE — TELEPHONE ENCOUNTER
----- Message from Memo Sebastian sent at 11/5/2018  9:30 AM CST -----  Reason for call:Drain issues        Communication Preference:[phone]    Additional Information:Pt states the container that fluid drains in wont stay inflated or suction. She says yesterday drain was leaking around the part that unscrews but she placed tape around it.

## 2018-11-05 NOTE — TELEPHONE ENCOUNTER
----- Message from Memo Sebastian sent at 11/5/2018 10:27 AM CST -----  Communication Preference:[phone]    Additional Information:Pt states she need to speak with nurse to find out if she has to come to [facility] to get her drain checked or can she go to one of her local hospitals and have them change it out if it needs to be changed.

## 2018-11-05 NOTE — TELEPHONE ENCOUNTER
Pt called to report that her LEON Drain no longer holds suction and is leaking at the stopcock connection.  She placed take around the connection, but is still unable to hold suction in the bulb.  She will come to clinic tomorrow 11/6/18 at 1:30pm for drain assessment.  She verbalized understanding.

## 2018-11-07 ENCOUNTER — OFFICE VISIT (OUTPATIENT)
Dept: SURGERY | Facility: CLINIC | Age: 53
End: 2018-11-07
Payer: COMMERCIAL

## 2018-11-07 VITALS
WEIGHT: 209.44 LBS | SYSTOLIC BLOOD PRESSURE: 174 MMHG | TEMPERATURE: 99 F | BODY MASS INDEX: 34.89 KG/M2 | HEART RATE: 67 BPM | HEIGHT: 65 IN | DIASTOLIC BLOOD PRESSURE: 87 MMHG

## 2018-11-07 DIAGNOSIS — L02.211 ABDOMINAL WALL ABSCESS: Primary | ICD-10-CM

## 2018-11-07 PROCEDURE — 99024 POSTOP FOLLOW-UP VISIT: CPT | Mod: S$GLB,,, | Performed by: SURGERY

## 2018-11-07 PROCEDURE — 99999 PR PBB SHADOW E&M-EST. PATIENT-LVL III: CPT | Mod: PBBFAC,,, | Performed by: SURGERY

## 2018-11-07 RX ORDER — SCOLOPAMINE TRANSDERMAL SYSTEM 1 MG/1
1 PATCH, EXTENDED RELEASE TRANSDERMAL
Qty: 24 PATCH | Refills: 0 | Status: ON HOLD | OUTPATIENT
Start: 2018-11-07 | End: 2019-10-14 | Stop reason: CLARIF

## 2018-11-07 NOTE — PROGRESS NOTES
HPI:  Ms Ortiz is s/p Open incisional hernia with mesh now s/p  IR drain placement for abdominal wall abscess.    Just finished Bactrim yesterday. She states drain has been putting out only about 1 oz/day but it hasn't been holding suction. Still complaining of lower abdominal pain as well as persistent nausea since surgery.      She is eating well.    Physical Exam   Constitutional: She is oriented to person, place, and time. She appears well-developed and well-nourished. No distress.   HENT:   Head: Normocephalic and atraumatic.   Cardiovascular: Normal rate and regular rhythm.   Pulmonary/Chest: Effort normal. No respiratory distress.   Abdominal:   Soft, appropriate TTP at drain site, remainder of abdomen benign  IR drain with thin serous fluid.   Neurological: She is alert and oriented to person, place, and time.       A/P    54 yo female s/p Open incisional hernia with mesh now s/p  IR drain placement for abdominal wall abscess.    - Drain removed today.   - CT scan of the abdomen in a week.   - Follow up after.   - will send scopolamine to pharmacy    Abby Meyers MD  General Surgery PGY V  Beeper: [phone]

## 2018-11-26 ENCOUNTER — TELEPHONE (OUTPATIENT)
Dept: SURGERY | Facility: CLINIC | Age: 53
End: 2018-11-26

## 2018-11-26 NOTE — TELEPHONE ENCOUNTER
----- Message from Nichelle Baker sent at 11/26/2018  8:49 AM CST -----  Contact: Pt  Message for MA/RN/Provider     Who called: Pt  Message: Calling to speak with a nurse in regards to seeing if they received the results from CT scan was supposed to have been mailed in.   Contact preferences: [phone]  Additional Information: N/A

## 2018-11-26 NOTE — TELEPHONE ENCOUNTER
Pt notified after Dr. Baker's review of CT Scan from OSH:  Persistent but smaller abdominal wall fluid collection noted.  She has occasional pain at her surgery site and nausea that resolves on its own.  She will call back with worsening abdominal pain or nausea.  She verbalized understanding and is agreeable to this plan of care.

## 2018-12-28 ENCOUNTER — TELEPHONE (OUTPATIENT)
Dept: SURGERY | Facility: CLINIC | Age: 53
End: 2018-12-28

## 2018-12-28 NOTE — TELEPHONE ENCOUNTER
----- Message from Memo Cortes sent at 12/28/2018  9:24 AM CST -----  Needs Advice    Pam,    Communication Preference: [phone]    Additional Information:Pt states she need to speak with you to find out what her restrictions are if any.

## 2018-12-28 NOTE — TELEPHONE ENCOUNTER
Pt called regarding restrictions after her colostomy takedown and post-op abscess.  She has no activity restrictions.  She will also be FAXing Disability Paperwork for completion.  She verbalized understanding of activity as tolerated and that she has no restrictions.

## 2019-01-10 NOTE — PHYSICIAN QUERY
PT Name: Barbara Ortiz  MR #: [id number]    Physician Query Form - Cause and Effect Relationship Clarification      CDS/: Pam Billings               Contact information: [email or web address]    This form is a permanent document in the medical record.     Query Date: October 24, 2018    By submitting this query, we are merely seeking further clarification of documentation. Please utilize your independent clinical judgment when addressing the question(s) below.    The Medical record contains the following:  Supporting Clinical Findings   Location in record   History of diverticulitis s/p resection followed by colostomy reversal 04/2018 as well as incisional hernia repair with mesh 09/2018 presenting today with 4-5 days of moderate-severe stabbing right-sided abdominal pain     CT showed [id number] intra-abdominal fluid and air collection posterior to rectus but anterior to mesh                                                                                                                                                                                    H&P 10/23       Admitted given large abscess following hernia repair 9/11     NPO today in attempts to get IR drainage of fluid collection                                                                                                                                                                                       General Surgery PN 10/24         Provider, please clarify if there is any correlation between ____Abscess___ and __Hernia repair with mesh__.           Are the conditions:     [X  ] Due to or associated with each other     [  ] Unrelated to each other     [  ] Other (Please Specify): _________________________     [  ] Clinically Undetermined                                                                                                                                                                                               normal...

## 2019-01-17 ENCOUNTER — TELEPHONE (OUTPATIENT)
Dept: SURGERY | Facility: CLINIC | Age: 54
End: 2019-01-17

## 2019-01-17 NOTE — TELEPHONE ENCOUNTER
Pt called with c/o abdominal pain and nausea without vomiting.  She is s/p Incisional Hernia Repair from 2018 complicated by PO Abdominal wall abscess that required IR Drain placement.  She does not want to go to the ER as her insurance has  and she will have insurance beginning next month.  She feels just like she did before her hernia repair.  She will go to the ER with continued abdominal pain, nausea/vomiting and inability to pass gas.  She verbalized understanding.

## 2019-01-24 ENCOUNTER — TELEPHONE (OUTPATIENT)
Dept: SURGERY | Facility: CLINIC | Age: 54
End: 2019-01-24

## 2019-01-24 NOTE — TELEPHONE ENCOUNTER
Disability Paperwork not received by this office.  The paperwork will be reFAXed.  Number given.

## 2019-01-24 NOTE — TELEPHONE ENCOUNTER
----- Message from Lavon Gannon sent at 1/24/2019  1:05 PM CST -----  Contact: [employer]ody[employer]  Please call Jody at [phone]  Fax# [phone]    Please complete the disability form that was faxed over on 01/18/19    Thank you

## 2019-01-28 ENCOUNTER — HOSPITAL ENCOUNTER (INPATIENT)
Facility: HOSPITAL | Age: 54
LOS: 3 days | Discharge: HOME | End: 2019-01-31
Attending: SURGERY | Admitting: SURGERY
Payer: MEDICAID

## 2019-01-28 DIAGNOSIS — L02.211 ABDOMINAL WALL ABSCESS: Primary | ICD-10-CM

## 2019-01-28 PROCEDURE — 83605 ASSAY OF LACTIC ACID: CPT

## 2019-01-28 PROCEDURE — 83735 ASSAY OF MAGNESIUM: CPT

## 2019-01-28 PROCEDURE — 85025 COMPLETE CBC W/AUTO DIFF WBC: CPT

## 2019-01-28 PROCEDURE — 36415 COLL VENOUS BLD VENIPUNCTURE: CPT

## 2019-01-28 PROCEDURE — 11000001 HC ACUTE MED/SURG PRIVATE ROOM

## 2019-01-28 PROCEDURE — 84100 ASSAY OF PHOSPHORUS: CPT

## 2019-01-28 PROCEDURE — 80053 COMPREHEN METABOLIC PANEL: CPT

## 2019-01-28 RX ORDER — SODIUM CHLORIDE, SODIUM LACTATE, POTASSIUM CHLORIDE, CALCIUM CHLORIDE 600; 310; 30; 20 MG/100ML; MG/100ML; MG/100ML; MG/100ML
INJECTION, SOLUTION INTRAVENOUS CONTINUOUS
Status: DISCONTINUED | OUTPATIENT
Start: 2019-01-29 | End: 2019-01-30

## 2019-01-28 RX ORDER — PANTOPRAZOLE SODIUM 40 MG/10ML
40 INJECTION, POWDER, LYOPHILIZED, FOR SOLUTION INTRAVENOUS DAILY
Status: DISCONTINUED | OUTPATIENT
Start: 2019-01-29 | End: 2019-01-31 | Stop reason: HOSPADM

## 2019-01-28 RX ORDER — HYDROMORPHONE HYDROCHLORIDE 1 MG/ML
0.5 INJECTION, SOLUTION INTRAMUSCULAR; INTRAVENOUS; SUBCUTANEOUS EVERY 4 HOURS PRN
Status: DISCONTINUED | OUTPATIENT
Start: 2019-01-29 | End: 2019-01-31 | Stop reason: HOSPADM

## 2019-01-28 RX ORDER — SODIUM CHLORIDE 0.9 % (FLUSH) 0.9 %
3 SYRINGE (ML) INJECTION
Status: DISCONTINUED | OUTPATIENT
Start: 2019-01-29 | End: 2019-01-31 | Stop reason: HOSPADM

## 2019-01-28 RX ORDER — ONDANSETRON 2 MG/ML
4 INJECTION INTRAMUSCULAR; INTRAVENOUS EVERY 6 HOURS PRN
Status: DISCONTINUED | OUTPATIENT
Start: 2019-01-29 | End: 2019-01-31 | Stop reason: HOSPADM

## 2019-01-28 RX ADMIN — HYDROMORPHONE HYDROCHLORIDE 0.5 MG: 1 INJECTION, SOLUTION INTRAMUSCULAR; INTRAVENOUS; SUBCUTANEOUS at 11:01

## 2019-01-29 LAB
ALBUMIN SERPL BCP-MCNC: 3.6 G/DL
ALP SERPL-CCNC: 108 U/L
ALT SERPL W/O P-5'-P-CCNC: 9 U/L
ANION GAP SERPL CALC-SCNC: 9 MMOL/L
AST SERPL-CCNC: 16 U/L
BASOPHILS # BLD AUTO: 0.05 K/UL
BASOPHILS NFR BLD: 0.6 %
BILIRUB SERPL-MCNC: 1.1 MG/DL
BUN SERPL-MCNC: 13 MG/DL
CALCIUM SERPL-MCNC: 9.6 MG/DL
CHLORIDE SERPL-SCNC: 102 MMOL/L
CO2 SERPL-SCNC: 25 MMOL/L
CREAT SERPL-MCNC: 0.9 MG/DL
DIFFERENTIAL METHOD: ABNORMAL
EOSINOPHIL # BLD AUTO: 0.2 K/UL
EOSINOPHIL NFR BLD: 2.1 %
ERYTHROCYTE [DISTWIDTH] IN BLOOD BY AUTOMATED COUNT: 15.5 %
EST. GFR  (AFRICAN AMERICAN): >60 ML/MIN/1.73 M^2
EST. GFR  (NON AFRICAN AMERICAN): >60 ML/MIN/1.73 M^2
GLUCOSE SERPL-MCNC: 103 MG/DL
HCT VFR BLD AUTO: 37.1 %
HGB BLD-MCNC: 12.2 G/DL
IMM GRANULOCYTES # BLD AUTO: 0.02 K/UL
IMM GRANULOCYTES NFR BLD AUTO: 0.2 %
LACTATE SERPL-SCNC: 0.8 MMOL/L
LYMPHOCYTES # BLD AUTO: 2.1 K/UL
LYMPHOCYTES NFR BLD: 23.9 %
MAGNESIUM SERPL-MCNC: 1.9 MG/DL
MCH RBC QN AUTO: 27.5 PG
MCHC RBC AUTO-ENTMCNC: 32.9 G/DL
MCV RBC AUTO: 84 FL
MONOCYTES # BLD AUTO: 0.6 K/UL
MONOCYTES NFR BLD: 7.1 %
NEUTROPHILS # BLD AUTO: 5.8 K/UL
NEUTROPHILS NFR BLD: 66.1 %
NRBC BLD-RTO: 0 /100 WBC
PHOSPHATE SERPL-MCNC: 3.8 MG/DL
PLATELET # BLD AUTO: 276 K/UL
PMV BLD AUTO: 9.3 FL
POTASSIUM SERPL-SCNC: 4 MMOL/L
PROT SERPL-MCNC: 8.6 G/DL
RBC # BLD AUTO: 4.44 M/UL
SODIUM SERPL-SCNC: 136 MMOL/L
WBC # BLD AUTO: 8.76 K/UL

## 2019-01-29 PROCEDURE — 11000001 HC ACUTE MED/SURG PRIVATE ROOM

## 2019-01-29 PROCEDURE — 25000003 PHARM REV CODE 250: Performed by: STUDENT IN AN ORGANIZED HEALTH CARE EDUCATION/TRAINING PROGRAM

## 2019-01-29 PROCEDURE — 99222 PR INITIAL HOSPITAL CARE,LEVL II: ICD-10-PCS | Mod: ,,, | Performed by: NURSE PRACTITIONER

## 2019-01-29 PROCEDURE — 63600175 PHARM REV CODE 636 W HCPCS: Performed by: RADIOLOGY

## 2019-01-29 PROCEDURE — 25500020 PHARM REV CODE 255: Performed by: SURGERY

## 2019-01-29 PROCEDURE — 99222 1ST HOSP IP/OBS MODERATE 55: CPT | Mod: ,,, | Performed by: NURSE PRACTITIONER

## 2019-01-29 PROCEDURE — 25000003 PHARM REV CODE 250: Performed by: SURGERY

## 2019-01-29 PROCEDURE — C9113 INJ PANTOPRAZOLE SODIUM, VIA: HCPCS | Performed by: STUDENT IN AN ORGANIZED HEALTH CARE EDUCATION/TRAINING PROGRAM

## 2019-01-29 PROCEDURE — 63600175 PHARM REV CODE 636 W HCPCS: Performed by: STUDENT IN AN ORGANIZED HEALTH CARE EDUCATION/TRAINING PROGRAM

## 2019-01-29 RX ORDER — MIDAZOLAM HYDROCHLORIDE 1 MG/ML
INJECTION INTRAMUSCULAR; INTRAVENOUS CODE/TRAUMA/SEDATION MEDICATION
Status: COMPLETED | OUTPATIENT
Start: 2019-01-29 | End: 2019-01-29

## 2019-01-29 RX ORDER — DIPHENHYDRAMINE HCL 25 MG
25 CAPSULE ORAL EVERY 6 HOURS PRN
Status: DISCONTINUED | OUTPATIENT
Start: 2019-01-29 | End: 2019-01-31 | Stop reason: HOSPADM

## 2019-01-29 RX ORDER — SULFAMETHOXAZOLE AND TRIMETHOPRIM 800; 160 MG/1; MG/1
1 TABLET ORAL 2 TIMES DAILY
Status: DISCONTINUED | OUTPATIENT
Start: 2019-01-29 | End: 2019-01-31 | Stop reason: HOSPADM

## 2019-01-29 RX ORDER — CEFEPIME HYDROCHLORIDE 2 G/1
2 INJECTION, POWDER, FOR SOLUTION INTRAVENOUS
Status: DISCONTINUED | OUTPATIENT
Start: 2019-01-29 | End: 2019-01-29

## 2019-01-29 RX ORDER — VANCOMYCIN HCL IN 5 % DEXTROSE 1.5G/250ML
15 PLASTIC BAG, INJECTION (ML) INTRAVENOUS
Status: DISCONTINUED | OUTPATIENT
Start: 2019-01-29 | End: 2019-01-29

## 2019-01-29 RX ORDER — FENTANYL CITRATE 50 UG/ML
INJECTION, SOLUTION INTRAMUSCULAR; INTRAVENOUS CODE/TRAUMA/SEDATION MEDICATION
Status: COMPLETED | OUTPATIENT
Start: 2019-01-29 | End: 2019-01-29

## 2019-01-29 RX ADMIN — FENTANYL CITRATE 50 MCG: 50 INJECTION, SOLUTION INTRAMUSCULAR; INTRAVENOUS at 12:01

## 2019-01-29 RX ADMIN — MIDAZOLAM HYDROCHLORIDE 1 MG: 1 INJECTION, SOLUTION INTRAMUSCULAR; INTRAVENOUS at 11:01

## 2019-01-29 RX ADMIN — DIPHENHYDRAMINE HYDROCHLORIDE 25 MG: 25 CAPSULE ORAL at 10:01

## 2019-01-29 RX ADMIN — FENTANYL CITRATE 50 MCG: 50 INJECTION, SOLUTION INTRAMUSCULAR; INTRAVENOUS at 11:01

## 2019-01-29 RX ADMIN — PANTOPRAZOLE SODIUM 40 MG: 40 INJECTION, POWDER, FOR SOLUTION INTRAVENOUS at 03:01

## 2019-01-29 RX ADMIN — SULFAMETHOXAZOLE AND TRIMETHOPRIM 1 TABLET: 800; 160 TABLET ORAL at 03:01

## 2019-01-29 RX ADMIN — MIDAZOLAM HYDROCHLORIDE 1 MG: 1 INJECTION, SOLUTION INTRAMUSCULAR; INTRAVENOUS at 12:01

## 2019-01-29 RX ADMIN — IOHEXOL 100 ML: 350 INJECTION, SOLUTION INTRAVENOUS at 07:01

## 2019-01-29 RX ADMIN — SODIUM CHLORIDE, SODIUM LACTATE, POTASSIUM CHLORIDE, AND CALCIUM CHLORIDE: .6; .31; .03; .02 INJECTION, SOLUTION INTRAVENOUS at 04:01

## 2019-01-29 RX ADMIN — SULFAMETHOXAZOLE AND TRIMETHOPRIM 1 TABLET: 800; 160 TABLET ORAL at 10:01

## 2019-01-29 NOTE — PLAN OF CARE
Patient lives w/friend in a 1 story house, w/3 DARREN, w/rigoberto. Patient is independent & agile. No needs determined.     [facility] Packet given to patient after informed about it;patient verbalized their understanding.        01/29/19 0840   Discharge Assessment   Assessment Type Discharge Planning Assessment   Confirmed/corrected address and phone number on facesheet? Yes   Assessment information obtained from? Patient;Medical Record   Expected Length of Stay (days) (2)   Communicated expected length of stay with patient/caregiver no  (Per MD)   Prior to hospitilization cognitive status: Alert/Oriented;No Deficits   Prior to hospitalization functional status: Independent   Current cognitive status: Alert/Oriented;No Deficits   Current Functional Status: Independent   Facility Arrived From: ([facility])   Lives With friend(s)   Able to Return to Prior Arrangements yes   Is patient able to care for self after discharge? Yes   Who are your caregiver(s) and their phone number(s)? (Sister & step-sister available to assist her as she needs: 1. Jessie Allred Sister     [phone], 2. Step-sister: Ramona no # given. And friend she lives with/no name nor # given. )   Patient's perception of discharge disposition home or selfcare   Readmission Within the Last 30 Days no previous admission in last 30 days   Patient currently being followed by outpatient case management? No   Patient currently receives any other outside agency services? No   Equipment Currently Used at Home none   Do you have any problems affording any of your prescribed medications? No   Is the patient taking medications as prescribed? yes   Does the patient have transportation home? Yes   Transportation Anticipated family or friend will provide   Dialysis Name and Scheduled days (N/A)   Does the patient receive services at the Coumadin Clinic? No   Discharge Plan A Home;Other  (w/friend)   Discharge Plan B Home;Other  (w/friend)    DME Needed Upon Discharge  none   Patient/Family in Agreement with Plan yes

## 2019-01-29 NOTE — HPI
Patient is a 53 y.o. female that is presenting from [facility] in [place], [place] as a transfer for an abdominal wall abscess. Her PMH includes diverticulitis, fibromyalgia, chronic back pain, hypertension. In 2017 the underwent sigmoid colectomy for severe diverticulitis. She was left with an end colostomy until April 2018 when she underwent colostomy reversal and incisional hernia repair with Dr. Baker. In September 2018 she underwent repeat incisional hernia repair with Samuel with mesh placement. In November 2018 she began to develop feelings of nausea, focal shooting abdominal pains with superficial burning. She presented as a readmission for an abdominal wall abscess, treated with IV Vanc/Cefepime and IR drain placement. She was shortly discharged with the drain and intervally had it removed in general surgery clinic. She had been doing well since that time.      Five days ago, she began to develop feelings of intermittent nausea, not associated with meals. No vomiting. She also began to develop a focal area of tenderness and induration on her anterior abdomen. The next day she reports that the center of the induration opened and purulent fluid escaped. Since this time she has had mild improvement in her pain. She denies any subjective fevers or chills. No loss of appetite.      Workup at the OSH showed a midline fluid collection in the anterior abdominal wall, dimensions 10.4 x 6.1 x 3.2 cm, superior portion extending toward the skin. Fluid stranding and dermal thickening consistent with cellulitis. No free air, no free fluid, and no focal fluid collection in the peritoneal cavity.

## 2019-01-29 NOTE — H&P
Inpatient Radiology Pre-procedure Note    History of Present Illness:  Barbara Ortiz is a 53 y.o. female with an anterior abdominal wall abscess s/p multiple abdominal surgeries who presents to IR for a drain placement.    Admission H&P reviewed.    Past Medical History:   Diagnosis Date    Anxiety     Diverticulitis     Fibromyalgia     Hypertension     Sleep apnea      Past Surgical History:   Procedure Laterality Date    CARPAL TUNNEL RELEASE Right     CHOLECYSTECTOMY      COLOSTOMY      COLOSTOMY CLOSURE  04/10/2018    COLOSTOMY-REVERSAL N/A 4/10/2018    [facility]    HERNIA REPAIR  04/10/2018    LUMBAR FUSION      PARTIAL HYSTERECTOMY  2002    REPAIR,HERNIA,INCISIONAL WITH MESH N/A 9/11/2018    Performed by [facility] at [facility] OR [address] [place] [address]    REPAIR-HERNIA-INCISIONAL-INITIAL Open N/A 4/10/2018    Performed by [facility] Baker [facility] at [facility] OR [address] [place] [address]       Review of Systems:   As documented in primary team H&P    Home Meds:   Prior to Admission medications    Medication Sig Start Date End Date Taking? Authorizing Provider   atenolol-chlorthalidone (TENORETIC) 100-25 mg per tablet Take 1 tablet by mouth.    Historical Provider, MD   docusate sodium (COLACE) 50 MG capsule Take 1 capsule (50 mg total) by mouth 2 (two) times daily. 4/17/18   Martha Cho PA-C   escitalopram oxalate (LEXAPRO) 20 MG tablet Take 20 mg by mouth.    Historical Provider, MD   estradiol (ESTRACE) 1 MG tablet Take 1 mg by mouth once daily.  8/25/18   Historical Provider, MD   fluconazole (DIFLUCAN) 150 MG Tab  7/26/18   Historical Provider, MD   gabapentin (NEURONTIN) 300 MG capsule Take 1 capsule (300 mg total) by mouth 3 (three) times daily. 4/26/18 4/26/19  Martha Cho PA-C   hydrOXYzine pamoate (VISTARIL) 25 MG Cap Take 25 mg by mouth every 8 (eight) hours as needed.     Historical Provider, MD   ibuprofen (ADVIL,MOTRIN) 800 MG tablet Take 1 tablet (800 mg  total) by mouth 3 (three) times daily. 9/13/18   GIO Orellana Jr., MD   lisinopril (PRINIVIL,ZESTRIL) 20 MG tablet Take 20 mg by mouth.    Historical Provider, MD   losartan (COZAAR) 100 MG tablet Take 0.5 tablets (50 mg total) by mouth every evening.  Patient taking differently: Take 100 mg by mouth once daily.  4/17/18   Fely Chahal MD   metroNIDAZOLE (FLAGYL) 500 MG tablet Take 500 mg by mouth. 7/6/17   Historical Provider, MD   neomycin (MYCIFRADIN) 500 mg Tab Take 1,000 mg by mouth. 7/6/17   Historical Provider, MD   omeprazole (PRILOSEC) 40 MG capsule Take 40 mg by mouth.    Historical Provider, MD   ondansetron (ZOFRAN) 8 MG tablet Take 8 mg by mouth every 8 (eight) hours as needed for Nausea.    Historical Provider, MD   oxyCODONE-acetaminophen (PERCOCET) 5-325 mg per tablet Take 1 tablet by mouth every 4 (four) hours as needed. 9/13/18   GIO Orellana Jr., MD   scopolamine (TRANSDERM-SCOP) 1.3-1.5 mg (1 mg over 3 days) Place 1 patch onto the skin every 72 hours. 11/7/18   Abby Ramirez MD   sulfamethoxazole-trimethoprim 800-160mg (BACTRIM DS) 800-160 mg Tab  9/17/18   Historical Provider, MD   traZODone (DESYREL) 50 MG tablet Take 50 mg by mouth nightly as needed.     Historical Provider, MD     Scheduled Meds:    pantoprazole  40 mg Intravenous Daily    sulfamethoxazole-trimethoprim 800-160mg  1 tablet Oral BID     Continuous Infusions:    lactated ringers 125 mL/hr at 01/29/19 0442     PRN Meds:HYDROmorphone, ondansetron, promethazine (PHENERGAN) IVPB, sodium chloride 0.9%  Anticoagulants/Antiplatelets: no anticoagulation    Allergies: Review of patient's allergies indicates:  No Known Allergies  Sedation Hx: have not been any systemic reactions    Labs:  No results for input(s): INR in the last 168 hours.    Invalid input(s):  PT,  PTT    Recent Labs   Lab 01/28/19  2326   WBC 8.76   HGB 12.2   HCT 37.1   MCV 84         Recent Labs   Lab 01/28/19  2326          K 4.0      CO2 25   BUN 13   CREATININE 0.9   CALCIUM 9.6   MG 1.9   ALT 9*   AST 16   ALBUMIN 3.6   BILITOT 1.1*         Vitals:  Temp: 98.2 °F (36.8 °C) (01/29/19 0800)  Pulse: 68 (01/29/19 0800)  Resp: 18 (01/29/19 0800)  BP: (!) 150/80 (01/29/19 0800)  SpO2: 95 % (01/29/19 0800)     Physical Exam:  ASA: 2  Mallampati: 2    General: no acute distress  Mental Status: alert and oriented to person, place and time  HEENT: normocephalic, atraumatic  Chest: unlabored breathing  Heart: regular heart rate  Abdomen: nondistended  Extremity: moves all extremities      Plan:  Sedation Plan: up to moderate  Patient will undergo an anterior abdominal wall abscess drain placement.      Lucia Garcia MD  Department of Radiology  Resident

## 2019-01-29 NOTE — CONSULTS
Consult Note  Interventional Radiology    Consult Requested By: General Surgery    Reason for Consult: Abdominal Wall Abscess    Consults    SUBJECTIVE:     Chief Complaint: Abdominal pain    History of Present Illness: Patient is a 53 y.o. transfered for an abdominal wall abscess from [facility]. Pt. W/ pmhx of diverticulitis, fibromyalgia, chronic back pain, hypertension. Pshx includes sigmoid colectomy in 2017 for severe diverticulitis, w/ end colostomy until April 2018, at which point she had a reversal and hernia repair She underwent repeat incisional hernia repair w;/ mesh in September 2018. More recently in November 2018 pt developed multiple episodes of nausea and shooting/burning abdominal pain. More recently she was readmitted for abdominal wall abscess, for which a drain was placed and removed with good resolution per outpatient team.    Patient began to re-develop episodes of nausea and felt are of tenderness and induration over epigastric area R> L (not associated with meals). 4 days ago, the center of the induration opened and she saw purulent discharge.Denies fevers, chills, changes in appetite, or vomitting. She went to OSH ED and workup demonstrated midline fluid collection in the anterior abdominal wall (10.4 x 6.1 x 3.2 cm) superior portion extending toward the skin and fluid stranding and dermal thickening consistent with cellulitis. General Surgery requested IR consult to evaluate anterior abdominal wall abscess.    Past Medical History:   Diagnosis Date    Anxiety     Diverticulitis     Fibromyalgia     Hypertension     Sleep apnea      Past Surgical History:   Procedure Laterality Date    CARPAL TUNNEL RELEASE Right     CHOLECYSTECTOMY      COLOSTOMY      COLOSTOMY CLOSURE  04/10/2018    COLOSTOMY-REVERSAL N/A 4/10/2018    Performed by [facility] at [facility] OR [address] [place] [address]    HERNIA REPAIR  04/10/2018    LUMBAR FUSION      PARTIAL HYSTERECTOMY  2002    REPAIR,HERNIA,INCISIONAL  WITH MESH N/A 9/11/2018    [facility] at [facility] OR [address] [place] [address]    REPAIR-HERNIA-INCISIONAL-INITIAL Open N/A 4/10/2018    Performed by [facility]o Baker [facility] at [facility] OR [address] [place] [address]     No family history on file.  Social History     Tobacco Use    Smoking status: Never Smoker    Smokeless tobacco: Never Used   Substance Use Topics    Alcohol use: Yes     Comment: occasionally    Drug use: No       Current Facility-Administered Medications:     ceFEPIme injection 2 g, 2 g, Intravenous, Q12H, Maria Alejandra Lafleur MD, Stopped at 01/29/19 0441    HYDROmorphone injection 0.5 mg, 0.5 mg, Intravenous, Q4H PRN, Maria Alejandra Lafleur MD, 0.5 mg at 01/28/19 2323    lactated ringers infusion, , Intravenous, Continuous, Maria Alejandra Lafleur MD, Last Rate: 125 mL/hr at 01/29/19 0442    ondansetron injection 4 mg, 4 mg, Intravenous, Q6H PRN, Maria Alejandra Lafleur MD    pantoprazole injection 40 mg, 40 mg, Intravenous, Daily, Maria Alejandra Lafleur MD    promethazine (PHENERGAN) 6.25 mg in dextrose 5 % 50 mL IVPB, 6.25 mg, Intravenous, Q6H PRN, Maria Alejandra Lafleur MD    sodium chloride 0.9% flush 3 mL, 3 mL, Intravenous, PRN, Maria Alejandra Lafleur MD    vancomycin 1.5 g in 5 % dextrose 250 mL IVPB, 15 mg/kg, Intravenous, Q12H, Maria Alejandra Lafleur MD    Review of patient's allergies indicates:  No Known Allergies     Review of Systems:  Constitutional/General:No fever, chills, change in appetite or weight loss.  Eyes: negative for blurry vision, decreased vision, photophobia and redness   ENT: no sore throat, ear pain, or symptoms suggestive of sinusitis  Hematological/Immuno: negative for - bleeding problems, blood clots, blood transfusions, bruising or pallor  no known coagulopathies  Respiratory: negative for - cough, hemoptysis, pleuritic pain, shortness of breath or wheezing  no shortness of breath  Cardiovascular: negative for - chest pain, palpitations or shortness of  breath  no chest pain  Gastrointestinal: positive for - abdominal pain and nausea/vomiting  Genito-Urinary: no dysuria, trouble voiding, or hematuria  negative for - change in urinary stream, dysuria, hematuria or incontinence  no dysuria  Musculoskeletal: negative for - joint swelling, muscle pain or muscular weakness  Skin: Negative for rash, itching, pigmentation changes, nail or hair changes.  Neurological: no TIA or stroke symptoms  Psychiatric: normal mood/affect, good insight/judgement  Endo:no signs suggestive of diabetes, thyroid disease, or other endocrine disorders    OBJECTIVE:     Vital Signs Range (Last 24H):  Temp:  [98.3 °F (36.8 °C)-98.7 °F (37.1 °C)]   Pulse:  [58-71]   Resp:  [16]   BP: (119-163)/(78-87)   SpO2:  [98 %-100 %]     Physical Exam:  General- Patient alert and oriented x3 in NAD  Eyes-  ENT- PERRLA, EOMI, OP clear, MMM  Neck- No JVD, Lymphadenopathy, Thyromegaly  CV- Regular rate and rhythm, No Murmur/charles/rubs  Resp- Lungs CTA Bilaterally, No increased WOB  GI- Non tender/non-distended, BS normoactive x4 quads, no HSM  Extrem- No cyanosis, clubbing, edema. Pulses 2+ and symmetric  Derm- No rashes, masses, or lesions noted  Neuro-  DTRs 2+ and symmetric. Strength 5/5 flexors and extensors with intact sensation. No focal deficits noted.     Physical Exam   Constitutional: She is oriented to person, place, and time. She appears well-developed.   Neck: No JVD present. No tracheal deviation present.   Cardiovascular: Normal rate and regular rhythm.   Pulmonary/Chest: Breath sounds normal. No respiratory distress.   Abdominal: Tenderness over midline incision. Demonstrates guarding around midline epigastric region.       Obese abdomen with abdominal stretch marks   Musculoskeletal: She exhibits no edema.   Neurological: She is alert and oriented to person, place, and time.   Skin: Skin is warm and dry.            Physical Exam  Body mass index is 35.35 kg/m².    Laboratory:  CBC:   Recent  Labs   Lab 01/28/19  2326   WBC 8.76   RBC 4.44   HGB 12.2   HCT 37.1      MCV 84   MCH 27.5   MCHC 32.9     BMP:   Recent Labs   Lab 01/28/19  2326         K 4.0      CO2 25   BUN 13   CREATININE 0.9   CALCIUM 9.6   MG 1.9     CMP:   Recent Labs   Lab 01/28/19  2326      CALCIUM 9.6   ALBUMIN 3.6   PROT 8.6*      K 4.0   CO2 25      BUN 13   CREATININE 0.9   ALKPHOS 108   ALT 9*   AST 16   BILITOT 1.1*     LFTs:   Recent Labs   Lab 01/28/19  2326   ALT 9*   AST 16   ALKPHOS 108   BILITOT 1.1*   PROT 8.6*   ALBUMIN 3.6     Coagulation: No results for input(s): LABPROT, INR, APTT in the last 168 hours.  Cardiac markers: No results for input(s): CKMB, CPKMB, TROPONINT, TROPONINI, MYOGLOBIN in the last 168 hours.  ABGs: No results for input(s): PH, PCO2, PO2, HCO3, POCSATURATED, BE in the last 168 hours.  Microbiology Results (last 7 days)     ** No results found for the last 168 hours. **        Specimen (12h ago, onward)    None        No results for input(s): COLORU, CLARITYU, SPECGRAV, PHUR, PROTEINUA, GLUCOSEU, BILIRUBINCON, BLOODU, WBCU, RBCU, BACTERIA, MUCUS, NITRITE, LEUKOCYTESUR, UROBILINOGEN, HYALINECASTS in the last 168 hours.    Diagnostic Results:  CT Abdomen:  0.4cm anterior abdominal fluid cavity, consistent with abscess and associated overlying cellulitis.       ASSESSMENT/PLAN:     Active Hospital Problems    Diagnosis  POA    Abdominal wall abscess [L02.211]  Yes     Assessment:    54 yo female who presented with abdominal wall abscess recurrence.  Patient exhibits tenderness and induration over midline epigastric region, which several days ago, patient reports seeing purulent discharge through. She reports recent history of nausea however denies vomiting episodes. She is hemodynamically stable, afebrile, and denies fevers/chills/changes in appetite at this time.    Plan:    -Keep NPO   -Plan for Abscess Drain Placement in Interventional  Radiology  -Patient was educated on procedure, benefits, and risks and would like to proceed  -This case was discussed and all pertinent imaging was reviewed with Dr. Gerardo Thrasher, who agrees with/approves the above plan of care.    Thank you for consulting Interventional Radiology. Please call with any questions.     Evette Bird DNP , FNP-BC  Nurse Practitioner  Interventional Radiology  [phone]

## 2019-01-29 NOTE — H&P
[facility]  General Surgery  History & Physical    Patient Name: Barbara Ortiz  MRN: [id number]  Admission Date: 1/28/2019  Attending Physician: Iftikhar Linton MD   Primary Care Provider: Noah Cordero MD    Patient information was obtained from patient and ER records.     Subjective:     Chief Complaint/Reason for Admission: Abdominal wall abscess    History of Present Illness:  Patient is a 53 y.o. female that is presenting from [facility] in [place], [place] as a transfer for an abdominal wall abscess. Her PMH includes diverticulitis, fibromyalgia, chronic back pain, hypertension. In 2017 the underwent sigmoid colectomy for severe diverticulitis. She was left with an end colostomy until April 2018 when she underwent colostomy reversal and incisional hernia repair with Dr. Baker. In September 2018 she underwent repeat incisional hernia repair with [facility] with mesh placement. In November 2018 she began to develop feelings of nausea, focal shooting abdominal pains with superficial burning. She presented as a readmission for an abdominal wall abscess, treated with IV Vanc/Cefepime and IR drain placement. She was shortly discharged with the drain and intervally had it removed in general surgery clinic. She had been doing well since that time.     Five days ago, she began to develop feelings of intermittent nausea, not associated with meals. No vomiting. She also began to develop a focal area of tenderness and induration on her anterior abdomen. The next day she reports that the center of the induration opened and purulent fluid escaped. Since this time she has had mild improvement in her pain. She denies any subjective fevers or chills. No loss of appetite.     Workup at the OSH showed a midline fluid collection in the anterior abdominal wall, dimensions 10.4 x 6.1 x 3.2 cm, superior portion extending toward the skin. Fluid stranding and dermal thickening consistent with cellulitis. No free  air, no free fluid, and no focal fluid collection in the peritoneal cavity.     No current facility-administered medications on file prior to encounter.      Current Outpatient Medications on File Prior to Encounter   Medication Sig    atenolol-chlorthalidone (TENORETIC) 100-25 mg per tablet Take 1 tablet by mouth.    docusate sodium (COLACE) 50 MG capsule Take 1 capsule (50 mg total) by mouth 2 (two) times daily.    escitalopram oxalate (LEXAPRO) 20 MG tablet Take 20 mg by mouth.    estradiol (ESTRACE) 1 MG tablet Take 1 mg by mouth once daily.     fluconazole (DIFLUCAN) 150 MG Tab     gabapentin (NEURONTIN) 300 MG capsule Take 1 capsule (300 mg total) by mouth 3 (three) times daily.    hydrOXYzine pamoate (VISTARIL) 25 MG Cap Take 25 mg by mouth every 8 (eight) hours as needed.     ibuprofen (ADVIL,MOTRIN) 800 MG tablet Take 1 tablet (800 mg total) by mouth 3 (three) times daily.    lisinopril (PRINIVIL,ZESTRIL) 20 MG tablet Take 20 mg by mouth.    losartan (COZAAR) 100 MG tablet Take 0.5 tablets (50 mg total) by mouth every evening. (Patient taking differently: Take 100 mg by mouth once daily. )    metroNIDAZOLE (FLAGYL) 500 MG tablet Take 500 mg by mouth.    neomycin (MYCIFRADIN) 500 mg Tab Take 1,000 mg by mouth.    omeprazole (PRILOSEC) 40 MG capsule Take 40 mg by mouth.    ondansetron (ZOFRAN) 8 MG tablet Take 8 mg by mouth every 8 (eight) hours as needed for Nausea.    oxyCODONE-acetaminophen (PERCOCET) 5-325 mg per tablet Take 1 tablet by mouth every 4 (four) hours as needed.    scopolamine (TRANSDERM-SCOP) 1.3-1.5 mg (1 mg over 3 days) Place 1 patch onto the skin every 72 hours.    sulfamethoxazole-trimethoprim 800-160mg (BACTRIM DS) 800-160 mg Tab     traZODone (DESYREL) 50 MG tablet Take 50 mg by mouth nightly as needed.        Review of patient's allergies indicates:  No Known Allergies    Past Medical History:   Diagnosis Date    Anxiety     Diverticulitis     Fibromyalgia      Hypertension     Sleep apnea      Past Surgical History:   Procedure Laterality Date    CARPAL TUNNEL RELEASE Right     CHOLECYSTECTOMY      COLOSTOMY      COLOSTOMY CLOSURE  04/10/2018    COLOSTOMY-REVERSAL N/A 4/10/2018    Performed by [facility] at [facility] OR [address] [place] [address]    HERNIA REPAIR  04/10/2018    LUMBAR FUSION      PARTIAL HYSTERECTOMY  2002    REPAIR,HERNIA,INCISIONAL WITH MESH N/A 9/11/2018    Performed by [facility]o [facility] MD at [facility] OR [address] [place] [address]    REPAIR-HERNIA-INCISIONAL-INITIAL Open N/A 4/10/2018    Performed by Teo Baker MD at [facility] OR [address] [place] [address]     Family History     None        Tobacco Use    Smoking status: Never Smoker    Smokeless tobacco: Never Used   Substance and Sexual Activity    Alcohol use: Yes     Comment: occasionally    Drug use: No    Sexual activity: Not on file     Review of Systems  Objective:     Vital Signs (Most Recent):  Temp: 98.7 °F (37.1 °C) (01/28/19 2305)  Pulse: 71 (01/28/19 2305)  Resp: 16 (01/28/19 2305)  BP: (!) 163/87 (01/28/19 2305)  SpO2: 98 % (01/28/19 2305) Vital Signs (24h Range):  Temp:  [98.3 °F (36.8 °C)-98.7 °F (37.1 °C)] 98.7 °F (37.1 °C)  Pulse:  [58-71] 71  Resp:  [16] 16  SpO2:  [98 %-100 %] 98 %  BP: (119-163)/(78-87) 163/87     Weight: 99.3 kg (219 lb)  Body mass index is 35.35 kg/m².    Physical Exam   Constitutional: She is oriented to person, place, and time. She appears well-developed.   Neck: No JVD present. No tracheal deviation present.   Cardiovascular: Normal rate and regular rhythm.   Pulmonary/Chest: Breath sounds normal. No respiratory distress.   Abdominal: Soft. She exhibits no distension and no mass. There is no tenderness. There is no rebound and no guarding.       Obese abdomen with abdominal stretch marks   Musculoskeletal: She exhibits no edema.   Neurological: She is alert and oriented to person, place, and time.   Skin: Skin is warm and dry.       Significant Labs:  CBC:   Recent Labs   Lab  01/28/19 2326   WBC 8.76   RBC 4.44   HGB 12.2   HCT 37.1      MCV 84   MCH 27.5   MCHC 32.9     CMP:   Recent Labs   Lab 01/28/19 2326      CALCIUM 9.6   ALBUMIN 3.6   PROT 8.6*      K 4.0   CO2 25      BUN 13   CREATININE 0.9   ALKPHOS 108   ALT 9*   AST 16   BILITOT 1.1*       Significant Diagnostics:  CT: I have reviewed all pertinent results/findings within the past 24 hours and my personal findings are:  10.4cm anterior abdominal fluid cavity, consistent with abscess and associated overlying cellulitis.     Assessment/Plan:     Active Diagnoses:    Diagnosis Date Noted POA    Abdominal wall abscess [L02.211] 10/23/2018 Yes      Problems Resolved During this Admission:     VTE Risk Mitigation (From admission, onward)        Ordered     IP VTE HIGH RISK PATIENT  Once      01/28/19 2306     Place sequential compression device  Until discontinued      01/28/19 2306        Assessment and Plan:  Patient is a 54yo female that presents with abdominal wall abscess. Remains hemodynamically stable, afebrile, and without leukocytosis.     - NPO   - mIVF at 125cc/hr  - f/u STAT labs, daily labs  - Disc with CT scan on patient chart  - Start Vanc and Cefepime for broad antibiotic coverage  - IV pain and nausea control  - Will likely require IR abscess drainage during this admission  - Will discuss with primary team in the morning      Maria Alejandra Lafleur MD  General Surgery  [facility]

## 2019-01-29 NOTE — PROCEDURES
"Radiology Post-Procedure Note    Pre Op Diagnosis: Abdominal wall fluid collection  Post Op Diagnosis: Same    Procedure: Abdominal wall fluid aspiration    Procedure performed by: Sherita    Written Informed Consent Obtained: Yes  Specimen Removed: NO  Estimated Blood Loss: Minimal    Findings:   Under US and Fluoroscopic guidance an 18g needle was placed into the abdominal wall fluid collection. A 3D spin was then obtained confirming placement. No fluid was able to be aspirated. FLuid appears to reflect edema within subcutaneous tissues rather than abscess. No drain was placed.     Patient tolerated procedure well.    Javi Dukes MD ([facility])  Radiology PGY-5  [phone]      "

## 2019-01-30 LAB
ANION GAP SERPL CALC-SCNC: 8 MMOL/L
BASOPHILS # BLD AUTO: 0.06 K/UL
BASOPHILS NFR BLD: 0.7 %
BUN SERPL-MCNC: 14 MG/DL
CA-I BLDV-SCNC: 1.09 MMOL/L
CALCIUM SERPL-MCNC: 9.6 MG/DL
CHLORIDE SERPL-SCNC: 102 MMOL/L
CO2 SERPL-SCNC: 26 MMOL/L
CREAT SERPL-MCNC: 0.9 MG/DL
DIFFERENTIAL METHOD: ABNORMAL
EOSINOPHIL # BLD AUTO: 0.2 K/UL
EOSINOPHIL NFR BLD: 2 %
ERYTHROCYTE [DISTWIDTH] IN BLOOD BY AUTOMATED COUNT: 15.3 %
EST. GFR  (AFRICAN AMERICAN): >60 ML/MIN/1.73 M^2
EST. GFR  (NON AFRICAN AMERICAN): >60 ML/MIN/1.73 M^2
GLUCOSE SERPL-MCNC: 114 MG/DL
GRAM STN SPEC: NORMAL
GRAM STN SPEC: NORMAL
HCT VFR BLD AUTO: 39.8 %
HGB BLD-MCNC: 12.6 G/DL
IMM GRANULOCYTES # BLD AUTO: 0.02 K/UL
IMM GRANULOCYTES NFR BLD AUTO: 0.2 %
LYMPHOCYTES # BLD AUTO: 2 K/UL
LYMPHOCYTES NFR BLD: 24.7 %
MAGNESIUM SERPL-MCNC: 2.2 MG/DL
MCH RBC QN AUTO: 27.4 PG
MCHC RBC AUTO-ENTMCNC: 31.7 G/DL
MCV RBC AUTO: 87 FL
MONOCYTES # BLD AUTO: 0.7 K/UL
MONOCYTES NFR BLD: 8.4 %
NEUTROPHILS # BLD AUTO: 5.2 K/UL
NEUTROPHILS NFR BLD: 64 %
NRBC BLD-RTO: 0 /100 WBC
PHOSPHATE SERPL-MCNC: 3.9 MG/DL
PLATELET # BLD AUTO: 272 K/UL
PMV BLD AUTO: 10 FL
POTASSIUM SERPL-SCNC: 4.2 MMOL/L
RBC # BLD AUTO: 4.6 M/UL
SODIUM SERPL-SCNC: 136 MMOL/L
WBC # BLD AUTO: 8.1 K/UL

## 2019-01-30 PROCEDURE — 11000001 HC ACUTE MED/SURG PRIVATE ROOM

## 2019-01-30 PROCEDURE — 87075 CULTR BACTERIA EXCEPT BLOOD: CPT

## 2019-01-30 PROCEDURE — 87070 CULTURE OTHR SPECIMN AEROBIC: CPT

## 2019-01-30 PROCEDURE — 84100 ASSAY OF PHOSPHORUS: CPT

## 2019-01-30 PROCEDURE — 82330 ASSAY OF CALCIUM: CPT

## 2019-01-30 PROCEDURE — 63600175 PHARM REV CODE 636 W HCPCS: Performed by: RADIOLOGY

## 2019-01-30 PROCEDURE — C9113 INJ PANTOPRAZOLE SODIUM, VIA: HCPCS | Performed by: STUDENT IN AN ORGANIZED HEALTH CARE EDUCATION/TRAINING PROGRAM

## 2019-01-30 PROCEDURE — 63600175 PHARM REV CODE 636 W HCPCS: Performed by: STUDENT IN AN ORGANIZED HEALTH CARE EDUCATION/TRAINING PROGRAM

## 2019-01-30 PROCEDURE — 87206 SMEAR FLUORESCENT/ACID STAI: CPT

## 2019-01-30 PROCEDURE — 87186 SC STD MICRODIL/AGAR DIL: CPT

## 2019-01-30 PROCEDURE — 25000003 PHARM REV CODE 250: Performed by: SURGERY

## 2019-01-30 PROCEDURE — 80048 BASIC METABOLIC PNL TOTAL CA: CPT

## 2019-01-30 PROCEDURE — 85025 COMPLETE CBC W/AUTO DIFF WBC: CPT

## 2019-01-30 PROCEDURE — 36415 COLL VENOUS BLD VENIPUNCTURE: CPT

## 2019-01-30 PROCEDURE — 87205 SMEAR GRAM STAIN: CPT

## 2019-01-30 PROCEDURE — 87077 CULTURE AEROBIC IDENTIFY: CPT

## 2019-01-30 PROCEDURE — 83735 ASSAY OF MAGNESIUM: CPT

## 2019-01-30 PROCEDURE — 99232 PR SUBSEQUENT HOSPITAL CARE,LEVL II: ICD-10-PCS | Mod: ,,, | Performed by: SURGERY

## 2019-01-30 PROCEDURE — 87102 FUNGUS ISOLATION CULTURE: CPT

## 2019-01-30 PROCEDURE — 99232 SBSQ HOSP IP/OBS MODERATE 35: CPT | Mod: ,,, | Performed by: SURGERY

## 2019-01-30 PROCEDURE — 87116 MYCOBACTERIA CULTURE: CPT

## 2019-01-30 RX ORDER — FENTANYL CITRATE 50 UG/ML
INJECTION, SOLUTION INTRAMUSCULAR; INTRAVENOUS CODE/TRAUMA/SEDATION MEDICATION
Status: COMPLETED | OUTPATIENT
Start: 2019-01-30 | End: 2019-01-30

## 2019-01-30 RX ORDER — MIDAZOLAM HYDROCHLORIDE 1 MG/ML
INJECTION INTRAMUSCULAR; INTRAVENOUS CODE/TRAUMA/SEDATION MEDICATION
Status: COMPLETED | OUTPATIENT
Start: 2019-01-30 | End: 2019-01-30

## 2019-01-30 RX ORDER — DIPHENHYDRAMINE HYDROCHLORIDE 50 MG/ML
INJECTION INTRAMUSCULAR; INTRAVENOUS CODE/TRAUMA/SEDATION MEDICATION
Status: COMPLETED | OUTPATIENT
Start: 2019-01-30 | End: 2019-01-30

## 2019-01-30 RX ADMIN — SULFAMETHOXAZOLE AND TRIMETHOPRIM 1 TABLET: 800; 160 TABLET ORAL at 10:01

## 2019-01-30 RX ADMIN — MIDAZOLAM HYDROCHLORIDE 1 MG: 1 INJECTION, SOLUTION INTRAMUSCULAR; INTRAVENOUS at 02:01

## 2019-01-30 RX ADMIN — FENTANYL CITRATE 50 MCG: 50 INJECTION, SOLUTION INTRAMUSCULAR; INTRAVENOUS at 02:01

## 2019-01-30 RX ADMIN — SULFAMETHOXAZOLE AND TRIMETHOPRIM 1 TABLET: 800; 160 TABLET ORAL at 08:01

## 2019-01-30 RX ADMIN — DIPHENHYDRAMINE HYDROCHLORIDE 50 MG: 50 INJECTION INTRAMUSCULAR; INTRAVENOUS at 03:01

## 2019-01-30 RX ADMIN — PANTOPRAZOLE SODIUM 40 MG: 40 INJECTION, POWDER, FOR SOLUTION INTRAVENOUS at 08:01

## 2019-01-30 RX ADMIN — DIPHENHYDRAMINE HYDROCHLORIDE 25 MG: 25 CAPSULE ORAL at 07:01

## 2019-01-30 RX ADMIN — DIPHENHYDRAMINE HYDROCHLORIDE 25 MG: 25 CAPSULE ORAL at 10:01

## 2019-01-30 NOTE — ASSESSMENT & PLAN NOTE
52 y/o F s/p Ventral hernia repair with mesh in Oct, c/b fluid collection s/p IR drain placement and outpatient ABx in Nov. Now with recurrent fluid collection deep to the mesh    - Discuss with IR again today for drain placement  - Keep NPO until we know if this can be done today  - mIVF  - PRN pain/antiemetics  - Keep on Bactrim

## 2019-01-30 NOTE — PROCEDURES
Interventional Radiology Post-Procedure Note    Pre Op Diagnosis: Fluid collection  Post Op Diagnosis: Same    Procedure: CT-guided drainage catheter placement    Procedure performed by: Jaime    Written Informed Consent Obtained: Yes  Specimen Removed: Yes  Estimated Blood Loss: Minimal    Findings:   Midline abdominal fluid collection re-identified. Challenging to advance a needle into the collection, likely 2/2 overlying mesh. Spoke to Enrique Portillo during the case. Surg is Ok with IR placing a drain through the mesh. Tract serially dilated to 10-Fr, with difficulty. Unable to place a 10-Fr drain, but was eventually able to place an 8-Fr drain through which 35 mL blood-tinged purulent material was aspirated.    No immediate complications. Patient tolerated procedure well. Please see full dictated procedure report for additional details.      Christiano Sandoval MD  [facility] IR  Pager [phone]

## 2019-01-30 NOTE — PROGRESS NOTES
8F abdominal wall drain placement completed, pt tolerated well. No apparent distress noted. Dressing applied CDI. Labs collected and sent. Report called to primary nurse. Pt to be transferred to [facility], report to be given at  Bedside.

## 2019-01-30 NOTE — SUBJECTIVE & OBJECTIVE
Interval History:   IR unable to drain with u/s yesterday. Repeat CT shows persistent fluid collection around the mesh.   Remains AFVSS. WBC normal. Minimal pain    Medications:  Continuous Infusions:   lactated ringers 125 mL/hr at 01/29/19 0442     Scheduled Meds:   pantoprazole  40 mg Intravenous Daily    sulfamethoxazole-trimethoprim 800-160mg  1 tablet Oral BID     PRN Meds:diphenhydrAMINE, HYDROmorphone, ondansetron, promethazine (PHENERGAN) IVPB, sodium chloride 0.9%     Review of patient's allergies indicates:  No Known Allergies  Objective:     Vital Signs (Most Recent):  Temp: 98.1 °F (36.7 °C) (01/30/19 0744)  Pulse: 75 (01/30/19 0744)  Resp: 14 (01/30/19 0744)  BP: 134/89 (01/30/19 0744)  SpO2: 98 % (01/30/19 0744) Vital Signs (24h Range):  Temp:  [97 °F (36.1 °C)-98.1 °F (36.7 °C)] 98.1 °F (36.7 °C)  Pulse:  [59-78] 75  Resp:  [11-20] 14  SpO2:  [96 %-100 %] 98 %  BP: (117-178)/() 134/89     Weight: 99.3 kg (219 lb)  Body mass index is 35.35 kg/m².    Intake/Output - Last 3 Shifts       01/28 0700 - 01/29 0659 01/29 0700 - 01/30 0659 01/30 0700 - 01/31 0659    P.O.  240     I.V. (mL/kg)  1162.5 (11.7)     Total Intake(mL/kg)  1402.5 (14.1)     Urine (mL/kg/hr)  0 (0)     Emesis/NG output  0     Other  0     Stool  0     Blood  0     Total Output  0     Net  +1402.5            Urine Occurrence  2 x     Stool Occurrence  0 x     Emesis Occurrence  0 x           Physical Exam  A&O, NAD  RRR  No Resp distress  ABD: obese, small area of drainage superior to the umbilicus with serous output, mildly TTP    Significant Labs:  CBC:   Recent Labs   Lab 01/30/19  0523   WBC 8.10   RBC 4.60   HGB 12.6   HCT 39.8      MCV 87   MCH 27.4   MCHC 31.7*       Significant Diagnostics:  I have reviewed all pertinent imaging results/findings within the past 24 hours.

## 2019-01-30 NOTE — H&P
Inpatient Radiology Pre-procedure Note    History of Present Illness:  Barbara Ortiz is a 53 y.o. female with an anterior abdominal wall abscess s/p multiple abdominal surgeries who presents to IR for a drain placement.    Admission H&P reviewed.    Past Medical History:   Diagnosis Date    Anxiety     Diverticulitis     Fibromyalgia     Hypertension     Sleep apnea      Past Surgical History:   Procedure Laterality Date    CARPAL TUNNEL RELEASE Right     CHOLECYSTECTOMY      COLOSTOMY      COLOSTOMY CLOSURE  04/10/2018    COLOSTOMY-REVERSAL N/A 4/10/2018    [facility]    HERNIA REPAIR  04/10/2018    LUMBAR FUSION      PARTIAL HYSTERECTOMY  2002    REPAIR,HERNIA,INCISIONAL WITH MESH N/A 9/11/2018    Performed by Teo Baker MD at [facility] OR [address] [place] [address]    REPAIR-HERNIA-INCISIONAL-INITIAL Open N/A 4/10/2018    Performed by Teo Baker MD at [facility] OR [address] [place] [address]       Review of Systems:   As documented in primary team H&P    Home Meds:   Prior to Admission medications    Medication Sig Start Date End Date Taking? Authorizing Provider   atenolol-chlorthalidone (TENORETIC) 100-25 mg per tablet Take 1 tablet by mouth.    Historical Provider, MD   docusate sodium (COLACE) 50 MG capsule Take 1 capsule (50 mg total) by mouth 2 (two) times daily. 4/17/18   Martha Cho PA-C   escitalopram oxalate (LEXAPRO) 20 MG tablet Take 20 mg by mouth.    Historical Provider, MD   estradiol (ESTRACE) 1 MG tablet Take 1 mg by mouth once daily.  8/25/18   Historical Provider, MD   fluconazole (DIFLUCAN) 150 MG Tab  7/26/18   Historical Provider, MD   gabapentin (NEURONTIN) 300 MG capsule Take 1 capsule (300 mg total) by mouth 3 (three) times daily. 4/26/18 4/26/19  Martha Cho PA-C   hydrOXYzine pamoate (VISTARIL) 25 MG Cap Take 25 mg by mouth every 8 (eight) hours as needed.     Historical Provider, MD   ibuprofen (ADVIL,MOTRIN) 800 MG tablet Take 1 tablet (800 mg  total) by mouth 3 (three) times daily. 9/13/18   GIO Orellana Jr., MD   lisinopril (PRINIVIL,ZESTRIL) 20 MG tablet Take 20 mg by mouth.    Historical Provider, MD   losartan (COZAAR) 100 MG tablet Take 0.5 tablets (50 mg total) by mouth every evening.  Patient taking differently: Take 100 mg by mouth once daily.  4/17/18   Fely Chahal MD   metroNIDAZOLE (FLAGYL) 500 MG tablet Take 500 mg by mouth. 7/6/17   Historical Provider, MD   neomycin (MYCIFRADIN) 500 mg Tab Take 1,000 mg by mouth. 7/6/17   Historical Provider, MD   omeprazole (PRILOSEC) 40 MG capsule Take 40 mg by mouth.    Historical Provider, MD   ondansetron (ZOFRAN) 8 MG tablet Take 8 mg by mouth every 8 (eight) hours as needed for Nausea.    Historical Provider, MD   oxyCODONE-acetaminophen (PERCOCET) 5-325 mg per tablet Take 1 tablet by mouth every 4 (four) hours as needed. 9/13/18   GIO Orellana Jr., MD   scopolamine (TRANSDERM-SCOP) 1.3-1.5 mg (1 mg over 3 days) Place 1 patch onto the skin every 72 hours. 11/7/18   Abby Ramirez MD   sulfamethoxazole-trimethoprim 800-160mg (BACTRIM DS) 800-160 mg Tab  9/17/18   Historical Provider, MD   traZODone (DESYREL) 50 MG tablet Take 50 mg by mouth nightly as needed.     Historical Provider, MD     Scheduled Meds:    pantoprazole  40 mg Intravenous Daily    sulfamethoxazole-trimethoprim 800-160mg  1 tablet Oral BID     Continuous Infusions:    lactated ringers 125 mL/hr at 01/29/19 0442     PRN Meds:diphenhydrAMINE, HYDROmorphone, ondansetron, promethazine (PHENERGAN) IVPB, sodium chloride 0.9%  Anticoagulants/Antiplatelets: no anticoagulation    Allergies: Review of patient's allergies indicates:  No Known Allergies  Sedation Hx: have not been any systemic reactions    Labs:  No results for input(s): INR in the last 168 hours.    Invalid input(s):  PT,  PTT    Recent Labs   Lab 01/30/19  0523   WBC 8.10   HGB 12.6   HCT 39.8   MCV 87         Recent Labs   Lab 01/28/19  1266  01/30/19  0523    114*    136   K 4.0 4.2    102   CO2 25 26   BUN 13 14   CREATININE 0.9 0.9   CALCIUM 9.6 9.6   MG 1.9 2.2   ALT 9*  --    AST 16  --    ALBUMIN 3.6  --    BILITOT 1.1*  --          Vitals:  Temp: 98.1 °F (36.7 °C) (01/30/19 1109)  Pulse: 65 (01/30/19 1109)  Resp: 18 (01/30/19 1109)  BP: 134/81 (01/30/19 1109)  SpO2: 98 % (01/30/19 1109)     Physical Exam:  ASA: 2  Mallampati: 2    General: no acute distress  Mental Status: alert and oriented to person, place and time  HEENT: normocephalic, atraumatic  Chest: unlabored breathing  Heart: regular heart rate  Abdomen: nondistended  Extremity: moves all extremities      Plan:  Sedation Plan: up to moderate  Patient will undergo possible anterior abdominal wall drain placement.      Lucia Garcia MD  Department of Radiology  Resident

## 2019-01-30 NOTE — NURSING TRANSFER
Nursing Transfer Note      1/30/2019     Transfer To: [address]    Transfer via stretcher    Transported by Tiff with transport team    Chart send with patient: Yes

## 2019-01-30 NOTE — PROGRESS NOTES
[facility]  General Surgery  Progress Note    Subjective:     History of Present Illness:  No notes on file    Post-Op Info:  * No surgery found *         Interval History:   IR unable to drain with u/s yesterday. Repeat CT shows persistent fluid collection around the mesh.   Remains AFVSS. WBC normal. Minimal pain    Medications:  Continuous Infusions:   lactated ringers 125 mL/hr at 01/29/19 0442     Scheduled Meds:   pantoprazole  40 mg Intravenous Daily    sulfamethoxazole-trimethoprim 800-160mg  1 tablet Oral BID     PRN Meds:diphenhydrAMINE, HYDROmorphone, ondansetron, promethazine (PHENERGAN) IVPB, sodium chloride 0.9%     Review of patient's allergies indicates:  No Known Allergies  Objective:     Vital Signs (Most Recent):  Temp: 98.1 °F (36.7 °C) (01/30/19 0744)  Pulse: 75 (01/30/19 0744)  Resp: 14 (01/30/19 0744)  BP: 134/89 (01/30/19 0744)  SpO2: 98 % (01/30/19 0744) Vital Signs (24h Range):  Temp:  [97 °F (36.1 °C)-98.1 °F (36.7 °C)] 98.1 °F (36.7 °C)  Pulse:  [59-78] 75  Resp:  [11-20] 14  SpO2:  [96 %-100 %] 98 %  BP: (117-178)/() 134/89     Weight: 99.3 kg (219 lb)  Body mass index is 35.35 kg/m².    Intake/Output - Last 3 Shifts       01/28 0700 - 01/29 0659 01/29 0700 - 01/30 0659 01/30 0700 - 01/31 0659    P.O.  240     I.V. (mL/kg)  1162.5 (11.7)     Total Intake(mL/kg)  1402.5 (14.1)     Urine (mL/kg/hr)  0 (0)     Emesis/NG output  0     Other  0     Stool  0     Blood  0     Total Output  0     Net  +1402.5            Urine Occurrence  2 x     Stool Occurrence  0 x     Emesis Occurrence  0 x           Physical Exam  A&O, NAD  RRR  No Resp distress  ABD: obese, small area of drainage superior to the umbilicus with serous output, mildly TTP    Significant Labs:  CBC:   Recent Labs   Lab 01/30/19  0523   WBC 8.10   RBC 4.60   HGB 12.6   HCT 39.8      MCV 87   MCH 27.4   MCHC 31.7*       Significant Diagnostics:  I have reviewed all pertinent imaging  results/findings within the past 24 hours.    Assessment/Plan:     * Abdominal wall abscess    54 y/o F s/p Ventral hernia repair with mesh in Oct, c/b fluid collection s/p IR drain placement and outpatient ABx in Nov. Now with recurrent fluid collection deep to the mesh    - Discuss with IR again today for drain placement  - Keep NPO until we know if this can be done today  - mIVF  - PRN pain/antiemetics  - Keep on Bactrim         Enrique Portillo MD  General Surgery  [facility]

## 2019-01-30 NOTE — PROGRESS NOTES
Pt arrived to IR room 173, no acute distress noted. Orders and labs reviewed on chart. Awaiting consent.

## 2019-01-31 VITALS
HEART RATE: 81 BPM | OXYGEN SATURATION: 98 % | TEMPERATURE: 98 F | HEIGHT: 66 IN | DIASTOLIC BLOOD PRESSURE: 80 MMHG | RESPIRATION RATE: 20 BRPM | WEIGHT: 219 LBS | SYSTOLIC BLOOD PRESSURE: 138 MMHG | BODY MASS INDEX: 35.2 KG/M2

## 2019-01-31 LAB
ANION GAP SERPL CALC-SCNC: 9 MMOL/L
BASOPHILS # BLD AUTO: 0.03 K/UL
BASOPHILS NFR BLD: 0.4 %
BUN SERPL-MCNC: 14 MG/DL
CA-I BLDV-SCNC: 1.17 MMOL/L
CALCIUM SERPL-MCNC: 9.6 MG/DL
CHLORIDE SERPL-SCNC: 101 MMOL/L
CO2 SERPL-SCNC: 26 MMOL/L
CREAT SERPL-MCNC: 1.1 MG/DL
DIFFERENTIAL METHOD: ABNORMAL
EOSINOPHIL # BLD AUTO: 0.2 K/UL
EOSINOPHIL NFR BLD: 2.4 %
ERYTHROCYTE [DISTWIDTH] IN BLOOD BY AUTOMATED COUNT: 15.4 %
EST. GFR  (AFRICAN AMERICAN): >60 ML/MIN/1.73 M^2
EST. GFR  (NON AFRICAN AMERICAN): 58.3 ML/MIN/1.73 M^2
GLUCOSE SERPL-MCNC: 109 MG/DL
HCT VFR BLD AUTO: 39.3 %
HGB BLD-MCNC: 12.3 G/DL
IMM GRANULOCYTES # BLD AUTO: 0.02 K/UL
IMM GRANULOCYTES NFR BLD AUTO: 0.3 %
LYMPHOCYTES # BLD AUTO: 1.9 K/UL
LYMPHOCYTES NFR BLD: 25.3 %
MAGNESIUM SERPL-MCNC: 2.3 MG/DL
MCH RBC QN AUTO: 26.7 PG
MCHC RBC AUTO-ENTMCNC: 31.3 G/DL
MCV RBC AUTO: 85 FL
MONOCYTES # BLD AUTO: 0.6 K/UL
MONOCYTES NFR BLD: 8.4 %
NEUTROPHILS # BLD AUTO: 4.7 K/UL
NEUTROPHILS NFR BLD: 63.2 %
NRBC BLD-RTO: 0 /100 WBC
PLATELET # BLD AUTO: 292 K/UL
PMV BLD AUTO: 9.5 FL
POTASSIUM SERPL-SCNC: 4.2 MMOL/L
RBC # BLD AUTO: 4.6 M/UL
SODIUM SERPL-SCNC: 136 MMOL/L
WBC # BLD AUTO: 7.39 K/UL

## 2019-01-31 PROCEDURE — 80048 BASIC METABOLIC PNL TOTAL CA: CPT

## 2019-01-31 PROCEDURE — 25000003 PHARM REV CODE 250: Performed by: SURGERY

## 2019-01-31 PROCEDURE — 83735 ASSAY OF MAGNESIUM: CPT

## 2019-01-31 PROCEDURE — 82330 ASSAY OF CALCIUM: CPT

## 2019-01-31 PROCEDURE — 99232 SBSQ HOSP IP/OBS MODERATE 35: CPT | Mod: ,,, | Performed by: SURGERY

## 2019-01-31 PROCEDURE — 99232 PR SUBSEQUENT HOSPITAL CARE,LEVL II: ICD-10-PCS | Mod: ,,, | Performed by: SURGERY

## 2019-01-31 PROCEDURE — 25000003 PHARM REV CODE 250: Performed by: STUDENT IN AN ORGANIZED HEALTH CARE EDUCATION/TRAINING PROGRAM

## 2019-01-31 PROCEDURE — 85025 COMPLETE CBC W/AUTO DIFF WBC: CPT

## 2019-01-31 PROCEDURE — 36415 COLL VENOUS BLD VENIPUNCTURE: CPT

## 2019-01-31 RX ORDER — TRAMADOL HYDROCHLORIDE 50 MG/1
50 TABLET ORAL EVERY 6 HOURS PRN
Qty: 21 TABLET | Refills: 0 | Status: SHIPPED | OUTPATIENT
Start: 2019-01-31 | End: 2020-12-16

## 2019-01-31 RX ORDER — LORAZEPAM 0.5 MG/1
0.5 TABLET ORAL ONCE
Status: COMPLETED | OUTPATIENT
Start: 2019-01-31 | End: 2019-01-31

## 2019-01-31 RX ORDER — GABAPENTIN 300 MG/1
300 CAPSULE ORAL 3 TIMES DAILY
Status: DISCONTINUED | OUTPATIENT
Start: 2019-01-31 | End: 2019-01-31 | Stop reason: HOSPADM

## 2019-01-31 RX ORDER — SULFAMETHOXAZOLE AND TRIMETHOPRIM 800; 160 MG/1; MG/1
1 TABLET ORAL 2 TIMES DAILY
Qty: 28 TABLET | Refills: 0 | Status: SHIPPED | OUTPATIENT
Start: 2019-01-31 | End: 2019-02-14

## 2019-01-31 RX ADMIN — SULFAMETHOXAZOLE AND TRIMETHOPRIM 1 TABLET: 800; 160 TABLET ORAL at 09:01

## 2019-01-31 RX ADMIN — GABAPENTIN 300 MG: 300 CAPSULE ORAL at 02:01

## 2019-01-31 RX ADMIN — LORAZEPAM 0.5 MG: 0.5 TABLET ORAL at 02:01

## 2019-01-31 NOTE — SUBJECTIVE & OBJECTIVE
Interval History:   Drain placed through mesh. Afebrile, VSS, daniel diet.     Medications:  Continuous Infusions:    Scheduled Meds:   pantoprazole  40 mg Intravenous Daily    sulfamethoxazole-trimethoprim 800-160mg  1 tablet Oral BID     PRN Meds:diphenhydrAMINE, HYDROmorphone, ondansetron, promethazine (PHENERGAN) IVPB, sodium chloride 0.9%     Review of patient's allergies indicates:  No Known Allergies  Objective:     Vital Signs (Most Recent):  Temp: 96 °F (35.6 °C) (01/31/19 0728)  Pulse: 69 (01/31/19 0728)  Resp: 14 (01/31/19 0728)  BP: 126/65 (01/31/19 0728)  SpO2: 97 % (01/31/19 0728) Vital Signs (24h Range):  Temp:  [96 °F (35.6 °C)-98.1 °F (36.7 °C)] 96 °F (35.6 °C)  Pulse:  [59-77] 69  Resp:  [11-19] 14  SpO2:  [95 %-100 %] 97 %  BP: (118-150)/(58-89) 126/65     Weight: 99.3 kg (219 lb)  Body mass index is 35.35 kg/m².    Intake/Output - Last 3 Shifts       01/29 0700 - 01/30 0659 01/30 0700 - 01/31 0659 01/31 0700 - 02/01 0659    P.O. 240 360     I.V. (mL/kg) 1162.5 (11.7)      Total Intake(mL/kg) 1402.5 (14.1) 360 (3.6)     Urine (mL/kg/hr) 0 (0) 0 (0)     Emesis/NG output 0      Drains  130     Other 0      Stool 0      Blood 0      Total Output 0 130     Net +1402.5 +230            Urine Occurrence 2 x 2 x     Stool Occurrence 0 x      Emesis Occurrence 0 x            Physical Exam    A&O, NAD  RRR  No Resp distress  ABD: obese, improved distention, LEON with serosang output, no sig TTP    Significant Labs:  CBC:   Recent Labs   Lab 01/31/19  0522   WBC 7.39   RBC 4.60   HGB 12.3   HCT 39.3      MCV 85   MCH 26.7*   MCHC 31.3*       Significant Diagnostics:  I have reviewed all pertinent imaging results/findings within the past 24 hours.

## 2019-01-31 NOTE — HOSPITAL COURSE
The patient was admitted for fluid collection near mesh. She underwent IR drain placement which she tolerated well. During her hospitalization she remained afebrile and HD stable. On POD 1 from the drain placement she was meeting discharge criteria. Prior to discharge, the patient had adequate pain control and was tolerating PO without difficulty. The patient was discharged home in good condition with the below medications and instructions   She will follow up in clinic to discuss further surgical options

## 2019-01-31 NOTE — DISCHARGE SUMMARY
[facility]  General Surgery  Discharge Summary      Patient Name: Barbara Ortiz  MRN: [id number]  Admission Date: 1/28/2019  Hospital Length of Stay: 3 days  Discharge Date and Time:  01/31/2019 8:19 AM  Attending Physician: Teo Baker MD   Discharging Provider: Enrique Portillo MD  Primary Care Provider: Noah Cordero MD    HPI:   No notes on file  Patient is a 53 y.o. female that is presenting from [facility] in [place], [place] as a transfer for an abdominal wall abscess. Her PMH includes diverticulitis, fibromyalgia, chronic back pain, hypertension. In 2017 the underwent sigmoid colectomy for severe diverticulitis. She was left with an end colostomy until April 2018 when she underwent colostomy reversal and incisional hernia repair with Dr. Baker. In September 2018 she underwent repeat incisional hernia repair with Samuel with mesh placement. In November 2018 she began to develop feelings of nausea, focal shooting abdominal pains with superficial burning. She presented as a readmission for an abdominal wall abscess, treated with IV Vanc/Cefepime and IR drain placement. She was shortly discharged with the drain and intervally had it removed in general surgery clinic. She had been doing well since that time.      Five days ago, she began to develop feelings of intermittent nausea, not associated with meals. No vomiting. She also began to develop a focal area of tenderness and induration on her anterior abdomen. The next day she reports that the center of the induration opened and purulent fluid escaped. Since this time she has had mild improvement in her pain. She denies any subjective fevers or chills. No loss of appetite.      Workup at the OSH showed a midline fluid collection in the anterior abdominal wall, dimensions 10.4 x 6.1 x 3.2 cm, superior portion extending toward the skin. Fluid stranding and dermal thickening consistent with cellulitis. No free air, no free fluid, and  no focal fluid collection in the peritoneal cavity.      Indwelling Lines/Drains at time of discharge:   Lines/Drains/Airways     Drain                 Closed/Suction Drain 09/11/18 Anterior Abdomen Bulb 19 Fr. 142 days         Closed/Suction Drain 09/11/18 1522 Anterior Abdomen 19 Fr. 141 days         Closed/Suction Drain 10/24/18 1759 Midline Abdomen Bulb 10 Fr. 98 days         Closed/Suction Drain 01/30/19 1513 Anterior;Midline Abdomen Bulb 8 Fr. less than 1 day              Hospital Course: The patient was admitted for fluid collection near mesh. She underwent IR drain placement which she tolerated well. During her hospitalization she remained afebrile and HD stable. On POD 1 from the drain placement she was meeting discharge criteria. Prior to discharge, the patient had adequate pain control and was tolerating PO without difficulty. The patient was discharged home in good condition with the below medications and instructions   She will follow up in clinic to discuss further surgical options    Pex:  A&O, NAD  RRR  No Resp Distress  ABD: s/appTTP around drain, drain with yellow discharge. No surrounding erythema, non distended     Consults:   Consults (From admission, onward)        Status Ordering Provider     Inpatient consult to Interventional Radiology  Once     Provider:  (Not yet assigned)    Completed ROSETTA STALLINGS     Inpatient consult to Midline team  Once     Provider:  (Not yet assigned)    Completed JOSE RAFAEL DA SILVA          Significant Diagnostic Studies: Labs:   CBC   Recent Labs   Lab 01/30/19  0523 01/31/19  0522   WBC 8.10 7.39   HGB 12.6 12.3   HCT 39.8 39.3    292       Pending Diagnostic Studies:     Procedure Component Value Units Date/Time    IR Ascess Drainage With Tube Placement [[id number]] Updated:  01/30/19 1535    Order Status:  Sent Lab Status:  In process         Final Active Diagnoses:    Diagnosis Date Noted POA    PRINCIPAL PROBLEM:  Abdominal wall abscess [L02.211]  10/23/2018 Yes      Problems Resolved During this Admission:      Discharged Condition: good    Disposition: Home or Self Care    Follow Up:  Follow-up Information     Teo Baker MD On 2/6/2019.    Specialty:  General Surgery  Why:  for drain check  Contact information:  [address]ish[address]NAN[address]  [facility] [address]  [phone]                 Patient Instructions:      Diet Adult Regular     Notify your health care provider if you experience any of the following:  temperature >100.4     Notify your health care provider if you experience any of the following:  persistent nausea and vomiting or diarrhea     Notify your health care provider if you experience any of the following:  severe uncontrolled pain     Notify your health care provider if you experience any of the following:  redness, tenderness, or signs of infection (pain, swelling, redness, odor or green/yellow discharge around incision site)     Notify your health care provider if you experience any of the following:  worsening rash     No dressing needed     Other restrictions (specify):   Order Comments: Empty and record drain output daily     Activity as tolerated     Medications:  Reconciled Home Medications:      Medication List      START taking these medications    traMADol 50 mg tablet  Commonly known as:  ULTRAM  Take 1 tablet (50 mg total) by mouth every 6 (six) hours as needed for Pain.        CHANGE how you take these medications    losartan 100 MG tablet  Commonly known as:  COZAAR  Take 0.5 tablets (50 mg total) by mouth every evening.  What changed:    · how much to take  · when to take this     * sulfamethoxazole-trimethoprim 800-160mg 800-160 mg Tab  Commonly known as:  BACTRIM DS  What changed:  Another medication with the same name was added. Make sure you understand how and when to take each.     * sulfamethoxazole-trimethoprim 800-160mg 800-160 mg Tab  Commonly known as:  BACTRIM DS  Take 1 tablet by mouth 2 (two) times daily. for  14 days  What changed:  You were already taking a medication with the same name, and this prescription was added. Make sure you understand how and when to take each.         * This list has 2 medication(s) that are the same as other medications prescribed for you. Read the directions carefully, and ask your doctor or other care provider to review them with you.            CONTINUE taking these medications    atenolol-chlorthalidone 100-25 mg per tablet  Commonly known as:  TENORETIC  Take 1 tablet by mouth.     docusate sodium 50 MG capsule  Commonly known as:  COLACE  Take 1 capsule (50 mg total) by mouth 2 (two) times daily.     escitalopram oxalate 20 MG tablet  Commonly known as:  LEXAPRO  Take 20 mg by mouth.     estradiol 1 MG tablet  Commonly known as:  ESTRACE  Take 1 mg by mouth once daily.     fluconazole 150 MG Tab  Commonly known as:  DIFLUCAN     gabapentin 300 MG capsule  Commonly known as:  NEURONTIN  Take 1 capsule (300 mg total) by mouth 3 (three) times daily.     hydrOXYzine pamoate 25 MG Cap  Commonly known as:  VISTARIL  Take 25 mg by mouth every 8 (eight) hours as needed.     ibuprofen 800 MG tablet  Commonly known as:  ADVIL,MOTRIN  Take 1 tablet (800 mg total) by mouth 3 (three) times daily.     lisinopril 20 MG tablet  Commonly known as:  PRINIVIL,ZESTRIL  Take 20 mg by mouth.     metroNIDAZOLE 500 MG tablet  Commonly known as:  FLAGYL  Take 500 mg by mouth.     neomycin 500 mg Tab  Commonly known as:  MYCIFRADIN  Take 1,000 mg by mouth.     omeprazole 40 MG capsule  Commonly known as:  PRILOSEC  Take 40 mg by mouth.     ondansetron 8 MG tablet  Commonly known as:  ZOFRAN  Take 8 mg by mouth every 8 (eight) hours as needed for Nausea.     oxyCODONE-acetaminophen 5-325 mg per tablet  Commonly known as:  PERCOCET  Take 1 tablet by mouth every 4 (four) hours as needed.     scopolamine 1.3-1.5 mg (1 mg over 3 days)  Commonly known as:  TRANSDERM-SCOP  Place 1 patch onto the skin every 72 hours.      traZODone 50 MG tablet  Commonly known as:  DESYREL  Take 50 mg by mouth nightly as needed.          Time spent on the discharge of patient: 10 minutes    Enrique Portillo MD  General Surgery  [facility]

## 2019-01-31 NOTE — PROGRESS NOTES
[facility]  General Surgery  Progress Note    Subjective:     History of Present Illness:  No notes on file    Post-Op Info:  * No surgery found *         Interval History:   Drain placed through mesh. Afebrile, VSS, daniel diet.     Medications:  Continuous Infusions:    Scheduled Meds:   pantoprazole  40 mg Intravenous Daily    sulfamethoxazole-trimethoprim 800-160mg  1 tablet Oral BID     PRN Meds:diphenhydrAMINE, HYDROmorphone, ondansetron, promethazine (PHENERGAN) IVPB, sodium chloride 0.9%     Review of patient's allergies indicates:  No Known Allergies  Objective:     Vital Signs (Most Recent):  Temp: 96 °F (35.6 °C) (01/31/19 0728)  Pulse: 69 (01/31/19 0728)  Resp: 14 (01/31/19 0728)  BP: 126/65 (01/31/19 0728)  SpO2: 97 % (01/31/19 0728) Vital Signs (24h Range):  Temp:  [96 °F (35.6 °C)-98.1 °F (36.7 °C)] 96 °F (35.6 °C)  Pulse:  [59-77] 69  Resp:  [11-19] 14  SpO2:  [95 %-100 %] 97 %  BP: (118-150)/(58-89) 126/65     Weight: 99.3 kg (219 lb)  Body mass index is 35.35 kg/m².    Intake/Output - Last 3 Shifts       01/29 0700 - 01/30 0659 01/30 0700 - 01/31 0659 01/31 0700 - 02/01 0659    P.O. 240 360     I.V. (mL/kg) 1162.5 (11.7)      Total Intake(mL/kg) 1402.5 (14.1) 360 (3.6)     Urine (mL/kg/hr) 0 (0) 0 (0)     Emesis/NG output 0      Drains  130     Other 0      Stool 0      Blood 0      Total Output 0 130     Net +1402.5 +230            Urine Occurrence 2 x 2 x     Stool Occurrence 0 x      Emesis Occurrence 0 x            Physical Exam    A&O, NAD  RRR  No Resp distress  ABD: obese, improved distention, LEON with serosang output, no sig TTP    Significant Labs:  CBC:   Recent Labs   Lab 01/31/19  0522   WBC 7.39   RBC 4.60   HGB 12.3   HCT 39.3      MCV 85   MCH 26.7*   MCHC 31.3*       Significant Diagnostics:  I have reviewed all pertinent imaging results/findings within the past 24 hours.    Assessment/Plan:     * Abdominal wall abscess    54 y/o F s/p Ventral hernia repair  with mesh in Oct, c/b fluid collection s/p IR drain placement and outpatient ABx in Nov. Now with recurrent fluid collection deep to the mesh    - s/p IR drain placement   - Keep on Bactrim  - home today         Reema Witt MD  General Surgery  [facility]

## 2019-01-31 NOTE — ASSESSMENT & PLAN NOTE
52 y/o F s/p Ventral hernia repair with mesh in Oct, c/b fluid collection s/p IR drain placement and outpatient ABx in Nov. Now with recurrent fluid collection deep to the mesh    - s/p IR drain placement   - Keep on Bactrim  - home today

## 2019-01-31 NOTE — ASSESSMENT & PLAN NOTE
54 y/o F s/p Ventral hernia repair with mesh in Oct, c/b fluid collection s/p IR drain placement and outpatient ABx in Nov. Now with recurrent fluid collection deep to the mesh    - s/p IR drain placement   - Keep on Bactrim  - home today

## 2019-02-02 LAB — BACTERIA SPEC AEROBE CULT: NORMAL

## 2019-02-04 LAB — BACTERIA SPEC ANAEROBE CULT: NORMAL

## 2019-02-06 ENCOUNTER — OFFICE VISIT (OUTPATIENT)
Dept: SURGERY | Facility: CLINIC | Age: 54
End: 2019-02-06
Payer: MEDICAID

## 2019-02-06 VITALS
DIASTOLIC BLOOD PRESSURE: 90 MMHG | TEMPERATURE: 98 F | WEIGHT: 219 LBS | HEART RATE: 70 BPM | SYSTOLIC BLOOD PRESSURE: 195 MMHG | HEIGHT: 66 IN | BODY MASS INDEX: 35.2 KG/M2

## 2019-02-06 DIAGNOSIS — L02.211 ABDOMINAL WALL ABSCESS: Primary | ICD-10-CM

## 2019-02-06 DIAGNOSIS — K65.1 INTRA-ABDOMINAL ABSCESS: Primary | ICD-10-CM

## 2019-02-06 PROCEDURE — 99213 OFFICE O/P EST LOW 20 MIN: CPT | Mod: S$PBB,,, | Performed by: SURGERY

## 2019-02-06 PROCEDURE — 99214 OFFICE O/P EST MOD 30 MIN: CPT | Mod: PBBFAC | Performed by: SURGERY

## 2019-02-06 PROCEDURE — 99999 PR PBB SHADOW E&M-EST. PATIENT-LVL IV: CPT | Mod: PBBFAC,,, | Performed by: SURGERY

## 2019-02-06 PROCEDURE — 99213 PR OFFICE/OUTPT VISIT, EST, LEVL III, 20-29 MIN: ICD-10-PCS | Mod: S$PBB,,, | Performed by: SURGERY

## 2019-02-06 PROCEDURE — 99999 PR PBB SHADOW E&M-EST. PATIENT-LVL IV: ICD-10-PCS | Mod: PBBFAC,,, | Performed by: SURGERY

## 2019-02-06 RX ORDER — SULFAMETHOXAZOLE AND TRIMETHOPRIM 800; 160 MG/1; MG/1
1 TABLET ORAL 2 TIMES DAILY
Qty: 28 TABLET | Refills: 0 | Status: SHIPPED | OUTPATIENT
Start: 2019-02-06 | End: 2019-02-20

## 2019-02-06 NOTE — PLAN OF CARE
02/01/19 1440   Final Note   Assessment Type Final Discharge Note   Anticipated Discharge Disposition Home   What phone number can be called within the next 1-3 days to see how you are doing after discharge? ([phone])   Hospital Follow Up  Appt(s) scheduled? Yes   Discharge plans and expectations educations in teach back method with documentation complete? Yes   Right Care Referral Info   Post Acute Recommendation (Incomplete)

## 2019-02-06 NOTE — PROGRESS NOTES
HPI:  The patient is status post-IR drain placement for intra-abdominal abscess on 1/30/19. She has occasional pain. She is eating well. The patient denies nausea, vomiting, fever, sweats or problems with the bowels. Her IR drain has been putting out 50mL/day. She is still on bactrim.    PHYSICAL EXAM:  Physical Exam   Constitutional: She is oriented to person, place, and time. She appears well-developed and well-nourished. No distress.   HENT:   Head: Normocephalic and atraumatic.   Eyes: EOM are normal. No scleral icterus.   Neck: Normal range of motion. Neck supple.   Cardiovascular: Normal rate and regular rhythm.   Pulmonary/Chest: Effort normal. No respiratory distress.   Abdominal:   Soft, minimal TTP left of IR drain  IR drain in place - minimal serosang drainage   Musculoskeletal: Normal range of motion. She exhibits no edema or tenderness.   Neurological: She is alert and oriented to person, place, and time.   Skin: Skin is warm and dry.   Psychiatric: She has a normal mood and affect.       A/P  54 yo female w recurrent abdominal wall abscesses, s/p ventral hernia repair  -continue IR drain at this time, will look to remove drain when output <15-10mL  -will obtain CT scan prior to next clinic appointment  -will refill bactrim for 1 month total of abx  -RTC 3 weeks for drain check

## 2019-02-14 ENCOUNTER — TELEPHONE (OUTPATIENT)
Dept: SURGERY | Facility: CLINIC | Age: 54
End: 2019-02-14

## 2019-02-14 NOTE — TELEPHONE ENCOUNTER
Notified patient of upcoming appts:  CT Scan scheduled for 2/25/19 at 11:30am at [facility].  She is to arrive 2 hours prior to the appt and it is nothing to eat or drink for 4 hours prior to the Scan.  Her F/U appt with Dr. Baker is scheduled for 2/17/19 at 1:30pm.  She verbalized understanding, and is aware of appt dates and times.  Reminder letters mailed.

## 2019-02-18 ENCOUNTER — TELEPHONE (OUTPATIENT)
Dept: SURGERY | Facility: CLINIC | Age: 54
End: 2019-02-18

## 2019-02-18 NOTE — TELEPHONE ENCOUNTER
Pt called to report that her stomach is swollen and painful with increasing pain at the IR Drain insertion site.  Rescheduled her CT Scan at [facility] for tomorrow 2/19/19 at 11:30am and her f/u appt with Dr. Baker for 2/20/19 at 1:30pm.  She is aware of appt dates and times and is agreeable to this plan of care.

## 2019-02-18 NOTE — TELEPHONE ENCOUNTER
----- Message from Mare Reed sent at 2/18/2019  8:29 AM CST -----  Contact: Pt.Self   Needs Advice    Reason for call:  Pt. States she would like to speak with nurse  in reference to having CT sooner due to having stomach pains and looking puffy with leakage around drainage cord         Communication Preference:  [phone] (call anytime)     Additional Information:    Thank You

## 2019-02-19 ENCOUNTER — HOSPITAL ENCOUNTER (OUTPATIENT)
Dept: RADIOLOGY | Facility: HOSPITAL | Age: 54
Discharge: HOME | End: 2019-02-19
Attending: SURGERY
Payer: MEDICAID

## 2019-02-19 DIAGNOSIS — K65.1 INTRA-ABDOMINAL ABSCESS: ICD-10-CM

## 2019-02-19 PROCEDURE — 74177 CT ABDOMEN PELVIS WITH CONTRAST: ICD-10-PCS | Mod: 26,,, | Performed by: RADIOLOGY

## 2019-02-19 PROCEDURE — 74177 CT ABD & PELVIS W/CONTRAST: CPT | Mod: 26,,, | Performed by: RADIOLOGY

## 2019-02-19 PROCEDURE — 74177 CT ABD & PELVIS W/CONTRAST: CPT | Mod: TC

## 2019-02-19 PROCEDURE — 25500020 PHARM REV CODE 255: Performed by: SURGERY

## 2019-02-19 RX ADMIN — IOHEXOL 100 ML: 350 INJECTION, SOLUTION INTRAVENOUS at 11:02

## 2019-02-19 RX ADMIN — IOHEXOL 30 ML: 300 INJECTION, SOLUTION INTRAVENOUS at 09:02

## 2019-02-20 ENCOUNTER — OFFICE VISIT (OUTPATIENT)
Dept: SURGERY | Facility: CLINIC | Age: 54
End: 2019-02-20
Payer: MEDICAID

## 2019-02-20 VITALS
DIASTOLIC BLOOD PRESSURE: 74 MMHG | HEIGHT: 66 IN | SYSTOLIC BLOOD PRESSURE: 152 MMHG | TEMPERATURE: 98 F | HEART RATE: 86 BPM | WEIGHT: 219 LBS | BODY MASS INDEX: 35.2 KG/M2

## 2019-02-20 DIAGNOSIS — K65.1 INTRA-ABDOMINAL ABSCESS: Primary | ICD-10-CM

## 2019-02-20 PROCEDURE — 99214 OFFICE O/P EST MOD 30 MIN: CPT | Mod: PBBFAC | Performed by: SURGERY

## 2019-02-20 PROCEDURE — 99024 POSTOP FOLLOW-UP VISIT: CPT | Mod: ,,, | Performed by: SURGERY

## 2019-02-20 PROCEDURE — 99024 PR POST-OP FOLLOW-UP VISIT: ICD-10-PCS | Mod: ,,, | Performed by: SURGERY

## 2019-02-20 PROCEDURE — 99999 PR PBB SHADOW E&M-EST. PATIENT-LVL IV: ICD-10-PCS | Mod: PBBFAC,,, | Performed by: SURGERY

## 2019-02-20 PROCEDURE — 99999 PR PBB SHADOW E&M-EST. PATIENT-LVL IV: CPT | Mod: PBBFAC,,, | Performed by: SURGERY

## 2019-02-20 NOTE — PROGRESS NOTES
HPI:  The patient is status post-IR drain placement for intra-abdominal abscess on 1/30/19. Her pain has increased, and she states her abdomen has gotten bigger.  She is eating well. The patient denies nausea, vomiting, fever, sweats or problems with the bowels. Her IR drain has been putting out <20mL/day. She is still on bactrim. CT scan obtained yesterday showed persistent fluid collection improved in size from previous.    PHYSICAL EXAM:  Physical Exam   Constitutional: She appears well-developed and well-nourished. No distress.   HENT:   Head: Normocephalic and atraumatic.   Neck: Normal range of motion. Neck supple.   Cardiovascular: Normal rate and regular rhythm.   Pulmonary/Chest: Effort normal. No respiratory distress.   Abdominal:   Soft, minimal TTP left of IR drain  IR drain in place - minimal serosang drainage   Skin: Skin is warm and dry.   Psychiatric: She has a normal mood and affect.       A/P  52 yo female w recurrent abdominal wall abscesses, s/p ventral hernia repair  -d/c IR drain as collection has improved and it appears to be no longer in the correct place  -continue abx  -RTC 3 weeks for wound check

## 2019-03-04 LAB — FUNGUS SPEC CULT: NORMAL

## 2019-03-06 ENCOUNTER — OFFICE VISIT (OUTPATIENT)
Dept: SURGERY | Facility: CLINIC | Age: 54
End: 2019-03-06
Payer: MEDICAID

## 2019-03-06 VITALS
WEIGHT: 230.25 LBS | BODY MASS INDEX: 37 KG/M2 | HEART RATE: 78 BPM | HEIGHT: 66 IN | TEMPERATURE: 98 F | DIASTOLIC BLOOD PRESSURE: 95 MMHG | SYSTOLIC BLOOD PRESSURE: 163 MMHG

## 2019-03-06 DIAGNOSIS — R10.9 ABDOMINAL PAIN, UNSPECIFIED ABDOMINAL LOCATION: Primary | ICD-10-CM

## 2019-03-06 PROCEDURE — 99999 PR PBB SHADOW E&M-EST. PATIENT-LVL IV: CPT | Mod: PBBFAC,,, | Performed by: SURGERY

## 2019-03-06 PROCEDURE — 99024 POSTOP FOLLOW-UP VISIT: CPT | Mod: ,,, | Performed by: SURGERY

## 2019-03-06 PROCEDURE — 99214 OFFICE O/P EST MOD 30 MIN: CPT | Mod: PBBFAC | Performed by: SURGERY

## 2019-03-06 PROCEDURE — 99999 PR PBB SHADOW E&M-EST. PATIENT-LVL IV: ICD-10-PCS | Mod: PBBFAC,,, | Performed by: SURGERY

## 2019-03-06 PROCEDURE — 99024 PR POST-OP FOLLOW-UP VISIT: ICD-10-PCS | Mod: ,,, | Performed by: SURGERY

## 2019-03-06 NOTE — PROGRESS NOTES
HPI:  The patient is status post-IR drain placement for intra-abdominal abscess on 1/30/19. Her abdominal pain is stable. She is eating well. The patient denies nausea, vomiting, fever or sweats. She has been getting abdominal pain, associated with the feeling of fecal incontinence. She denies constipation or diarrhea. She is concerned about her colon and is asking for colonoscopy. She has completed her course of antibiotics.     PHYSICAL EXAM:  Physical Exam   Constitutional: She appears well-developed and well-nourished. No distress.   HENT:   Head: Normocephalic and atraumatic.   Neck: Normal range of motion. Neck supple.   Cardiovascular: Normal rate and regular rhythm.   Pulmonary/Chest: Effort normal. No respiratory distress.   Abdominal:   Soft, obese, minimal TTP   Non-distended   Skin: Skin is warm and dry.   Psychiatric: She has a normal mood and affect.       A/P  52 yo female w recurrent abdominal wall abscesses, s/p ventral hernia repair  -doing well after surgery  -will refer to CRS for evaluation of abdominal pain with defecation   -return to clinic PRN

## 2019-04-03 LAB
ACID FAST MOD KINY STN SPEC: NORMAL
MYCOBACTERIUM SPEC QL CULT: NORMAL

## 2019-07-03 ENCOUNTER — TELEPHONE (OUTPATIENT)
Dept: SURGERY | Facility: CLINIC | Age: 54
End: 2019-07-03

## 2019-07-03 NOTE — TELEPHONE ENCOUNTER
Pt called with new onset of pain and abdominal bulge and is worried that she is developing another abdominal wall abscess.  She would like to be seen closer to her home in [place] by an [facility] surgeon.  Numbers given for her to make an appt at [facility] and [facility].  She verbalized understanding.

## 2019-07-03 NOTE — TELEPHONE ENCOUNTER
----- Message from Sae Lopes sent at 7/3/2019  9:01 AM CDT -----  Contact: Pt  Needs Advice    Reason for call:The Pt states that she has been having shooting/stabbing pains in her stomach and nausea.  She said that she has had 2 abscesses since her Mesh Implant was done.  The Pt states that she was told to call Dr. Baker if she has any problems.  She would like to know if Dr. Baker can recommend a provider in [facility].  Please contact the Pt to give advice.  She would like Pam call her back.        Communication Preference:[phone]    Additional Information:

## 2019-09-11 ENCOUNTER — LAB VISIT (OUTPATIENT)
Dept: LAB | Facility: HOSPITAL | Age: 54
End: 2019-09-11
Attending: SURGERY

## 2019-09-11 ENCOUNTER — OFFICE VISIT (OUTPATIENT)
Dept: SURGERY | Facility: CLINIC | Age: 54
End: 2019-09-11
Payer: MEDICAID

## 2019-09-11 VITALS
HEIGHT: 66 IN | BODY MASS INDEX: 37.67 KG/M2 | HEART RATE: 63 BPM | WEIGHT: 234.38 LBS | DIASTOLIC BLOOD PRESSURE: 69 MMHG | SYSTOLIC BLOOD PRESSURE: 145 MMHG | TEMPERATURE: 99 F

## 2019-09-11 DIAGNOSIS — R10.9 ABDOMINAL PAIN, UNSPECIFIED ABDOMINAL LOCATION: ICD-10-CM

## 2019-09-11 DIAGNOSIS — L02.211 ABDOMINAL WALL ABSCESS: Primary | ICD-10-CM

## 2019-09-11 DIAGNOSIS — L02.211 ABDOMINAL WALL ABSCESS: ICD-10-CM

## 2019-09-11 LAB
CREAT SERPL-MCNC: 0.9 MG/DL (ref 0.5–1.4)
EST. GFR  (AFRICAN AMERICAN): >60 ML/MIN/1.73 M^2
EST. GFR  (NON AFRICAN AMERICAN): >60 ML/MIN/1.73 M^2

## 2019-09-11 PROCEDURE — 99999 PR PBB SHADOW E&M-EST. PATIENT-LVL IV: CPT | Mod: PBBFAC,,, | Performed by: SURGERY

## 2019-09-11 PROCEDURE — 99213 OFFICE O/P EST LOW 20 MIN: CPT | Mod: S$PBB,,, | Performed by: SURGERY

## 2019-09-11 PROCEDURE — 99213 PR OFFICE/OUTPT VISIT, EST, LEVL III, 20-29 MIN: ICD-10-PCS | Mod: S$PBB,,, | Performed by: SURGERY

## 2019-09-11 PROCEDURE — 99214 OFFICE O/P EST MOD 30 MIN: CPT | Mod: PBBFAC | Performed by: SURGERY

## 2019-09-11 PROCEDURE — 99999 PR PBB SHADOW E&M-EST. PATIENT-LVL IV: ICD-10-PCS | Mod: PBBFAC,,, | Performed by: SURGERY

## 2019-09-11 PROCEDURE — 82565 ASSAY OF CREATININE: CPT

## 2019-09-11 PROCEDURE — 36415 COLL VENOUS BLD VENIPUNCTURE: CPT

## 2019-09-11 RX ORDER — SULFAMETHOXAZOLE AND TRIMETHOPRIM 800; 160 MG/1; MG/1
1 TABLET ORAL 2 TIMES DAILY
Qty: 14 TABLET | Refills: 1 | Status: SHIPPED | OUTPATIENT
Start: 2019-09-11 | End: 2019-09-25

## 2019-09-11 RX ORDER — METFORMIN HYDROCHLORIDE 500 MG/1
500 TABLET ORAL 2 TIMES DAILY WITH MEALS
COMMUNITY

## 2019-09-11 RX ORDER — ROSUVASTATIN CALCIUM 10 MG/1
10 TABLET, COATED ORAL DAILY
COMMUNITY
End: 2020-12-16 | Stop reason: CLARIF

## 2019-09-11 NOTE — PROGRESS NOTES
"History & Physical    SUBJECTIVE:     History of Present Illness:  Patient is a 54 y.o. female well known to Dr. Baker service presents with new onset abdominal pain. Onset of symptoms was abrupt starting a few weeks ago with gradually worsening course since that time. She states she has sharp pain under her previous incision site. She reports occasional "fevers" and chills.  Patient denies nausea/vomiting/constipation or diarrhea. She has a complicated abdominal surgical history including multiple hernia repairs, colostomy reversal, and several mesh infections requiring drain placement.     Chief Complaint   Patient presents with    Follow-up       Review of patient's allergies indicates:  No Known Allergies    Current Outpatient Medications   Medication Sig Dispense Refill    metFORMIN (GLUCOPHAGE) 500 MG tablet Take 500 mg by mouth 2 (two) times daily with meals.      rosuvastatin (CRESTOR) 10 MG tablet Take 10 mg by mouth once daily.      atenolol-chlorthalidone (TENORETIC) 100-25 mg per tablet Take 1 tablet by mouth.      docusate sodium (COLACE) 50 MG capsule Take 1 capsule (50 mg total) by mouth 2 (two) times daily.  0    escitalopram oxalate (LEXAPRO) 20 MG tablet Take 20 mg by mouth.      estradiol (ESTRACE) 1 MG tablet Take 1 mg by mouth once daily.       fluconazole (DIFLUCAN) 150 MG Tab       gabapentin (NEURONTIN) 300 MG capsule Take 1 capsule (300 mg total) by mouth 3 (three) times daily. 90 capsule 11    hydrOXYzine pamoate (VISTARIL) 25 MG Cap Take 25 mg by mouth every 8 (eight) hours as needed.       ibuprofen (ADVIL,MOTRIN) 800 MG tablet Take 1 tablet (800 mg total) by mouth 3 (three) times daily. 90 tablet 1    lisinopril (PRINIVIL,ZESTRIL) 20 MG tablet Take 20 mg by mouth.      losartan (COZAAR) 100 MG tablet Take 0.5 tablets (50 mg total) by mouth every evening. (Patient taking differently: Take 100 mg by mouth once daily. ) 15 tablet 0    metroNIDAZOLE (FLAGYL) 500 MG tablet " Take 500 mg by mouth.      neomycin (MYCIFRADIN) 500 mg Tab Take 1,000 mg by mouth.      omeprazole (PRILOSEC) 40 MG capsule Take 40 mg by mouth.      ondansetron (ZOFRAN) 8 MG tablet Take 8 mg by mouth every 8 (eight) hours as needed for Nausea.      oxyCODONE-acetaminophen (PERCOCET) 5-325 mg per tablet Take 1 tablet by mouth every 4 (four) hours as needed. 60 tablet 0    scopolamine (TRANSDERM-SCOP) 1.3-1.5 mg (1 mg over 3 days) Place 1 patch onto the skin every 72 hours. 24 patch 0    sulfamethoxazole-trimethoprim 800-160mg (BACTRIM DS) 800-160 mg Tab       traMADol (ULTRAM) 50 mg tablet Take 1 tablet (50 mg total) by mouth every 6 (six) hours as needed for Pain. 21 tablet 0    traZODone (DESYREL) 50 MG tablet Take 50 mg by mouth nightly as needed.        No current facility-administered medications for this visit.        Past Medical History:   Diagnosis Date    Anxiety     Diverticulitis     Fibromyalgia     Hypertension     Sleep apnea      Past Surgical History:   Procedure Laterality Date    CARPAL TUNNEL RELEASE Right     CHOLECYSTECTOMY      COLOSTOMY      COLOSTOMY CLOSURE  04/10/2018    COLOSTOMY-REVERSAL N/A 4/10/2018    Performed by [facility] at [facility] OR [address] [place] [address]    HERNIA REPAIR  04/10/2018    LUMBAR FUSION      PARTIAL HYSTERECTOMY  2002    REPAIR,HERNIA,INCISIONAL WITH MESH N/A 9/11/2018    Performed by Teo Baker MD at [facility] OR [address] [place] [address]    REPAIR-HERNIA-INCISIONAL-INITIAL Open N/A 4/10/2018    Performed by Teo Baker MD at [facility] OR [address] [place] [address]     History reviewed. No pertinent family history.  Social History     Tobacco Use    Smoking status: Never Smoker    Smokeless tobacco: Never Used   Substance Use Topics    Alcohol use: Yes     Comment: occasionally    Drug use: No        Review of Systems:  Review of Systems   Constitutional: Negative for chills and fever.   HENT: Negative for congestion and rhinorrhea.    Eyes: Negative for photophobia  "and visual disturbance.   Respiratory: Negative for cough and shortness of breath.    Cardiovascular: Negative for chest pain and palpitations.   Gastrointestinal: Positive for abdominal pain. Negative for constipation, diarrhea, nausea and vomiting.   Endocrine: Negative for cold intolerance and heat intolerance.   Musculoskeletal: Negative for arthralgias and myalgias.   Skin: Negative for rash and wound.   Allergic/Immunologic: Negative for immunocompromised state.   Neurological: Negative for tremors and weakness.   Hematological: Negative for adenopathy.   Psychiatric/Behavioral: Negative for agitation.       OBJECTIVE:     Vital Signs (Most Recent)  Temp: 98.9 °F (37.2 °C) (09/11/19 1327)  Pulse: 63 (09/11/19 1327)  BP: (!) 145/69 (09/11/19 1327)  5' 6" (1.676 m)  106.3 kg (234 lb 5.6 oz)     Physical Exam:  Physical Exam   Constitutional: She is oriented to person, place, and time. She appears well-developed and well-nourished. No distress.   HENT:   Head: Normocephalic and atraumatic.   Eyes: EOM are normal. No scleral icterus.   Neck: Normal range of motion. Neck supple.   Cardiovascular: Normal rate and regular rhythm.   Pulmonary/Chest: Effort normal. No respiratory distress.   Abdominal:   Soft, obese  Well healed midline scar noted  Area of mild erythema and induration noted at superior aspect of hernia repair   Musculoskeletal: Normal range of motion. She exhibits no edema or tenderness.   Neurological: She is alert and oriented to person, place, and time.   Skin: Skin is warm and dry.   Psychiatric: She has a normal mood and affect.     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

## 2019-09-13 ENCOUNTER — HOSPITAL ENCOUNTER (OUTPATIENT)
Dept: RADIOLOGY | Facility: HOSPITAL | Age: 54
Discharge: HOME | End: 2019-09-13
Attending: SURGERY
Payer: MEDICAID

## 2019-09-13 DIAGNOSIS — L02.211 ABDOMINAL WALL ABSCESS: ICD-10-CM

## 2019-09-13 PROCEDURE — 74150 CT ABDOMEN W/O CONTRAST: CPT | Mod: TC

## 2019-09-13 PROCEDURE — 25500020 PHARM REV CODE 255: Performed by: SURGERY

## 2019-09-13 PROCEDURE — 74150 CT ABDOMEN WITHOUT CONTRAST: ICD-10-PCS | Mod: 26,,, | Performed by: RADIOLOGY

## 2019-09-13 PROCEDURE — 74150 CT ABDOMEN W/O CONTRAST: CPT | Mod: 26,,, | Performed by: RADIOLOGY

## 2019-09-13 RX ADMIN — IOHEXOL 15 ML: 300 INJECTION, SOLUTION INTRAVENOUS at 01:09

## 2019-09-16 ENCOUNTER — TELEPHONE (OUTPATIENT)
Dept: SURGERY | Facility: CLINIC | Age: 54
End: 2019-09-16

## 2019-09-16 DIAGNOSIS — R18.8 INTRAABDOMINAL FLUID COLLECTION: Primary | ICD-10-CM

## 2019-09-16 NOTE — TELEPHONE ENCOUNTER
Left message on patient's voicemail for her to call back regarding her recent CT Scan.  Per Dr. Baker, she will need an IR drain placed for a recurring abdominal wall abscess.  Phone number given for her to call back.

## 2019-09-17 ENCOUNTER — TELEPHONE (OUTPATIENT)
Dept: SURGERY | Facility: CLINIC | Age: 54
End: 2019-09-17

## 2019-09-17 DIAGNOSIS — R18.8 INTRAABDOMINAL FLUID COLLECTION: Primary | ICD-10-CM

## 2019-09-17 DIAGNOSIS — R18.8 INTRA-ABDOMINAL FLUID COLLECTION: Primary | ICD-10-CM

## 2019-09-17 NOTE — TELEPHONE ENCOUNTER
Notified patient that the drainage of her abdominal wall abscess is scheduled with Interventional Radiology for Friday 9/20/19.  She is to [facility] for 12:30pm per IR scheduling.  She verbalized understanding.

## 2019-09-19 ENCOUNTER — TELEPHONE (OUTPATIENT)
Dept: INTERVENTIONAL RADIOLOGY/VASCULAR | Facility: HOSPITAL | Age: 54
End: 2019-09-19

## 2019-09-19 DIAGNOSIS — R18.8 INTRAABDOMINAL FLUID COLLECTION: Primary | ICD-10-CM

## 2019-09-19 RX ORDER — FENTANYL CITRATE 50 UG/ML
50 INJECTION, SOLUTION INTRAMUSCULAR; INTRAVENOUS
Status: CANCELLED | OUTPATIENT
Start: 2019-09-19

## 2019-09-19 RX ORDER — MIDAZOLAM HYDROCHLORIDE 1 MG/ML
1 INJECTION INTRAMUSCULAR; INTRAVENOUS
Status: CANCELLED | OUTPATIENT
Start: 2019-09-19

## 2019-09-20 ENCOUNTER — HOSPITAL ENCOUNTER (OUTPATIENT)
Facility: HOSPITAL | Age: 54
Discharge: HOME | End: 2019-09-20
Attending: SURGERY | Admitting: SURGERY

## 2019-09-20 VITALS
DIASTOLIC BLOOD PRESSURE: 64 MMHG | BODY MASS INDEX: 37.45 KG/M2 | HEART RATE: 51 BPM | TEMPERATURE: 98 F | RESPIRATION RATE: 18 BRPM | HEIGHT: 66 IN | WEIGHT: 233 LBS | OXYGEN SATURATION: 100 % | SYSTOLIC BLOOD PRESSURE: 115 MMHG

## 2019-09-20 DIAGNOSIS — R18.8 INTRAABDOMINAL FLUID COLLECTION: ICD-10-CM

## 2019-09-20 LAB
GRAM STN SPEC: NORMAL
GRAM STN SPEC: NORMAL

## 2019-09-20 PROCEDURE — 63600175 PHARM REV CODE 636 W HCPCS: Performed by: FAMILY MEDICINE

## 2019-09-20 PROCEDURE — 87075 CULTR BACTERIA EXCEPT BLOOD: CPT

## 2019-09-20 PROCEDURE — 87070 CULTURE OTHR SPECIMN AEROBIC: CPT

## 2019-09-20 PROCEDURE — 87186 SC STD MICRODIL/AGAR DIL: CPT

## 2019-09-20 PROCEDURE — 63600175 PHARM REV CODE 636 W HCPCS: Performed by: RADIOLOGY

## 2019-09-20 PROCEDURE — 87205 SMEAR GRAM STAIN: CPT

## 2019-09-20 PROCEDURE — 87077 CULTURE AEROBIC IDENTIFY: CPT

## 2019-09-20 RX ORDER — FENTANYL CITRATE 50 UG/ML
INJECTION, SOLUTION INTRAMUSCULAR; INTRAVENOUS CODE/TRAUMA/SEDATION MEDICATION
Status: COMPLETED | OUTPATIENT
Start: 2019-09-20 | End: 2019-09-20

## 2019-09-20 RX ORDER — MIDAZOLAM HYDROCHLORIDE 1 MG/ML
INJECTION INTRAMUSCULAR; INTRAVENOUS CODE/TRAUMA/SEDATION MEDICATION
Status: COMPLETED | OUTPATIENT
Start: 2019-09-20 | End: 2019-09-20

## 2019-09-20 RX ORDER — ATENOLOL AND CHLORTHALIDONE TABLET 50; 25 MG/1; MG/1
1 TABLET ORAL DAILY
COMMUNITY
End: 2020-12-16 | Stop reason: CLARIF

## 2019-09-20 RX ORDER — SODIUM CHLORIDE 9 MG/ML
500 INJECTION, SOLUTION INTRAVENOUS ONCE
Status: COMPLETED | OUTPATIENT
Start: 2019-09-20 | End: 2019-09-20

## 2019-09-20 RX ORDER — CEFAZOLIN SODIUM 1 G/50ML
SOLUTION INTRAVENOUS
Status: COMPLETED | OUTPATIENT
Start: 2019-09-20 | End: 2019-09-20

## 2019-09-20 RX ADMIN — MIDAZOLAM HYDROCHLORIDE 1 MG: 1 INJECTION, SOLUTION INTRAMUSCULAR; INTRAVENOUS at 02:09

## 2019-09-20 RX ADMIN — CEFAZOLIN SODIUM 1 G: 1 SOLUTION INTRAVENOUS at 03:09

## 2019-09-20 RX ADMIN — FENTANYL CITRATE 50 MCG: 50 INJECTION, SOLUTION INTRAMUSCULAR; INTRAVENOUS at 02:09

## 2019-09-20 RX ADMIN — SODIUM CHLORIDE 500 ML: 0.9 INJECTION, SOLUTION INTRAVENOUS at 01:09

## 2019-09-20 NOTE — PLAN OF CARE
Problem: Adult Inpatient Plan of Care  Goal: Plan of Care Review  Outcome: Outcome(s) achieved Date Met: 09/20/19  Pt tolerated recovery period well and is ready for discharge. Pt VSS, denies pain, procedure site w/o bleeding or hematoma and dsg are CDI. Pt and family were given verbal and written discharge instructions and v/u. Pt in no acute distress and is discharge home to self care.

## 2019-09-20 NOTE — H&P
Radiology History & Physical      SUBJECTIVE:     Chief Complaint: Abdominal wall fluid collection     History of Present Illness:  Barbara Ortiz is a 54 y.o. female who presents for image guided drainage of an abdominal wall fluid collection.    Past Medical History:   Diagnosis Date    Anxiety     Diverticulitis     Fibromyalgia     Hypertension     Sleep apnea      Past Surgical History:   Procedure Laterality Date    CARPAL TUNNEL RELEASE Right     CHOLECYSTECTOMY      COLOSTOMY      COLOSTOMY CLOSURE  04/10/2018    COLOSTOMY-REVERSAL N/A 4/10/2018    Performed by Teo Baker MD at [facility] OR [address] [place] [address]    HERNIA REPAIR  04/10/2018    LUMBAR FUSION      PARTIAL HYSTERECTOMY  2002    REPAIR,HERNIA,INCISIONAL WITH MESH N/A 9/11/2018    Performed by Teo Baker MD at [facility] OR [address] [place] [address]    REPAIR-HERNIA-INCISIONAL-INITIAL Open N/A 4/10/2018    Performed by Teo Baker MD at [facility] OR [address] [place] [address]       Home Meds:   Prior to Admission medications    Medication Sig Start Date End Date Taking? Authorizing Provider   atenolol-chlorthalidone (TENORETIC) 50-25 mg Tab Take 1 tablet by mouth once daily.   Yes Historical Provider, MD   escitalopram oxalate (LEXAPRO) 20 MG tablet Take 20 mg by mouth.   Yes Historical Provider, MD   losartan (COZAAR) 100 MG tablet Take 0.5 tablets (50 mg total) by mouth every evening.  Patient taking differently: Take 100 mg by mouth once daily.  4/17/18  Yes Fely Chahal MD   metFORMIN (GLUCOPHAGE) 500 MG tablet Take 500 mg by mouth 2 (two) times daily with meals.   Yes Historical Provider, MD   rosuvastatin (CRESTOR) 10 MG tablet Take 10 mg by mouth once daily.   Yes Historical Provider, MD   sulfamethoxazole-trimethoprim 800-160mg (BACTRIM DS) 800-160 mg Tab Take 1 tablet by mouth 2 (two) times daily. for 14 days 9/11/19 9/25/19 Yes Martha Cho PA-C   atenolol-chlorthalidone (TENORETIC) 100-25 mg per tablet Take 1 tablet by mouth.    Historical  Provider, MD   docusate sodium (COLACE) 50 MG capsule Take 1 capsule (50 mg total) by mouth 2 (two) times daily. 4/17/18   Martha Cho PA-C   estradiol (ESTRACE) 1 MG tablet Take 1 mg by mouth once daily.  8/25/18   Historical Provider, MD   fluconazole (DIFLUCAN) 150 MG Tab  7/26/18   Historical Provider, MD   gabapentin (NEURONTIN) 300 MG capsule Take 1 capsule (300 mg total) by mouth 3 (three) times daily. 4/26/18 4/26/19  Martha Cho PA-C   hydrOXYzine pamoate (VISTARIL) 25 MG Cap Take 25 mg by mouth every 8 (eight) hours as needed.     Historical Provider, MD   ibuprofen (ADVIL,MOTRIN) 800 MG tablet Take 1 tablet (800 mg total) by mouth 3 (three) times daily. 9/13/18   GIO Orellana Jr., MD   lisinopril (PRINIVIL,ZESTRIL) 20 MG tablet Take 20 mg by mouth.    Historical Provider, MD   metroNIDAZOLE (FLAGYL) 500 MG tablet Take 500 mg by mouth. 7/6/17   Historical Provider, MD   neomycin (MYCIFRADIN) 500 mg Tab Take 1,000 mg by mouth. 7/6/17   Historical Provider, MD   omeprazole (PRILOSEC) 40 MG capsule Take 40 mg by mouth.    Historical Provider, MD   ondansetron (ZOFRAN) 8 MG tablet Take 8 mg by mouth every 8 (eight) hours as needed for Nausea.    Historical Provider, MD   oxyCODONE-acetaminophen (PERCOCET) 5-325 mg per tablet Take 1 tablet by mouth every 4 (four) hours as needed. 9/13/18   GIO Orellana Jr., MD   scopolamine (TRANSDERM-SCOP) 1.3-1.5 mg (1 mg over 3 days) Place 1 patch onto the skin every 72 hours. 11/7/18   Abby Ramirez MD   sulfamethoxazole-trimethoprim 800-160mg (BACTRIM DS) 800-160 mg Tab  9/17/18   Historical Provider, MD   traMADol (ULTRAM) 50 mg tablet Take 1 tablet (50 mg total) by mouth every 6 (six) hours as needed for Pain. 1/31/19   Reema Kooperkamp, MD   traZODone (DESYREL) 50 MG tablet Take 50 mg by mouth nightly as needed.     Historical Provider, MD     Anticoagulants/Antiplatelets: no anticoagulation    Allergies: Review of patient's allergies indicates:  No  Known Allergies  Sedation History:  no adverse reactions    Review of Systems:   Hematological: no known coagulopathies  Respiratory: no shortness of breath  Cardiovascular: no chest pain  Gastrointestinal: no abdominal pain  Genito-Urinary: no dysuria  Musculoskeletal: negative  Neurological: no TIA or stroke symptoms         OBJECTIVE:     Vital Signs (Most Recent)  Temp: 98.3 °F (36.8 °C) (09/20/19 1319)  Pulse: (!) 52 (09/20/19 1319)  Resp: 18 (09/20/19 1319)  BP: (!) 122/55 (09/20/19 1319)  SpO2: 97 % (09/20/19 1319)    Physical Exam:  ASA: III  Mallampati: II    General: no acute distress  Mental Status: alert and oriented to person, place and time  HEENT: normocephalic, atraumatic  Chest: unlabored breathing  Heart: regular heart rate  Abdomen: nondistended  Extremity: moves all extremities    Laboratory  Lab Results   Component Value Date    INR 1.0 09/20/2019       Lab Results   Component Value Date    WBC 10.83 09/20/2019    HGB 12.0 09/20/2019    HCT 36.9 (L) 09/20/2019    MCV 89 09/20/2019     09/20/2019      Lab Results   Component Value Date     01/31/2019     01/31/2019    K 4.2 01/31/2019     01/31/2019    CO2 26 01/31/2019    BUN 14 01/31/2019    CREATININE 0.9 09/11/2019    CALCIUM 9.6 01/31/2019    MG 2.3 01/31/2019    ALT 9 (L) 01/28/2019    AST 16 01/28/2019    ALBUMIN 3.6 01/28/2019    BILITOT 1.1 (H) 01/28/2019       ASSESSMENT/PLAN:     Sedation Plan: Moderate sedation   Patient will undergo image guided fluid collection drainage.    Douglas Deshpande MD  PGY-V  Dept of Radiology   Pager: [phone]

## 2019-09-20 NOTE — PROCEDURES
Radiology Post-Procedure Note    Pre Op Diagnosis: abdominal collection  Post Op Diagnosis: Same    Procedure: drain placement    Procedure performed by: Marcelino Drew MD    Written Informed Consent Obtained: Yes  Specimen Removed: YES serosanguinous fluid  Estimated Blood Loss: Minimal    Findings:   Successful drain placement in anterior abdominal collection.    Patient tolerated procedure well.    @SIG@

## 2019-09-20 NOTE — NURSING
Pt to [facility] post procedure bay # 2. Recd report from ANUJ Kline. Pt arrived A/O and Ox4. Pt VSS, denies pain, procedure site w/o bleeding or hematoma and dsg are CDI. Pt in no acute distress. To be monitor for 30 min post procedure per Dr. Drew. Will continue to monitor.

## 2019-09-20 NOTE — DISCHARGE INSTRUCTIONS
For questions or non emergent concerns call: Radiology clinic  MON-FRI 8 AM- 5PM @ [phone].     For immediate emergent concerns call Radiology resident on call @ [phone].

## 2019-09-20 NOTE — PLAN OF CARE
Plan of care reviewed with patient, patient verbalizes understanding. Pt arrived to ct for image guided fluid collection drainagei  Pt oriented to unit and staff. Comfort measures utilized. Pt safely transferred from stretcher to procedural table. Fall risk reviewed with patient, fall risk interventions maintained. Safety strap applied, positioner pillows utilized to minimize pressure points. Blankets applied. Pt prepped and draped utilizing standard sterile technique. Patient placed on continuous monitoring, as required by sedation policy. Timeouts completed utilizing standard universal time-out, per department and facility policy. RN to remain at bedside, continuous monitoring maintained. Pt resting comfortably. Denies pain/discomfort. Will continue to monitor. See flow sheets for monitoring, medication administration, and updates.

## 2019-09-20 NOTE — PLAN OF CARE
Problem: Fall Injury Risk  Goal: Absence of Fall and Fall-Related Injury  Outcome: Outcome(s) achieved Date Met: 09/20/19  Pt ambulated to bathroom on unit and gait is steady. Pt did not have any episodes of fall or attempt fall.

## 2019-09-20 NOTE — PROGRESS NOTES
Procedure complete. Patient tolerated well. 8fr drain intact to abdominal area. Dressing clean dry and intact. Connected to светлана drain. Patient to recover in [facility] accompanied per Ir nurse. Report at bedside.

## 2019-09-24 LAB
BACTERIA SPEC AEROBE CULT: ABNORMAL
BACTERIA SPEC ANAEROBE CULT: NORMAL

## 2019-09-25 ENCOUNTER — OFFICE VISIT (OUTPATIENT)
Dept: SURGERY | Facility: CLINIC | Age: 54
End: 2019-09-25
Payer: MEDICAID

## 2019-09-25 VITALS
DIASTOLIC BLOOD PRESSURE: 64 MMHG | SYSTOLIC BLOOD PRESSURE: 121 MMHG | BODY MASS INDEX: 38.12 KG/M2 | TEMPERATURE: 99 F | WEIGHT: 237.19 LBS | HEART RATE: 58 BPM | HEIGHT: 66 IN

## 2019-09-25 DIAGNOSIS — R18.8 INTRAABDOMINAL FLUID COLLECTION: Primary | ICD-10-CM

## 2019-09-25 PROCEDURE — 99999 PR PBB SHADOW E&M-EST. PATIENT-LVL IV: CPT | Mod: PBBFAC,,, | Performed by: SURGERY

## 2019-09-25 PROCEDURE — 99213 OFFICE O/P EST LOW 20 MIN: CPT | Mod: S$PBB,,, | Performed by: SURGERY

## 2019-09-25 PROCEDURE — 99213 PR OFFICE/OUTPT VISIT, EST, LEVL III, 20-29 MIN: ICD-10-PCS | Mod: S$PBB,,, | Performed by: SURGERY

## 2019-09-25 PROCEDURE — 99214 OFFICE O/P EST MOD 30 MIN: CPT | Mod: PBBFAC | Performed by: SURGERY

## 2019-09-25 PROCEDURE — 99999 PR PBB SHADOW E&M-EST. PATIENT-LVL IV: ICD-10-PCS | Mod: PBBFAC,,, | Performed by: SURGERY

## 2019-09-25 NOTE — PROGRESS NOTES
"History & Physical    SUBJECTIVE:     History of Present Illness:  Patient is a 54 y.o. female well known to Dr. Baker service presents with new onset abdominal pain. Onset of symptoms was abrupt starting a few weeks ago with gradually worsening course since that time. She states she has sharp pain under her previous incision site. She reports occasional "fevers" and chills.  Patient denies nausea/vomiting/constipation or diarrhea. She has a complicated abdominal surgical history including multiple hernia repairs, colostomy reversal, and several mesh infections requiring drain placement.     Interval history 9/25  Patient underwent IR drain placement on 9/20. She states the drain is putting out 50-75mL/day. She states she gets stinging pain occasionally. She thinks her stomach is warmer than usual. She still has some abx left and is taking them as instructed.       Chief Complaint   Patient presents with    Follow-up       Review of patient's allergies indicates:  No Known Allergies    Current Outpatient Medications   Medication Sig Dispense Refill    atenolol-chlorthalidone (TENORETIC) 100-25 mg per tablet Take 1 tablet by mouth.      atenolol-chlorthalidone (TENORETIC) 50-25 mg Tab Take 1 tablet by mouth once daily.      docusate sodium (COLACE) 50 MG capsule Take 1 capsule (50 mg total) by mouth 2 (two) times daily.  0    escitalopram oxalate (LEXAPRO) 20 MG tablet Take 20 mg by mouth.      estradiol (ESTRACE) 1 MG tablet Take 1 mg by mouth once daily.       hydrOXYzine pamoate (VISTARIL) 25 MG Cap Take 25 mg by mouth every 8 (eight) hours as needed.       ibuprofen (ADVIL,MOTRIN) 800 MG tablet Take 1 tablet (800 mg total) by mouth 3 (three) times daily. 90 tablet 1    lisinopril (PRINIVIL,ZESTRIL) 20 MG tablet Take 20 mg by mouth.      losartan (COZAAR) 100 MG tablet Take 0.5 tablets (50 mg total) by mouth every evening. (Patient taking differently: Take 100 mg by mouth once daily. ) 15 tablet 0    " metFORMIN (GLUCOPHAGE) 500 MG tablet Take 500 mg by mouth 2 (two) times daily with meals.      metroNIDAZOLE (FLAGYL) 500 MG tablet Take 500 mg by mouth.      neomycin (MYCIFRADIN) 500 mg Tab Take 1,000 mg by mouth.      omeprazole (PRILOSEC) 40 MG capsule Take 40 mg by mouth.      ondansetron (ZOFRAN) 8 MG tablet Take 8 mg by mouth every 8 (eight) hours as needed for Nausea.      oxyCODONE-acetaminophen (PERCOCET) 5-325 mg per tablet Take 1 tablet by mouth every 4 (four) hours as needed. 60 tablet 0    rosuvastatin (CRESTOR) 10 MG tablet Take 10 mg by mouth once daily.      scopolamine (TRANSDERM-SCOP) 1.3-1.5 mg (1 mg over 3 days) Place 1 patch onto the skin every 72 hours. 24 patch 0    sulfamethoxazole-trimethoprim 800-160mg (BACTRIM DS) 800-160 mg Tab       sulfamethoxazole-trimethoprim 800-160mg (BACTRIM DS) 800-160 mg Tab Take 1 tablet by mouth 2 (two) times daily. for 14 days 14 tablet 1    traMADol (ULTRAM) 50 mg tablet Take 1 tablet (50 mg total) by mouth every 6 (six) hours as needed for Pain. 21 tablet 0    traZODone (DESYREL) 50 MG tablet Take 50 mg by mouth nightly as needed.       fluconazole (DIFLUCAN) 150 MG Tab       gabapentin (NEURONTIN) 300 MG capsule Take 1 capsule (300 mg total) by mouth 3 (three) times daily. 90 capsule 11     No current facility-administered medications for this visit.        Past Medical History:   Diagnosis Date    Anxiety     Diverticulitis     Fibromyalgia     Hypertension     Sleep apnea      Past Surgical History:   Procedure Laterality Date    CARPAL TUNNEL RELEASE Right     CHOLECYSTECTOMY      COLOSTOMY      COLOSTOMY CLOSURE  04/10/2018    HERNIA REPAIR  04/10/2018    LUMBAR FUSION      PARTIAL HYSTERECTOMY  2002     History reviewed. No pertinent family history.  Social History     Tobacco Use    Smoking status: Never Smoker    Smokeless tobacco: Never Used   Substance Use Topics    Alcohol use: Yes     Comment: occasionally    Drug  "use: No        Review of Systems:  Review of Systems   Constitutional: Negative for chills and fever.   HENT: Negative for congestion and rhinorrhea.    Eyes: Negative for photophobia and visual disturbance.   Respiratory: Negative for cough and shortness of breath.    Cardiovascular: Negative for chest pain and palpitations.   Gastrointestinal: Positive for abdominal pain. Negative for constipation, diarrhea, nausea and vomiting.   Endocrine: Negative for cold intolerance and heat intolerance.   Musculoskeletal: Negative for arthralgias and myalgias.   Skin: Negative for rash and wound.   Allergic/Immunologic: Negative for immunocompromised state.   Neurological: Negative for tremors and weakness.   Hematological: Negative for adenopathy.   Psychiatric/Behavioral: Negative for agitation.       OBJECTIVE:     Vital Signs (Most Recent)  Temp: 98.6 °F (37 °C) (09/25/19 1314)  Pulse: (!) 58 (09/25/19 1314)  BP: 121/64 (09/25/19 1314)  5' 6" (1.676 m)  107.6 kg (237 lb 3.4 oz)     Physical Exam:  Physical Exam   Constitutional: She is oriented to person, place, and time. She appears well-developed and well-nourished. No distress.   HENT:   Head: Normocephalic and atraumatic.   Eyes: EOM are normal. No scleral icterus.   Neck: Normal range of motion. Neck supple.   Cardiovascular: Normal rate and regular rhythm.   Pulmonary/Chest: Effort normal. No respiratory distress.   Abdominal:   Soft, obese  Well healed midline scar noted  IR drain in place - minimal serosang drainage, no erythema over abdomen   Musculoskeletal: Normal range of motion. She exhibits no edema or tenderness.   Neurological: She is alert and oriented to person, place, and time.   Skin: Skin is warm and dry.   Psychiatric: She has a normal mood and affect.     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

## 2019-09-27 ENCOUNTER — TELEPHONE (OUTPATIENT)
Dept: SURGERY | Facility: CLINIC | Age: 54
End: 2019-09-27

## 2019-09-27 ENCOUNTER — HOSPITAL ENCOUNTER (EMERGENCY)
Facility: HOSPITAL | Age: 54
Discharge: HOME | End: 2019-09-27
Attending: EMERGENCY MEDICINE

## 2019-09-27 VITALS
TEMPERATURE: 98 F | OXYGEN SATURATION: 97 % | BODY MASS INDEX: 37.61 KG/M2 | HEART RATE: 71 BPM | RESPIRATION RATE: 20 BRPM | DIASTOLIC BLOOD PRESSURE: 78 MMHG | SYSTOLIC BLOOD PRESSURE: 140 MMHG | HEIGHT: 66 IN | WEIGHT: 234 LBS

## 2019-09-27 DIAGNOSIS — T85.698A JP DRAIN, BROKEN, INITIAL ENCOUNTER: Primary | ICD-10-CM

## 2019-09-27 PROCEDURE — 99282 EMERGENCY DEPT VISIT SF MDM: CPT

## 2019-09-27 NOTE — TELEPHONE ENCOUNTER
----- Message from Memo Cortes sent at 9/27/2019  8:36 AM CDT -----  Pt states her drain is leaking and she need to speak with someone asap.      [phone]

## 2019-09-27 NOTE — TELEPHONE ENCOUNTER
Spoke with patient who reports that her LEON Drain in her abdominal fluid collection started leaking at the tube/bulb connection.  The LEON bulb does not hold suction.  She went to the ER at [facility] and they were unable to help her with the leaky tubing.  She was told to f/u at [facility].  She will secure the connection with tape and try to get the bulb to hold suction.  She was given a plastic bag to place the drain in so that it does not leak on her clothing.  She will call back on Monday 9/30/19 if she needs a clinic appt.  She denies pain, swelling, redness, drainage at the insertion site or fever.  She verbalized understanding and is agreeable to this plan of care.

## 2019-09-27 NOTE — TELEPHONE ENCOUNTER
----- Message from Mare Reed sent at 9/27/2019 12:19 PM CDT -----  Contact: Pt.Self   Same Day Appointment Request    Was an appointment with another provider offered?  yes schedule unavailable @ time of call        Reason for FST appt.:  f/u J Tube leaking     Communication Preference:  [phone]    Additional Information:    Thank You

## 2019-09-27 NOTE — ED TRIAGE NOTES
Patient had an intra abdominal fluid collection surgery on 9/20, patient's bulb drain in not working and she is leaking fluid all over herself.

## 2019-09-27 NOTE — TELEPHONE ENCOUNTER
Left message on patient's voicemail that call was returned.  Per JERRELL Lemons, she may go to her nearest [facility] ER for evaluation of her leaking drain.  Direct phone number given for her to call back.

## 2019-09-30 ENCOUNTER — TELEPHONE (OUTPATIENT)
Dept: SURGERY | Facility: CLINIC | Age: 54
End: 2019-09-30

## 2019-09-30 NOTE — TELEPHONE ENCOUNTER
Pt called to report that her LEON Drain in her abdominal wall abscess site is still leaking at the bulb/tubing connection.  The bulb does hold suction.  She has a 'sticking' pain at the tube insertion site that occurs with position change.  Per JERRELL Lemons, she may be seen in clinic on Wednesday 10/2/19 for possible drain removal.  Appt scheduled.  She verbalized and understanding and is agreeable to this plan of care.

## 2019-09-30 NOTE — TELEPHONE ENCOUNTER
----- Message from Sarah Sanders sent at 9/30/2019  8:04 AM CDT -----  Contact: pt: [phone]  Pt called to be scheduled with Dr. Baker to have LEON tube checked, states the tube is causing her pain, first available isn't until 10/9 would like to be seen sooner     Please contact pt: [phone]

## 2019-10-02 ENCOUNTER — OFFICE VISIT (OUTPATIENT)
Dept: SURGERY | Facility: CLINIC | Age: 54
End: 2019-10-02
Payer: MEDICAID

## 2019-10-02 VITALS
DIASTOLIC BLOOD PRESSURE: 84 MMHG | WEIGHT: 237.31 LBS | BODY MASS INDEX: 38.14 KG/M2 | HEART RATE: 65 BPM | HEIGHT: 66 IN | SYSTOLIC BLOOD PRESSURE: 176 MMHG | TEMPERATURE: 98 F

## 2019-10-02 DIAGNOSIS — L02.211 ABDOMINAL WALL ABSCESS: Primary | ICD-10-CM

## 2019-10-02 PROCEDURE — 99024 PR POST-OP FOLLOW-UP VISIT: ICD-10-PCS | Mod: ,,, | Performed by: SURGERY

## 2019-10-02 PROCEDURE — 99213 OFFICE O/P EST LOW 20 MIN: CPT | Mod: PBBFAC | Performed by: SURGERY

## 2019-10-02 PROCEDURE — 99999 PR PBB SHADOW E&M-EST. PATIENT-LVL III: CPT | Mod: PBBFAC,,, | Performed by: SURGERY

## 2019-10-02 PROCEDURE — 99024 POSTOP FOLLOW-UP VISIT: CPT | Mod: ,,, | Performed by: SURGERY

## 2019-10-02 PROCEDURE — 99999 PR PBB SHADOW E&M-EST. PATIENT-LVL III: ICD-10-PCS | Mod: PBBFAC,,, | Performed by: SURGERY

## 2019-10-02 NOTE — PROGRESS NOTES
"History & Physical    SUBJECTIVE:     History of Present Illness:  Patient is a 54 y.o. female well known to Dr. Baker's service who presents for follow up of recurrent mesh infection. Per chart review, recent mesh infection present with quick onset of symptoms with gradually worsening course since that time. She states she had sharp pain under her previous incision site. She reports occasional "fevers" and chills.  Patient denies nausea/vomiting/constipation or diarrhea. She has a complicated abdominal surgical history including multiple hernia repairs, colostomy reversal, and several mesh infections requiring drain placement (iR) in January, 2019.     Interval history 9/25  Patient underwent IR drain placement on 9/20. She states the drain is putting was previously putting out 50-75mL/day. However, since being seen last week the drain has been malfunctioning; no longer holds suction, is leaking at the skin and connection sites. Is having a pinching, pulling sensation deep to insertion site. Denies fevers/chills. Having regular bowel movements, appetite. However, does feel a bit nauseas when she experiences pain. Notes no output from drain except did have about 25 cc out from drain overnight to this am. Otherwise just drainage around drain; milky serous in character.       Chief Complaint   Patient presents with    Follow-up       Review of patient's allergies indicates:  No Known Allergies    Current Outpatient Medications   Medication Sig Dispense Refill    atenolol-chlorthalidone (TENORETIC) 100-25 mg per tablet Take 1 tablet by mouth.      atenolol-chlorthalidone (TENORETIC) 50-25 mg Tab Take 1 tablet by mouth once daily.      docusate sodium (COLACE) 50 MG capsule Take 1 capsule (50 mg total) by mouth 2 (two) times daily.  0    escitalopram oxalate (LEXAPRO) 20 MG tablet Take 20 mg by mouth.      estradiol (ESTRACE) 1 MG tablet Take 1 mg by mouth once daily.       fluconazole (DIFLUCAN) 150 MG Tab    "    gabapentin (NEURONTIN) 300 MG capsule Take 1 capsule (300 mg total) by mouth 3 (three) times daily. 90 capsule 11    hydrOXYzine pamoate (VISTARIL) 25 MG Cap Take 25 mg by mouth every 8 (eight) hours as needed.       ibuprofen (ADVIL,MOTRIN) 800 MG tablet Take 1 tablet (800 mg total) by mouth 3 (three) times daily. 90 tablet 1    lisinopril (PRINIVIL,ZESTRIL) 20 MG tablet Take 20 mg by mouth.      losartan (COZAAR) 100 MG tablet Take 0.5 tablets (50 mg total) by mouth every evening. (Patient taking differently: Take 100 mg by mouth once daily. ) 15 tablet 0    metFORMIN (GLUCOPHAGE) 500 MG tablet Take 500 mg by mouth 2 (two) times daily with meals.      metroNIDAZOLE (FLAGYL) 500 MG tablet Take 500 mg by mouth.      neomycin (MYCIFRADIN) 500 mg Tab Take 1,000 mg by mouth.      omeprazole (PRILOSEC) 40 MG capsule Take 40 mg by mouth.      ondansetron (ZOFRAN) 8 MG tablet Take 8 mg by mouth every 8 (eight) hours as needed for Nausea.      oxyCODONE-acetaminophen (PERCOCET) 5-325 mg per tablet Take 1 tablet by mouth every 4 (four) hours as needed. 60 tablet 0    rosuvastatin (CRESTOR) 10 MG tablet Take 10 mg by mouth once daily.      scopolamine (TRANSDERM-SCOP) 1.3-1.5 mg (1 mg over 3 days) Place 1 patch onto the skin every 72 hours. 24 patch 0    sulfamethoxazole-trimethoprim 800-160mg (BACTRIM DS) 800-160 mg Tab       traMADol (ULTRAM) 50 mg tablet Take 1 tablet (50 mg total) by mouth every 6 (six) hours as needed for Pain. 21 tablet 0    traZODone (DESYREL) 50 MG tablet Take 50 mg by mouth nightly as needed.        No current facility-administered medications for this visit.        Past Medical History:   Diagnosis Date    Anxiety     Diverticulitis     Fibromyalgia     Hypertension     Sleep apnea      Past Surgical History:   Procedure Laterality Date    CARPAL TUNNEL RELEASE Right     CHOLECYSTECTOMY      COLOSTOMY      COLOSTOMY CLOSURE  04/10/2018    HERNIA REPAIR  04/10/2018     "LUMBAR FUSION      PARTIAL HYSTERECTOMY  2002     No family history on file.  Social History     Tobacco Use    Smoking status: Never Smoker    Smokeless tobacco: Never Used   Substance Use Topics    Alcohol use: Yes     Comment: occasionally    Drug use: No        Review of Systems:  Review of Systems   Constitutional: Negative for chills and fever.   HENT: Negative for congestion and rhinorrhea.    Eyes: Negative for photophobia and visual disturbance.   Respiratory: Negative for cough and shortness of breath.    Cardiovascular: Negative for chest pain and palpitations.   Gastrointestinal: Positive for abdominal pain. Negative for constipation, diarrhea, nausea and vomiting.   Endocrine: Negative for cold intolerance and heat intolerance.   Musculoskeletal: Negative for arthralgias and myalgias.   Skin: Negative for rash and wound.   Allergic/Immunologic: Negative for immunocompromised state.   Neurological: Negative for tremors and weakness.   Hematological: Negative for adenopathy.   Psychiatric/Behavioral: Negative for agitation.       OBJECTIVE:     Vital Signs (Most Recent)  Temp: 98.4 °F (36.9 °C) (10/02/19 1052)  Pulse: 65 (10/02/19 1052)  BP: (!) 176/84 (10/02/19 1052)  5' 6" (1.676 m)  107.6 kg (237 lb 5.2 oz)     Physical Exam:  Physical Exam   Constitutional: She is oriented to person, place, and time. She appears well-developed and well-nourished. No distress.   HENT:   Head: Normocephalic and atraumatic.   Eyes: EOM are normal. No scleral icterus.   Neck: Normal range of motion. Neck supple.   Cardiovascular: Normal rate and regular rhythm.   Pulmonary/Chest: Effort normal. No respiratory distress.   Abdominal:   Soft, obese  Well healed midline scar noted  IR drain in place - minimal milky serous drainage, no erythema over abdomen.    Musculoskeletal: Normal range of motion. She exhibits no edema or tenderness.   Neurological: She is alert and oriented to person, place, and time.   Skin: Skin is " warm and dry.   Psychiatric: She has a normal mood and affect.     ASSESSMENT/PLAN:   53 yo female w possible recurrent mesh infection, s/p IR drain placement 9/20. IR drain not functioning-not holding suction.  -Able to improve function of drain in clinic, flushed drain as well.  -Will schedule patient for CT Abdomen at [facility] in one week. Will follow up on scan results and then schedule follow up appointment with us after results reviewed.  -Counseled patient on flushing drain at home, tid with 5 cc of sterile saline. Continued counseling on proper sterile drain care.      Lorrie Stanley MD  General Surgery, PGYII  [facility]

## 2019-10-02 NOTE — H&P (VIEW-ONLY)
"History & Physical    SUBJECTIVE:     History of Present Illness:  Patient is a 54 y.o. female well known to Dr. Baker's service who presents for follow up of recurrent mesh infection. Per chart review, recent mesh infection present with quick onset of symptoms with gradually worsening course since that time. She states she had sharp pain under her previous incision site. She reports occasional "fevers" and chills.  Patient denies nausea/vomiting/constipation or diarrhea. She has a complicated abdominal surgical history including multiple hernia repairs, colostomy reversal, and several mesh infections requiring drain placement (iR) in January, 2019.     Interval history 9/25  Patient underwent IR drain placement on 9/20. She states the drain is putting was previously putting out 50-75mL/day. However, since being seen last week the drain has been malfunctioning; no longer holds suction, is leaking at the skin and connection sites. Is having a pinching, pulling sensation deep to insertion site. Denies fevers/chills. Having regular bowel movements, appetite. However, does feel a bit nauseas when she experiences pain. Notes no output from drain except did have about 25 cc out from drain overnight to this am. Otherwise just drainage around drain; milky serous in character.       Chief Complaint   Patient presents with    Follow-up       Review of patient's allergies indicates:  No Known Allergies    Current Outpatient Medications   Medication Sig Dispense Refill    atenolol-chlorthalidone (TENORETIC) 100-25 mg per tablet Take 1 tablet by mouth.      atenolol-chlorthalidone (TENORETIC) 50-25 mg Tab Take 1 tablet by mouth once daily.      docusate sodium (COLACE) 50 MG capsule Take 1 capsule (50 mg total) by mouth 2 (two) times daily.  0    escitalopram oxalate (LEXAPRO) 20 MG tablet Take 20 mg by mouth.      estradiol (ESTRACE) 1 MG tablet Take 1 mg by mouth once daily.       fluconazole (DIFLUCAN) 150 MG Tab    "    gabapentin (NEURONTIN) 300 MG capsule Take 1 capsule (300 mg total) by mouth 3 (three) times daily. 90 capsule 11    hydrOXYzine pamoate (VISTARIL) 25 MG Cap Take 25 mg by mouth every 8 (eight) hours as needed.       ibuprofen (ADVIL,MOTRIN) 800 MG tablet Take 1 tablet (800 mg total) by mouth 3 (three) times daily. 90 tablet 1    lisinopril (PRINIVIL,ZESTRIL) 20 MG tablet Take 20 mg by mouth.      losartan (COZAAR) 100 MG tablet Take 0.5 tablets (50 mg total) by mouth every evening. (Patient taking differently: Take 100 mg by mouth once daily. ) 15 tablet 0    metFORMIN (GLUCOPHAGE) 500 MG tablet Take 500 mg by mouth 2 (two) times daily with meals.      metroNIDAZOLE (FLAGYL) 500 MG tablet Take 500 mg by mouth.      neomycin (MYCIFRADIN) 500 mg Tab Take 1,000 mg by mouth.      omeprazole (PRILOSEC) 40 MG capsule Take 40 mg by mouth.      ondansetron (ZOFRAN) 8 MG tablet Take 8 mg by mouth every 8 (eight) hours as needed for Nausea.      oxyCODONE-acetaminophen (PERCOCET) 5-325 mg per tablet Take 1 tablet by mouth every 4 (four) hours as needed. 60 tablet 0    rosuvastatin (CRESTOR) 10 MG tablet Take 10 mg by mouth once daily.      scopolamine (TRANSDERM-SCOP) 1.3-1.5 mg (1 mg over 3 days) Place 1 patch onto the skin every 72 hours. 24 patch 0    sulfamethoxazole-trimethoprim 800-160mg (BACTRIM DS) 800-160 mg Tab       traMADol (ULTRAM) 50 mg tablet Take 1 tablet (50 mg total) by mouth every 6 (six) hours as needed for Pain. 21 tablet 0    traZODone (DESYREL) 50 MG tablet Take 50 mg by mouth nightly as needed.        No current facility-administered medications for this visit.        Past Medical History:   Diagnosis Date    Anxiety     Diverticulitis     Fibromyalgia     Hypertension     Sleep apnea      Past Surgical History:   Procedure Laterality Date    CARPAL TUNNEL RELEASE Right     CHOLECYSTECTOMY      COLOSTOMY      COLOSTOMY CLOSURE  04/10/2018    HERNIA REPAIR  04/10/2018     "LUMBAR FUSION      PARTIAL HYSTERECTOMY  2002     No family history on file.  Social History     Tobacco Use    Smoking status: Never Smoker    Smokeless tobacco: Never Used   Substance Use Topics    Alcohol use: Yes     Comment: occasionally    Drug use: No        Review of Systems:  Review of Systems   Constitutional: Negative for chills and fever.   HENT: Negative for congestion and rhinorrhea.    Eyes: Negative for photophobia and visual disturbance.   Respiratory: Negative for cough and shortness of breath.    Cardiovascular: Negative for chest pain and palpitations.   Gastrointestinal: Positive for abdominal pain. Negative for constipation, diarrhea, nausea and vomiting.   Endocrine: Negative for cold intolerance and heat intolerance.   Musculoskeletal: Negative for arthralgias and myalgias.   Skin: Negative for rash and wound.   Allergic/Immunologic: Negative for immunocompromised state.   Neurological: Negative for tremors and weakness.   Hematological: Negative for adenopathy.   Psychiatric/Behavioral: Negative for agitation.       OBJECTIVE:     Vital Signs (Most Recent)  Temp: 98.4 °F (36.9 °C) (10/02/19 1052)  Pulse: 65 (10/02/19 1052)  BP: (!) 176/84 (10/02/19 1052)  5' 6" (1.676 m)  107.6 kg (237 lb 5.2 oz)     Physical Exam:  Physical Exam   Constitutional: She is oriented to person, place, and time. She appears well-developed and well-nourished. No distress.   HENT:   Head: Normocephalic and atraumatic.   Eyes: EOM are normal. No scleral icterus.   Neck: Normal range of motion. Neck supple.   Cardiovascular: Normal rate and regular rhythm.   Pulmonary/Chest: Effort normal. No respiratory distress.   Abdominal:   Soft, obese  Well healed midline scar noted  IR drain in place - minimal milky serous drainage, no erythema over abdomen.    Musculoskeletal: Normal range of motion. She exhibits no edema or tenderness.   Neurological: She is alert and oriented to person, place, and time.   Skin: Skin is " warm and dry.   Psychiatric: She has a normal mood and affect.     ASSESSMENT/PLAN:   55 yo female w possible recurrent mesh infection, s/p IR drain placement 9/20. IR drain not functioning-not holding suction.  -Able to improve function of drain in clinic, flushed drain as well.  -Will schedule patient for CT Abdomen at [facility] in one week. Will follow up on scan results and then schedule follow up appointment with us after results reviewed.  -Counseled patient on flushing drain at home, tid with 5 cc of sterile saline. Continued counseling on proper sterile drain care.      Lorrie Stanley MD  General Surgery, PGYII  [facility]

## 2019-10-07 ENCOUNTER — HOSPITAL ENCOUNTER (OUTPATIENT)
Dept: RADIOLOGY | Facility: HOSPITAL | Age: 54
Discharge: HOME | End: 2019-10-07
Attending: SURGERY
Payer: MEDICAID

## 2019-10-07 DIAGNOSIS — L02.211 ABDOMINAL WALL ABSCESS: ICD-10-CM

## 2019-10-07 PROCEDURE — 74150 CT ABDOMEN W/O CONTRAST: CPT | Mod: 26,,, | Performed by: RADIOLOGY

## 2019-10-07 PROCEDURE — 74150 CT ABDOMEN WITHOUT CONTRAST: ICD-10-PCS | Mod: 26,,, | Performed by: RADIOLOGY

## 2019-10-07 PROCEDURE — 74150 CT ABDOMEN W/O CONTRAST: CPT | Mod: TC

## 2019-10-09 DIAGNOSIS — L02.211 ABDOMINAL WALL ABSCESS: Primary | ICD-10-CM

## 2019-10-11 ENCOUNTER — TELEPHONE (OUTPATIENT)
Dept: INTERVENTIONAL RADIOLOGY/VASCULAR | Facility: HOSPITAL | Age: 54
End: 2019-10-11

## 2019-10-11 RX ORDER — FENTANYL CITRATE 50 UG/ML
50 INJECTION, SOLUTION INTRAMUSCULAR; INTRAVENOUS
Status: CANCELLED | OUTPATIENT
Start: 2019-10-11

## 2019-10-11 RX ORDER — MIDAZOLAM HYDROCHLORIDE 1 MG/ML
1 INJECTION INTRAMUSCULAR; INTRAVENOUS
Status: CANCELLED | OUTPATIENT
Start: 2019-10-11

## 2019-10-13 NOTE — ED PROVIDER NOTES
"Encounter Date: 9/27/2019       History     Chief Complaint   Patient presents with    Post-op Problem     Patient had an intra abdominal fluid collection surgery on 9/20, patient's bulb drain in not working and she is leaking fluid all over herself.     53yo female with pmh diverticulitis, HTN, and IR LEON drain placement on 9/20/19 for intra-abdominal fluid collection presents to ED for evaluation of a leaking drain. States the bulb drain in not working and she is "leaking fluid all over herself." Believes the leak to be at the tube/bulb connection, but also states the bulb does not hold suction. Denies pain, swelling, redness, drainage at the insertion site or fever.         Review of patient's allergies indicates:  No Known Allergies  Past Medical History:   Diagnosis Date    Anxiety     Diverticulitis     Fibromyalgia     Hypertension     Sleep apnea      Past Surgical History:   Procedure Laterality Date    CARPAL TUNNEL RELEASE Right     CHOLECYSTECTOMY      COLOSTOMY      COLOSTOMY CLOSURE  04/10/2018    HERNIA REPAIR  04/10/2018    LUMBAR FUSION      PARTIAL HYSTERECTOMY  2002     History reviewed. No pertinent family history.  Social History     Tobacco Use    Smoking status: Never Smoker    Smokeless tobacco: Never Used   Substance Use Topics    Alcohol use: Yes     Comment: occasionally    Drug use: No     Review of Systems   Constitutional: Negative for appetite change, chills, diaphoresis, fatigue and fever.   HENT: Negative for congestion, ear pain, rhinorrhea, sinus pressure, sinus pain, sore throat and tinnitus.    Eyes: Negative for photophobia and visual disturbance.   Respiratory: Negative for cough, chest tightness, shortness of breath and wheezing.    Cardiovascular: Negative for chest pain, palpitations and leg swelling.   Gastrointestinal: Negative for abdominal pain, constipation, diarrhea, nausea and vomiting.   Endocrine: Negative for cold intolerance, heat intolerance, " polydipsia, polyphagia and polyuria.   Genitourinary: Negative for decreased urine volume, difficulty urinating, dysuria, flank pain, frequency, hematuria and urgency.   Musculoskeletal: Negative for arthralgias, back pain, gait problem, joint swelling, myalgias, neck pain and neck stiffness.   Skin: Negative for color change, pallor, rash and wound.   Allergic/Immunologic: Negative for immunocompromised state.   Neurological: Negative for dizziness, syncope, weakness, light-headedness, numbness and headaches.   Hematological: Negative for adenopathy. Does not bruise/bleed easily.   Psychiatric/Behavioral: Negative for decreased concentration, dysphoric mood and sleep disturbance. The patient is not nervous/anxious.    All other systems reviewed and are negative.      Physical Exam     Initial Vitals [09/27/19 1102]   BP Pulse Resp Temp SpO2   (!) 140/78 71 20 98.1 °F (36.7 °C) 97 %      MAP       --         Physical Exam    Nursing note and vitals reviewed.  Constitutional: She appears well-developed and well-nourished. She is not diaphoretic. No distress.   HENT:   Head: Normocephalic and atraumatic.   Right Ear: External ear normal.   Left Ear: External ear normal.   Nose: Nose normal.   Mouth/Throat: Oropharynx is clear and moist.   Eyes: Conjunctivae are normal. Pupils are equal, round, and reactive to light. No scleral icterus.   Neck: Normal range of motion. Neck supple. No JVD present.   Cardiovascular: Normal rate, regular rhythm, normal heart sounds and intact distal pulses.   Pulmonary/Chest: Breath sounds normal. No respiratory distress. She has no wheezes. She has no rhonchi. She has no rales. She exhibits no tenderness.   Abdominal: Soft. Bowel sounds are normal. She exhibits no distension. There is no tenderness. There is no rebound and no guarding.   Musculoskeletal: Normal range of motion. She exhibits no edema or tenderness.   Lymphadenopathy:     She has no cervical adenopathy.   Neurological: She  is alert and oriented to person, place, and time. GCS score is 15. GCS eye subscore is 4. GCS verbal subscore is 5. GCS motor subscore is 6.   Skin: Skin is warm and dry. Capillary refill takes less than 2 seconds. No rash noted. No erythema. No pallor.   LEON site clean and dry, no erythema or drainage  Leaking within the tubing   Psychiatric: She has a normal mood and affect. Her behavior is normal. Judgment and thought content normal.         ED Course   Procedures  Labs Reviewed - No data to display       Imaging Results    None          Medical Decision Making:   Differential Diagnosis:   LEON drain malfunction  ED Management:  Unfortunately we do not have the appropriate LEON drain to interchange the parts. She has an appt with her physician at [facility] and is comfortable awaiting that apptointment.                       Clinical Impression:       ICD-10-CM ICD-9-CM   1. LEON drain, broken, initial encounter T85.698A 996.59         Disposition:   Disposition: Discharged  Condition: Stable                        Digna Arora MD  10/13/19 2377

## 2019-10-14 ENCOUNTER — HOSPITAL ENCOUNTER (OUTPATIENT)
Facility: HOSPITAL | Age: 54
Discharge: HOME | End: 2019-10-14
Attending: SURGERY | Admitting: SURGERY

## 2019-10-14 VITALS
BODY MASS INDEX: 38.12 KG/M2 | HEART RATE: 56 BPM | SYSTOLIC BLOOD PRESSURE: 147 MMHG | RESPIRATION RATE: 16 BRPM | DIASTOLIC BLOOD PRESSURE: 74 MMHG | HEIGHT: 66 IN | WEIGHT: 237.19 LBS | TEMPERATURE: 98 F | OXYGEN SATURATION: 100 %

## 2019-10-14 DIAGNOSIS — L02.211 ABDOMINAL WALL ABSCESS: ICD-10-CM

## 2019-10-14 LAB
GRAM STN SPEC: NORMAL
GRAM STN SPEC: NORMAL
POCT GLUCOSE: 91 MG/DL (ref 70–110)

## 2019-10-14 PROCEDURE — 87077 CULTURE AEROBIC IDENTIFY: CPT

## 2019-10-14 PROCEDURE — 87205 SMEAR GRAM STAIN: CPT

## 2019-10-14 PROCEDURE — 87070 CULTURE OTHR SPECIMN AEROBIC: CPT

## 2019-10-14 PROCEDURE — 87186 SC STD MICRODIL/AGAR DIL: CPT

## 2019-10-14 PROCEDURE — 25000003 PHARM REV CODE 250: Performed by: SURGERY

## 2019-10-14 PROCEDURE — 63600175 PHARM REV CODE 636 W HCPCS: Performed by: FAMILY MEDICINE

## 2019-10-14 PROCEDURE — 87102 FUNGUS ISOLATION CULTURE: CPT

## 2019-10-14 PROCEDURE — 63600175 PHARM REV CODE 636 W HCPCS: Performed by: RADIOLOGY

## 2019-10-14 PROCEDURE — 82962 GLUCOSE BLOOD TEST: CPT

## 2019-10-14 PROCEDURE — 87075 CULTR BACTERIA EXCEPT BLOOD: CPT

## 2019-10-14 RX ORDER — FENTANYL CITRATE 50 UG/ML
INJECTION, SOLUTION INTRAMUSCULAR; INTRAVENOUS CODE/TRAUMA/SEDATION MEDICATION
Status: COMPLETED | OUTPATIENT
Start: 2019-10-14 | End: 2019-10-14

## 2019-10-14 RX ORDER — DIPHENHYDRAMINE HYDROCHLORIDE 50 MG/ML
INJECTION INTRAMUSCULAR; INTRAVENOUS CODE/TRAUMA/SEDATION MEDICATION
Status: COMPLETED | OUTPATIENT
Start: 2019-10-14 | End: 2019-10-14

## 2019-10-14 RX ORDER — SODIUM CHLORIDE 9 MG/ML
500 INJECTION, SOLUTION INTRAVENOUS ONCE
Status: COMPLETED | OUTPATIENT
Start: 2019-10-14 | End: 2019-10-14

## 2019-10-14 RX ORDER — MIDAZOLAM HYDROCHLORIDE 1 MG/ML
INJECTION INTRAMUSCULAR; INTRAVENOUS CODE/TRAUMA/SEDATION MEDICATION
Status: COMPLETED | OUTPATIENT
Start: 2019-10-14 | End: 2019-10-14

## 2019-10-14 RX ORDER — METHYLPREDNISOLONE 4 MG/1
TABLET ORAL
Qty: 1 PACKAGE | Refills: 0 | Status: SHIPPED | OUTPATIENT
Start: 2019-10-14 | End: 2019-10-14 | Stop reason: HOSPADM

## 2019-10-14 RX ORDER — HYDROMORPHONE HYDROCHLORIDE 1 MG/ML
INJECTION, SOLUTION INTRAMUSCULAR; INTRAVENOUS; SUBCUTANEOUS CODE/TRAUMA/SEDATION MEDICATION
Status: COMPLETED | OUTPATIENT
Start: 2019-10-14 | End: 2019-10-14

## 2019-10-14 RX ORDER — LIDOCAINE HYDROCHLORIDE 10 MG/ML
1 INJECTION, SOLUTION EPIDURAL; INFILTRATION; INTRACAUDAL; PERINEURAL ONCE
Status: COMPLETED | OUTPATIENT
Start: 2019-10-14 | End: 2019-10-14

## 2019-10-14 RX ADMIN — DIPHENHYDRAMINE HYDROCHLORIDE 50 MG: 50 INJECTION, SOLUTION INTRAMUSCULAR; INTRAVENOUS at 04:10

## 2019-10-14 RX ADMIN — MIDAZOLAM HYDROCHLORIDE 2 MG: 1 INJECTION, SOLUTION INTRAMUSCULAR; INTRAVENOUS at 04:10

## 2019-10-14 RX ADMIN — FENTANYL CITRATE 100 MCG: 50 INJECTION, SOLUTION INTRAMUSCULAR; INTRAVENOUS at 04:10

## 2019-10-14 RX ADMIN — METHYLPREDNISOLONE SODIUM SUCCINATE 120 MG: 40 INJECTION, POWDER, FOR SOLUTION INTRAMUSCULAR; INTRAVENOUS at 05:10

## 2019-10-14 RX ADMIN — SODIUM CHLORIDE 500 ML: 0.9 INJECTION, SOLUTION INTRAVENOUS at 01:10

## 2019-10-14 RX ADMIN — LIDOCAINE HYDROCHLORIDE 1 MG: 10 INJECTION, SOLUTION EPIDURAL; INFILTRATION; INTRACAUDAL; PERINEURAL at 01:10

## 2019-10-14 RX ADMIN — HYDROMORPHONE HYDROCHLORIDE 1 MG: 1 INJECTION, SOLUTION INTRAMUSCULAR; INTRAVENOUS; SUBCUTANEOUS at 04:10

## 2019-10-14 NOTE — PLAN OF CARE
Plan of care reviewed with patient, patient verbalizes understanding. Pt arrived to  for abscessogram and drain tube change. Pt oriented to unit and staff. Comfort measures utilized. Pt safely transferred from stretcher to procedural table. Fall risk reviewed with patient, fall risk interventions maintained. Safety strap applied, positioner pillows utilized to minimize pressure points. Blankets applied. Pt prepped and draped utilizing standard sterile technique. Patient placed on continuous monitoring, as required by sedation policy. Timeouts completed utilizing standard universal time-out, per department and facility policy. RN to remain at bedside, continuous monitoring maintained. Pt resting comfortably. Denies pain/discomfort. Will continue to monitor. See flow sheets for monitoring, medication administration, and updates.

## 2019-10-14 NOTE — PROGRESS NOTES
States itching is better, continue to have some itching and throat irritation, will continue to monitor.vss.

## 2019-10-14 NOTE — NURSING
Pt abscess drain removed. Pt tolerated procedure fairly. Pt has a lot of pain comorbidities that made moderate sedation difficult and hypertension. VSS otherwise. Specimens obtained for cultures. Pt to recover in [facility] then discharge home.

## 2019-10-14 NOTE — INTERVAL H&P NOTE
The patient has been examined and the H&P has been reviewed:    I concur with the findings and no changes have occurred since H&P was written.    Anesthesia/Surgery risks, benefits and alternative options discussed and understood by patient/family.          Active Hospital Problems    Diagnosis  POA    Abdominal wall abscess [L02.211]  Yes      Resolved Hospital Problems   No resolved problems to display.

## 2019-10-14 NOTE — PROGRESS NOTES
Received to [facility] for post procedure recovery. Patient awake and alert, c/o itching in arms and throat area. Vss stable.

## 2019-10-14 NOTE — H&P
Radiology History & Physical      SUBJECTIVE:     Chief Complaint: abscess drain    History of Present Illness:  Barbara Ortiz is a 54 y.o. female presenting for abscess drain which is no longer functioning and possible retracted out of the abscess pocket.    CT from 10/7 demonstrates residual smaller abscess of the midline anterior abdominal wall musculature measuring 3.4 cm AP x 5.9 cm transverse by 5.8 cm cc.  This abscess appears to connect to the cavity containing the pigtail catheter.    Past Medical History:   Diagnosis Date    Anxiety     Diverticulitis     Fibromyalgia     Hypertension     Sleep apnea      Past Surgical History:   Procedure Laterality Date    CARPAL TUNNEL RELEASE Right     CHOLECYSTECTOMY      COLOSTOMY      COLOSTOMY CLOSURE  04/10/2018    HERNIA REPAIR  04/10/2018    LUMBAR FUSION      PARTIAL HYSTERECTOMY  2002       Home Meds:   Prior to Admission medications    Medication Sig Start Date End Date Taking? Authorizing Provider   atenolol-chlorthalidone (TENORETIC) 50-25 mg Tab Take 1 tablet by mouth once daily.   Yes Historical Provider, MD   escitalopram oxalate (LEXAPRO) 20 MG tablet Take 20 mg by mouth.   Yes Historical Provider, MD   losartan (COZAAR) 100 MG tablet Take 0.5 tablets (50 mg total) by mouth every evening.  Patient taking differently: Take 100 mg by mouth once daily.  4/17/18  Yes Fely Chahal MD   metFORMIN (GLUCOPHAGE) 500 MG tablet Take 500 mg by mouth 2 (two) times daily with meals.   Yes Historical Provider, MD   rosuvastatin (CRESTOR) 10 MG tablet Take 10 mg by mouth once daily.   Yes Historical Provider, MD   traZODone (DESYREL) 50 MG tablet Take 50 mg by mouth nightly as needed.    Yes Historical Provider, MD   estradiol (ESTRACE) 1 MG tablet Take 1 mg by mouth once daily.  8/25/18   Historical Provider, MD   fluconazole (DIFLUCAN) 150 MG Tab  7/26/18   Historical Provider, MD   gabapentin (NEURONTIN) 300 MG capsule Take 1 capsule (300 mg  total) by mouth 3 (three) times daily. 4/26/18 4/26/19  Martha Cho PA-C   hydrOXYzine pamoate (VISTARIL) 25 MG Cap Take 25 mg by mouth every 8 (eight) hours as needed.     Historical Provider, MD   omeprazole (PRILOSEC) 40 MG capsule Take 40 mg by mouth.    Historical Provider, MD   ondansetron (ZOFRAN) 8 MG tablet Take 8 mg by mouth every 8 (eight) hours as needed for Nausea.    Historical Provider, MD   traMADol (ULTRAM) 50 mg tablet Take 1 tablet (50 mg total) by mouth every 6 (six) hours as needed for Pain. 1/31/19   Reema Witt MD   atenolol-chlorthalidone (TENORETIC) 100-25 mg per tablet Take 1 tablet by mouth.  10/14/19  Historical Provider, MD   docusate sodium (COLACE) 50 MG capsule Take 1 capsule (50 mg total) by mouth 2 (two) times daily. 4/17/18 10/14/19  Martha Cho PA-C   ibuprofen (ADVIL,MOTRIN) 800 MG tablet Take 1 tablet (800 mg total) by mouth 3 (three) times daily. 9/13/18 10/14/19  GIO Orellana Jr., MD   lisinopril (PRINIVIL,ZESTRIL) 20 MG tablet Take 20 mg by mouth.  10/14/19  Historical Provider, MD   metroNIDAZOLE (FLAGYL) 500 MG tablet Take 500 mg by mouth. 7/6/17 10/14/19  Historical Provider, MD   neomycin (MYCIFRADIN) 500 mg Tab Take 1,000 mg by mouth. 7/6/17 10/14/19  Historical Provider, MD   oxyCODONE-acetaminophen (PERCOCET) 5-325 mg per tablet Take 1 tablet by mouth every 4 (four) hours as needed. 9/13/18 10/14/19  GIO Orellana Jr., MD   scopolamine (TRANSDERM-SCOP) 1.3-1.5 mg (1 mg over 3 days) Place 1 patch onto the skin every 72 hours. 11/7/18 10/14/19  Abby Ramirez MD   sulfamethoxazole-trimethoprim 800-160mg (BACTRIM DS) 800-160 mg Tab  9/17/18 10/14/19  Historical Provider, MD     Anticoagulants/Antiplatelets: no anticoagulation    Allergies: Review of patient's allergies indicates:  No Known Allergies  Sedation History:  no adverse reactions    Review of Systems:   Hematological: no known coagulopathies  Respiratory: no shortness of  breath  Cardiovascular: no chest pain  Gastrointestinal: no abdominal pain  Genito-Urinary: no dysuria  Musculoskeletal: negative  Neurological: no TIA or stroke symptoms         OBJECTIVE:     Vital Signs (Most Recent)       Physical Exam:  ASA: 3  Mallampati: 2    General: no acute distress  Mental Status: alert and oriented to person, place and time  HEENT: normocephalic, atraumatic  Chest: unlabored breathing  Heart: regular heart rate  Abdomen: nondistended  Extremity: moves all extremities    Laboratory  Lab Results   Component Value Date    INR 1.0 09/20/2019       Lab Results   Component Value Date    WBC 10.83 09/20/2019    HGB 12.0 09/20/2019    HCT 36.9 (L) 09/20/2019    MCV 89 09/20/2019     09/20/2019      Lab Results   Component Value Date     01/31/2019     01/31/2019    K 4.2 01/31/2019     01/31/2019    CO2 26 01/31/2019    BUN 14 01/31/2019    CREATININE 0.9 09/11/2019    CALCIUM 9.6 01/31/2019    MG 2.3 01/31/2019    ALT 9 (L) 01/28/2019    AST 16 01/28/2019    ALBUMIN 3.6 01/28/2019    BILITOT 1.1 (H) 01/28/2019       ASSESSMENT/PLAN:     Sedation Plan: up to moderate    Patient will undergo abscess drain replacement or new placement under CT if no longer appropriately positioned.      Flo Vargas MD, MS  Radiology  PGY-2  Pager: [phone]

## 2019-10-14 NOTE — PROGRESS NOTES
Md notified of throat itching, solumedrol 120mg administered iv as ordered. Will continue to monitor patient.

## 2019-10-14 NOTE — PROGRESS NOTES
Rounded on patient. RN assisted patient to bathroom. Patient back in pre-op room waiting for procedure. Call light in reach. Family at bedside, no other concerns at this time.

## 2019-10-14 NOTE — PROCEDURES
Radiology Post-Procedure Note    Pre Op Diagnosis: Midline anterior abdominal abscess    Post Op Diagnosis: Midline anterior abdominal abscess    Procedure: Midline anterior abdominal fluid collection aspiration    Procedure performed by: Gerardo Thrashre MD, Jessica Srinivasan MD    Written Informed Consent Obtained: Yes    Specimen Removed: YES 12 cc serosanguineous fluid    Estimated Blood Loss: Minimal    Findings: CT and US were used for localization of abnormal fluid collection. No residual fluid was identified around a pre-existing subcutaneous APD.  The catheter was subsequently pulled.  Small volume intraperitoneal fluid was identified just anterior to several loops of small bowel.  A needle was inserted into the fluid collection and 12 cc serosanguinous fluid was aspirated.  A wire was inserted into the collection and attempt was made to dilate the tract.  However, dilation was unsuccessful, suspected due to the needle traversing a hernia mesh.      A specimen was sent to the lab for further analysis and culture.    Pam Abbott RN with Dr. Baker's office was contacted regarding the above.      The patient tolerated procedure well and there were no complications. Please see procedure report under Imaging for further details.      Jessica Srinivasan MD  Resident  Department of Radiology  Pager: [phone]

## 2019-10-14 NOTE — PROGRESS NOTES
Patient seen per Jessica Srinivasan md, pt states still has minimal throat itching. Patient transferred to er per nursing staff per md orders. Report given at beside to Er nurse.

## 2019-10-14 NOTE — DISCHARGE SUMMARY
Radiology Discharge Summary      Hospital Course: No complications    Procedure Performed: Subcutaneous drain removal and midline anterior intraperitoneal collection aspiration    Admit Date: 10/14/2019  Discharge Date: 10/14/2019     Instructions Given to Patient: Yes  Diet: Resume prior diet  Activity: activity as tolerated and no driving for today    Description of Condition on Discharge: Stable  Vital Signs (Most Recent): Temp: 97.7 °F (36.5 °C) (10/14/19 1402)  Pulse: (!) 52 (10/14/19 1640)  Resp: 15 (10/14/19 1640)  BP: (!) 179/92 (10/14/19 1640)  SpO2: 100 % (10/14/19 1640)    Discharge Disposition: Home    Discharge Diagnosis: Midline anterior intraperitoneal collection     Follow-up:  Follow-up laboratory results.  Further follow-up per ordering physician.      Jessica Srinivasan MD  Resident  Department of Radiology  Pager: [phone]

## 2019-10-15 ENCOUNTER — TELEPHONE (OUTPATIENT)
Dept: SURGERY | Facility: CLINIC | Age: 54
End: 2019-10-15

## 2019-10-15 NOTE — TELEPHONE ENCOUNTER
----- Message from Rachelle De La Torre sent at 10/15/2019  3:13 PM CDT -----  Contact: Patient  Returning a call     Caller name:: Pt    Who Left Message for Patient:: Pam Abbott    Nature of call:: Questions concerning next steps for surgery    Communication preference:: [phone]    Additional info::

## 2019-10-15 NOTE — TELEPHONE ENCOUNTER
Unable to leave message on either Home or Mobile numbers that call was returned.  Mobile number, the voicemail was full and the voicemail was not set up for her Home number.

## 2019-10-15 NOTE — TELEPHONE ENCOUNTER
----- Message from Tamar Castillo sent at 10/15/2019 11:03 AM CDT -----  Contact: pt  [phone]  Radiologist did put drainage tube back in.  Pt wants to know the status and what is the next step.

## 2019-10-17 LAB — BACTERIA SPEC AEROBE CULT: ABNORMAL

## 2019-10-18 LAB — BACTERIA SPEC ANAEROBE CULT: NORMAL

## 2019-10-23 ENCOUNTER — OFFICE VISIT (OUTPATIENT)
Dept: SURGERY | Facility: CLINIC | Age: 54
End: 2019-10-23
Payer: MEDICAID

## 2019-10-23 VITALS
DIASTOLIC BLOOD PRESSURE: 92 MMHG | SYSTOLIC BLOOD PRESSURE: 191 MMHG | BODY MASS INDEX: 38.57 KG/M2 | WEIGHT: 240 LBS | HEART RATE: 48 BPM | TEMPERATURE: 98 F | HEIGHT: 66 IN

## 2019-10-23 DIAGNOSIS — L02.211 ABDOMINAL WALL ABSCESS: Primary | ICD-10-CM

## 2019-10-23 PROCEDURE — 99024 POSTOP FOLLOW-UP VISIT: CPT | Mod: ,,, | Performed by: SURGERY

## 2019-10-23 PROCEDURE — 99213 OFFICE O/P EST LOW 20 MIN: CPT | Mod: PBBFAC | Performed by: SURGERY

## 2019-10-23 PROCEDURE — 99024 PR POST-OP FOLLOW-UP VISIT: ICD-10-PCS | Mod: ,,, | Performed by: SURGERY

## 2019-10-23 PROCEDURE — 99999 PR PBB SHADOW E&M-EST. PATIENT-LVL III: CPT | Mod: PBBFAC,,, | Performed by: SURGERY

## 2019-10-23 PROCEDURE — 99999 PR PBB SHADOW E&M-EST. PATIENT-LVL III: ICD-10-PCS | Mod: PBBFAC,,, | Performed by: SURGERY

## 2019-10-23 NOTE — PROGRESS NOTES
"History & Physical    SUBJECTIVE:     History of Present Illness:  Patient is a 54 y.o. female well known to Dr. Baker's service who presents for follow up of recurrent mesh infection. Per chart review, recent mesh infection present with quick onset of symptoms with gradually worsening course since that time. She states she had sharp pain under her previous incision site. She reports occasional "fevers" and chills.  Patient denies nausea/vomiting/constipation or diarrhea. She has a complicated abdominal surgical history including multiple hernia repairs, colostomy reversal, and several mesh infections requiring drain placement (iR) in January, 2019.     Interval history 9/25  Patient underwent IR drain placement on 9/20. She states the drain is putting was previously putting out 50-75mL/day. However, since being seen last week the drain has been malfunctioning; no longer holds suction, is leaking at the skin and connection sites. Is having a pinching, pulling sensation deep to insertion site. Denies fevers/chills. Having regular bowel movements, appetite. However, does feel a bit nauseas when she experiences pain. Notes no output from drain except did have about 25 cc out from drain overnight to this am. Otherwise just drainage around drain; milky serous in character.     Interval history 10/23/19:  Patient presents after IR drain removal. Recent CT scan with interval clearing of the subcutaneous abscess. Notes pain has significantly proved; very minimal pain to palpation. Tolerating diet; no nausea/vomiting. Notes significant anxiety today/recently as she does not know what to expect with surgical planning; wondering if she should have midline surgically repaired. Denies recent fevers/chills. Also denies any recent drainage from midline. Has been off antibiotics for the past two weeks.       Chief Complaint   Patient presents with    Follow-up       Review of patient's allergies indicates:   Allergen Reactions "    Dilaudid [hydromorphone] Itching    Fentanyl Itching    Versed [midazolam] Itching       Current Outpatient Medications   Medication Sig Dispense Refill    atenolol-chlorthalidone (TENORETIC) 50-25 mg Tab Take 1 tablet by mouth once daily.      escitalopram oxalate (LEXAPRO) 20 MG tablet Take 20 mg by mouth.      estradiol (ESTRACE) 1 MG tablet Take 1 mg by mouth once daily.       fluconazole (DIFLUCAN) 150 MG Tab       hydrOXYzine pamoate (VISTARIL) 25 MG Cap Take 25 mg by mouth every 8 (eight) hours as needed.       losartan (COZAAR) 100 MG tablet Take 0.5 tablets (50 mg total) by mouth every evening. (Patient taking differently: Take 100 mg by mouth once daily. ) 15 tablet 0    metFORMIN (GLUCOPHAGE) 500 MG tablet Take 500 mg by mouth 2 (two) times daily with meals.      omeprazole (PRILOSEC) 40 MG capsule Take 40 mg by mouth.      ondansetron (ZOFRAN) 8 MG tablet Take 8 mg by mouth every 8 (eight) hours as needed for Nausea.      rosuvastatin (CRESTOR) 10 MG tablet Take 10 mg by mouth once daily.      traMADol (ULTRAM) 50 mg tablet Take 1 tablet (50 mg total) by mouth every 6 (six) hours as needed for Pain. 21 tablet 0    traZODone (DESYREL) 50 MG tablet Take 50 mg by mouth nightly as needed.       gabapentin (NEURONTIN) 300 MG capsule Take 1 capsule (300 mg total) by mouth 3 (three) times daily. 90 capsule 11     No current facility-administered medications for this visit.        Past Medical History:   Diagnosis Date    Anxiety     Baker's cyst of knee, left     Diabetes mellitus     Diverticulitis     Fibromyalgia     Hypertension     Sleep apnea      Past Surgical History:   Procedure Laterality Date    CARPAL TUNNEL RELEASE Right     CHOLECYSTECTOMY      COLOSTOMY      COLOSTOMY CLOSURE  04/10/2018    HERNIA REPAIR  04/10/2018    LUMBAR FUSION      PARTIAL HYSTERECTOMY  2002    REPLACEMENT OF NEPHROSTOMY TUBE N/A 10/14/2019    Procedure: REPLACEMENT, NEPHROSTOMY TUBE;   "Surgeon: [facility] Provider;  Location: [facility] OR [address] [place] [address];  Service: Anesthesiology;  Laterality: N/A;  189     No family history on file.  Social History     Tobacco Use    Smoking status: Never Smoker    Smokeless tobacco: Never Used   Substance Use Topics    Alcohol use: Yes     Comment: occasionally    Drug use: No        Review of Systems:  Review of Systems   Constitutional: Negative for chills and fever.   HENT: Negative for congestion and rhinorrhea.    Eyes: Negative for photophobia and visual disturbance.   Respiratory: Negative for cough and shortness of breath.    Cardiovascular: Negative for chest pain and palpitations.   Gastrointestinal: Negative for abdominal pain, constipation, diarrhea, nausea and vomiting.   Endocrine: Negative for cold intolerance and heat intolerance.   Musculoskeletal: Negative for arthralgias and myalgias.   Skin: Negative for rash and wound.   Allergic/Immunologic: Negative for immunocompromised state.   Neurological: Negative for tremors and weakness.   Hematological: Negative for adenopathy.   Psychiatric/Behavioral: Negative for agitation.       OBJECTIVE:     Vital Signs (Most Recent)  Temp: 97.6 °F (36.4 °C) (10/23/19 1035)  Pulse: (!) 48 (10/23/19 1035)  BP: (!) 191/92 (10/23/19 1035)  5' 6" (1.676 m)  108.8 kg (239 lb 15.5 oz)     Physical Exam:  Physical Exam   Constitutional: She is oriented to person, place, and time. She appears well-developed and well-nourished. No distress.   HENT:   Head: Normocephalic and atraumatic.   Eyes: EOM are normal. No scleral icterus.   Neck: Normal range of motion. Neck supple.   Cardiovascular: Normal rate and regular rhythm.   Pulmonary/Chest: Effort normal. No respiratory distress.   Abdominal:   Soft, obese  Well healed midline scar noted  No drainage from midline   Musculoskeletal: Normal range of motion. She exhibits no edema or tenderness.   Neurological: She is alert and oriented to person, place, and time.   Skin: " Skin is warm and dry.   Psychiatric: She has a normal mood and affect.     ASSESSMENT/PLAN:   53 yo female s/p IR drain placement 9/20, now IR drain removal with interval CT scan showing abscess resolution. Pain greatly improved on exam.    Plan:  -Lengthy discussion with patient today about risks/benefits of pursuing surgical repair of midline wound; prior mesh repair. After discussion patient decided that she would like to wait to pursue surgery for now, but if she should develop an abscess or signs of infection, increased pain in the future she will contact our office for evaluation/pursue surgery.   -Please contact us for any further questions/concerns. Otherwise follow up prn.      Lorrie Stanley MD  General Surgery, PGYII [facility]

## 2019-11-21 LAB — FUNGUS SPEC CULT: NORMAL

## 2020-02-05 ENCOUNTER — TELEPHONE (OUTPATIENT)
Dept: SURGERY | Facility: CLINIC | Age: 55
End: 2020-02-05

## 2020-02-05 NOTE — TELEPHONE ENCOUNTER
----- Message from Angela Flores sent at 2/5/2020  8:34 AM CST -----  ARLENE COLES  MRN:  [id number]    Pt is calling try to set up an CT scan and would like to speak with Ferny . Pt is stating her side is hurting her, and she is in so much pain. She would like to know if she can be seeing in [place] in stead of coming all the way to [place]. She is stating can she be seeing today if possible. Call back number is     [phone]

## 2020-02-05 NOTE — TELEPHONE ENCOUNTER
Pt called to report that she had surgery 2 weeks ago at a [facility] to remove a brain tumor.  For the past few days, she has been nauseated and has abdominal pain.  She has been taking Abx for the past 2 weeks.  Left message on her voicemail that per Dr. Baker, if her abdominal pain and nausea persists, a CT Scan may be ordered.  Phone number given for her to call back.

## 2020-02-07 ENCOUNTER — TELEPHONE (OUTPATIENT)
Dept: SURGERY | Facility: CLINIC | Age: 55
End: 2020-02-07

## 2020-02-07 NOTE — TELEPHONE ENCOUNTER
Left message on patient's voicemail as a follow-up to her call earlier this week for c/o abdominal pain and nausea.  She has had recurring abdominal wall abscesses.  If she is still has abdominal pain and nausea, per Dr. Baker, a CT Abd/Pelv with PO Contrast Only will be ordered and scheduled at [facility].  Phone number given for her to call back.

## 2020-11-06 ENCOUNTER — TELEPHONE (OUTPATIENT)
Dept: SURGERY | Facility: CLINIC | Age: 55
End: 2020-11-06

## 2020-11-06 NOTE — TELEPHONE ENCOUNTER
Left message on patient's voicemail that call was returned.  Phone number given for her to call back.

## 2020-11-06 NOTE — TELEPHONE ENCOUNTER
----- Message from Ovidio Awad sent at 11/6/2020  9:52 AM CST -----  Patient called to speak w/ someone to confirm receipt of CT images she states were to be sent from  office, requesting callback for confirmation [phone]

## 2020-11-11 ENCOUNTER — OFFICE VISIT (OUTPATIENT)
Dept: SURGERY | Facility: CLINIC | Age: 55
End: 2020-11-11
Payer: MEDICARE

## 2020-11-11 VITALS
OXYGEN SATURATION: 96 % | DIASTOLIC BLOOD PRESSURE: 94 MMHG | HEART RATE: 69 BPM | TEMPERATURE: 98 F | SYSTOLIC BLOOD PRESSURE: 191 MMHG

## 2020-11-11 DIAGNOSIS — L02.211 ABDOMINAL WALL ABSCESS: Primary | ICD-10-CM

## 2020-11-11 PROCEDURE — 99212 OFFICE O/P EST SF 10 MIN: CPT | Mod: PBBFAC | Performed by: SURGERY

## 2020-11-11 PROCEDURE — 99999 PR PBB SHADOW E&M-EST. PATIENT-LVL II: ICD-10-PCS | Mod: PBBFAC,,, | Performed by: SURGERY

## 2020-11-11 PROCEDURE — 99213 PR OFFICE/OUTPT VISIT, EST, LEVL III, 20-29 MIN: ICD-10-PCS | Mod: S$PBB,,, | Performed by: SURGERY

## 2020-11-11 PROCEDURE — 99213 OFFICE O/P EST LOW 20 MIN: CPT | Mod: S$PBB,,, | Performed by: SURGERY

## 2020-11-11 PROCEDURE — 99999 PR PBB SHADOW E&M-EST. PATIENT-LVL II: CPT | Mod: PBBFAC,,, | Performed by: SURGERY

## 2020-12-02 ENCOUNTER — TELEPHONE (OUTPATIENT)
Dept: SURGERY | Facility: CLINIC | Age: 55
End: 2020-12-02

## 2020-12-02 DIAGNOSIS — K43.9 VENTRAL HERNIA WITHOUT OBSTRUCTION OR GANGRENE: Primary | ICD-10-CM

## 2020-12-02 DIAGNOSIS — R10.9 ABDOMINAL PAIN, UNSPECIFIED ABDOMINAL LOCATION: ICD-10-CM

## 2020-12-02 NOTE — TELEPHONE ENCOUNTER
Left message on patient's voicemail that call was returned and that a CT Scan has been ordered and that she should call back to schedule CT Scan in [place].

## 2020-12-02 NOTE — TELEPHONE ENCOUNTER
----- Message from Sae Lopes sent at 12/2/2020 10:06 AM CST -----  Regarding: hernia is very painful      The Pt states that she now has a lot of pain from her hernia which she didn't have before and would like to see if you would order a CT Scan to make sure everything is ok and there isn't any more hernias.    Phone # [phone]

## 2020-12-04 ENCOUNTER — HOSPITAL ENCOUNTER (OUTPATIENT)
Dept: RADIOLOGY | Facility: HOSPITAL | Age: 55
Discharge: HOME | End: 2020-12-04
Attending: SURGERY
Payer: MEDICARE

## 2020-12-04 ENCOUNTER — TELEPHONE (OUTPATIENT)
Dept: SURGERY | Facility: CLINIC | Age: 55
End: 2020-12-04

## 2020-12-04 DIAGNOSIS — K43.9 VENTRAL HERNIA WITHOUT OBSTRUCTION OR GANGRENE: ICD-10-CM

## 2020-12-04 DIAGNOSIS — R10.9 ABDOMINAL PAIN, UNSPECIFIED ABDOMINAL LOCATION: ICD-10-CM

## 2020-12-04 PROCEDURE — 74176 CT ABDOMEN PELVIS WITHOUT CONTRAST: ICD-10-PCS | Mod: 26,,, | Performed by: RADIOLOGY

## 2020-12-04 PROCEDURE — 74176 CT ABD & PELVIS W/O CONTRAST: CPT | Mod: TC

## 2020-12-04 PROCEDURE — 74176 CT ABD & PELVIS W/O CONTRAST: CPT | Mod: 26,,, | Performed by: RADIOLOGY

## 2020-12-04 NOTE — TELEPHONE ENCOUNTER
Pt called requesting that a STAT CT Scan order be placed for her to have her CT Scan prior to 12/9/2020 due to increasing abdominal pain.  Order placed and CT Scan scheduled for today 12/4/2020 at 1:30pm in [place].  She is aware of appt date, time, and location and that she is to NPO starting now and to arrive at 12:30pm.

## 2020-12-04 NOTE — TELEPHONE ENCOUNTER
----- Message from Jayshree Murillo sent at 12/4/2020  8:56 AM CST -----  Regarding: STAT CT  Contact: Patient Called [phone]  Calling to see if a STAT CT Abdomen Pelvis Without Contrast Order can be sent to      [facility], Radiology    [address] [place] [address]    She is scheduled on 12/9/20 to have CT done at that location and was informed that if it is put in as a STAT that they would be able to see patient in earlier than 12/9/20 without approval from Insurance.  Patient also states that she has started running a fever, and her stomach is aching her, itchy, and soar.

## 2020-12-08 ENCOUNTER — TELEPHONE (OUTPATIENT)
Dept: SURGERY | Facility: CLINIC | Age: 55
End: 2020-12-08

## 2020-12-08 NOTE — TELEPHONE ENCOUNTER
----- Message from Ovidio Awad sent at 12/8/2020  1:29 PM CST -----  Patient called to speak w/ someone regarding the results from her 12/4 CT, states she's currently experiencing severe abdominal pain, requesting callback to discuss the matter [phone]

## 2020-12-08 NOTE — TELEPHONE ENCOUNTER
Left message on patient's voicemail that call was returned and notified her after Dr. Baker's review of CT Scan:  Persistent abdominal wall fluid collection with flank hernia.  He would like to have her RTC to discuss treatment.  If she continues to have severe abdominal pain, develops a fever, or nausea/vomiting, she should go to the ER for evaluation.  Phone number given for her to call back.

## 2020-12-10 ENCOUNTER — TELEPHONE (OUTPATIENT)
Dept: SURGERY | Facility: CLINIC | Age: 55
End: 2020-12-10

## 2020-12-10 NOTE — TELEPHONE ENCOUNTER
Notified patient that Dr. Baker did review her CT Scan and it demonstrates a persistent abdominal fluid collection and left flank hernia.  Appt scheduled for 12/16/2020 at 10:15am to discuss hernia repair and management of fluid collection.  She reports fever and elevated WBCs per her PCP.  She will go to the ER for evaluation of continued fever that is not controlled with OTC Tylenol.  She verbalized understanding and is aware of appt date and time.

## 2020-12-10 NOTE — TELEPHONE ENCOUNTER
----- Message from Ovidio Awad sent at 12/10/2020  8:24 AM CST -----  Patient called to speak w/ someone to confirm receipt of CT images she states were to be sent from  office, requesting callback for confirmation(states she's made multiple attempts to reach someone unsuccessfully) [phone]

## 2020-12-16 ENCOUNTER — OFFICE VISIT (OUTPATIENT)
Dept: SURGERY | Facility: CLINIC | Age: 55
DRG: 863 | End: 2020-12-16
Payer: MEDICARE

## 2020-12-16 ENCOUNTER — HOSPITAL ENCOUNTER (INPATIENT)
Facility: HOSPITAL | Age: 55
LOS: 2 days | Discharge: HOME | DRG: 863 | End: 2020-12-18
Attending: EMERGENCY MEDICINE | Admitting: SURGERY
Payer: MEDICARE

## 2020-12-16 VITALS
TEMPERATURE: 98 F | DIASTOLIC BLOOD PRESSURE: 76 MMHG | SYSTOLIC BLOOD PRESSURE: 146 MMHG | OXYGEN SATURATION: 94 % | HEART RATE: 69 BPM

## 2020-12-16 DIAGNOSIS — T81.49XA ABDOMINAL WALL ABSCESS AT SITE OF SURGICAL WOUND: Primary | ICD-10-CM

## 2020-12-16 DIAGNOSIS — K65.1 INTRA-ABDOMINAL ABSCESS: Primary | ICD-10-CM

## 2020-12-16 LAB
ALBUMIN SERPL BCP-MCNC: 3.4 G/DL (ref 3.5–5.2)
ALP SERPL-CCNC: 128 U/L (ref 55–135)
ALT SERPL W/O P-5'-P-CCNC: 14 U/L (ref 10–44)
ANION GAP SERPL CALC-SCNC: 11 MMOL/L (ref 8–16)
AST SERPL-CCNC: 15 U/L (ref 10–40)
BASOPHILS # BLD AUTO: 0.06 K/UL (ref 0–0.2)
BASOPHILS NFR BLD: 0.4 % (ref 0–1.9)
BILIRUB SERPL-MCNC: 0.6 MG/DL (ref 0.1–1)
BILIRUB UR QL STRIP: NEGATIVE
BUN SERPL-MCNC: 13 MG/DL (ref 6–20)
CALCIUM SERPL-MCNC: 9.5 MG/DL (ref 8.7–10.5)
CHLORIDE SERPL-SCNC: 99 MMOL/L (ref 95–110)
CLARITY UR REFRACT.AUTO: CLEAR
CO2 SERPL-SCNC: 25 MMOL/L (ref 23–29)
COLOR UR AUTO: YELLOW
CREAT SERPL-MCNC: 1 MG/DL (ref 0.5–1.4)
CTP QC/QA: YES
DIFFERENTIAL METHOD: ABNORMAL
EOSINOPHIL # BLD AUTO: 0.2 K/UL (ref 0–0.5)
EOSINOPHIL NFR BLD: 1.7 % (ref 0–8)
ERYTHROCYTE [DISTWIDTH] IN BLOOD BY AUTOMATED COUNT: 12.1 % (ref 11.5–14.5)
EST. GFR  (AFRICAN AMERICAN): >60 ML/MIN/1.73 M^2
EST. GFR  (NON AFRICAN AMERICAN): >60 ML/MIN/1.73 M^2
GLUCOSE SERPL-MCNC: 110 MG/DL (ref 70–110)
GLUCOSE UR QL STRIP: NEGATIVE
HCT VFR BLD AUTO: 40.4 % (ref 37–48.5)
HGB BLD-MCNC: 13.5 G/DL (ref 12–16)
HGB UR QL STRIP: NEGATIVE
IMM GRANULOCYTES # BLD AUTO: 0.05 K/UL (ref 0–0.04)
IMM GRANULOCYTES NFR BLD AUTO: 0.4 % (ref 0–0.5)
INR PPP: 1 (ref 0.8–1.2)
KETONES UR QL STRIP: NEGATIVE
LACTATE SERPL-SCNC: 1.6 MMOL/L (ref 0.5–2.2)
LEUKOCYTE ESTERASE UR QL STRIP: NEGATIVE
LYMPHOCYTES # BLD AUTO: 2.4 K/UL (ref 1–4.8)
LYMPHOCYTES NFR BLD: 18 % (ref 18–48)
MAGNESIUM SERPL-MCNC: 1.7 MG/DL (ref 1.6–2.6)
MCH RBC QN AUTO: 29.6 PG (ref 27–31)
MCHC RBC AUTO-ENTMCNC: 33.4 G/DL (ref 32–36)
MCV RBC AUTO: 89 FL (ref 82–98)
MONOCYTES # BLD AUTO: 0.8 K/UL (ref 0.3–1)
MONOCYTES NFR BLD: 5.7 % (ref 4–15)
NEUTROPHILS # BLD AUTO: 9.9 K/UL (ref 1.8–7.7)
NEUTROPHILS NFR BLD: 73.8 % (ref 38–73)
NITRITE UR QL STRIP: NEGATIVE
NRBC BLD-RTO: 0 /100 WBC
PH UR STRIP: 6 [PH] (ref 5–8)
PLATELET # BLD AUTO: 357 K/UL (ref 150–350)
PMV BLD AUTO: 9 FL (ref 9.2–12.9)
POTASSIUM SERPL-SCNC: 3.8 MMOL/L (ref 3.5–5.1)
PROT SERPL-MCNC: 8.4 G/DL (ref 6–8.4)
PROT UR QL STRIP: NEGATIVE
PROTHROMBIN TIME: 10.9 SEC (ref 9–12.5)
RBC # BLD AUTO: 4.56 M/UL (ref 4–5.4)
SARS-COV-2 RDRP RESP QL NAA+PROBE: NEGATIVE
SODIUM SERPL-SCNC: 135 MMOL/L (ref 136–145)
SP GR UR STRIP: 1.01 (ref 1–1.03)
URN SPEC COLLECT METH UR: NORMAL
WBC # BLD AUTO: 13.44 K/UL (ref 3.9–12.7)

## 2020-12-16 PROCEDURE — 80053 COMPREHEN METABOLIC PANEL: CPT

## 2020-12-16 PROCEDURE — 83605 ASSAY OF LACTIC ACID: CPT

## 2020-12-16 PROCEDURE — 99285 PR EMERGENCY DEPT VISIT,LEVEL V: ICD-10-PCS | Mod: CS,,, | Performed by: PHYSICIAN ASSISTANT

## 2020-12-16 PROCEDURE — 81003 URINALYSIS AUTO W/O SCOPE: CPT

## 2020-12-16 PROCEDURE — 63600175 PHARM REV CODE 636 W HCPCS: Performed by: PHYSICIAN ASSISTANT

## 2020-12-16 PROCEDURE — 99212 OFFICE O/P EST SF 10 MIN: CPT | Mod: PBBFAC | Performed by: SURGERY

## 2020-12-16 PROCEDURE — U0002 COVID-19 LAB TEST NON-CDC: HCPCS | Performed by: EMERGENCY MEDICINE

## 2020-12-16 PROCEDURE — 25000003 PHARM REV CODE 250: Performed by: STUDENT IN AN ORGANIZED HEALTH CARE EDUCATION/TRAINING PROGRAM

## 2020-12-16 PROCEDURE — 87040 BLOOD CULTURE FOR BACTERIA: CPT | Mod: 59

## 2020-12-16 PROCEDURE — 25000003 PHARM REV CODE 250: Performed by: PHYSICIAN ASSISTANT

## 2020-12-16 PROCEDURE — 99213 PR OFFICE/OUTPT VISIT, EST, LEVL III, 20-29 MIN: ICD-10-PCS | Mod: S$PBB,,, | Performed by: SURGERY

## 2020-12-16 PROCEDURE — 99285 EMERGENCY DEPT VISIT HI MDM: CPT | Mod: 27

## 2020-12-16 PROCEDURE — 85025 COMPLETE CBC W/AUTO DIFF WBC: CPT

## 2020-12-16 PROCEDURE — 99213 OFFICE O/P EST LOW 20 MIN: CPT | Mod: S$PBB,,, | Performed by: SURGERY

## 2020-12-16 PROCEDURE — 85610 PROTHROMBIN TIME: CPT

## 2020-12-16 PROCEDURE — 12000002 HC ACUTE/MED SURGE SEMI-PRIVATE ROOM

## 2020-12-16 PROCEDURE — 63600175 PHARM REV CODE 636 W HCPCS: Performed by: STUDENT IN AN ORGANIZED HEALTH CARE EDUCATION/TRAINING PROGRAM

## 2020-12-16 PROCEDURE — 99285 EMERGENCY DEPT VISIT HI MDM: CPT | Mod: CS,,, | Performed by: PHYSICIAN ASSISTANT

## 2020-12-16 PROCEDURE — 99999 PR PBB SHADOW E&M-EST. PATIENT-LVL II: CPT | Mod: PBBFAC,,, | Performed by: SURGERY

## 2020-12-16 PROCEDURE — 83735 ASSAY OF MAGNESIUM: CPT

## 2020-12-16 PROCEDURE — 99999 PR PBB SHADOW E&M-EST. PATIENT-LVL II: ICD-10-PCS | Mod: PBBFAC,,, | Performed by: SURGERY

## 2020-12-16 RX ORDER — DIPHENHYDRAMINE HCL 25 MG
25 CAPSULE ORAL EVERY 6 HOURS PRN
Status: DISCONTINUED | OUTPATIENT
Start: 2020-12-16 | End: 2020-12-18 | Stop reason: HOSPADM

## 2020-12-16 RX ORDER — ONDANSETRON 2 MG/ML
4 INJECTION INTRAMUSCULAR; INTRAVENOUS EVERY 6 HOURS PRN
Status: DISCONTINUED | OUTPATIENT
Start: 2020-12-16 | End: 2020-12-18 | Stop reason: HOSPADM

## 2020-12-16 RX ORDER — DULOXETIN HYDROCHLORIDE 60 MG/1
60 CAPSULE, DELAYED RELEASE ORAL DAILY
COMMUNITY

## 2020-12-16 RX ORDER — ENOXAPARIN SODIUM 100 MG/ML
40 INJECTION SUBCUTANEOUS EVERY 24 HOURS
Status: DISCONTINUED | OUTPATIENT
Start: 2020-12-16 | End: 2020-12-18 | Stop reason: HOSPADM

## 2020-12-16 RX ORDER — ACETAMINOPHEN 325 MG/1
650 TABLET ORAL EVERY 4 HOURS PRN
Status: DISCONTINUED | OUTPATIENT
Start: 2020-12-16 | End: 2020-12-18 | Stop reason: HOSPADM

## 2020-12-16 RX ORDER — ONDANSETRON 2 MG/ML
8 INJECTION INTRAMUSCULAR; INTRAVENOUS
Status: COMPLETED | OUTPATIENT
Start: 2020-12-16 | End: 2020-12-16

## 2020-12-16 RX ORDER — ATENOLOL AND CHLORTHALIDONE TABLET 100; 25 MG/1; MG/1
1 TABLET ORAL DAILY
COMMUNITY

## 2020-12-16 RX ORDER — SODIUM CHLORIDE 0.9 % (FLUSH) 0.9 %
10 SYRINGE (ML) INJECTION
Status: DISCONTINUED | OUTPATIENT
Start: 2020-12-16 | End: 2020-12-18 | Stop reason: HOSPADM

## 2020-12-16 RX ORDER — LIDOCAINE HYDROCHLORIDE 10 MG/ML
1 INJECTION, SOLUTION EPIDURAL; INFILTRATION; INTRACAUDAL; PERINEURAL ONCE
Status: DISCONTINUED | OUTPATIENT
Start: 2020-12-16 | End: 2020-12-17

## 2020-12-16 RX ORDER — VANCOMYCIN HCL IN 5 % DEXTROSE 1G/250ML
1000 PLASTIC BAG, INJECTION (ML) INTRAVENOUS
Status: DISCONTINUED | OUTPATIENT
Start: 2020-12-16 | End: 2020-12-16 | Stop reason: SDUPTHER

## 2020-12-16 RX ORDER — ACETAMINOPHEN 325 MG/1
650 TABLET ORAL EVERY 8 HOURS PRN
Status: DISCONTINUED | OUTPATIENT
Start: 2020-12-16 | End: 2020-12-18 | Stop reason: HOSPADM

## 2020-12-16 RX ORDER — PANTOPRAZOLE SODIUM 40 MG/1
40 TABLET, DELAYED RELEASE ORAL DAILY
Status: DISCONTINUED | OUTPATIENT
Start: 2020-12-16 | End: 2020-12-18 | Stop reason: HOSPADM

## 2020-12-16 RX ORDER — ESCITALOPRAM OXALATE 20 MG/1
20 TABLET ORAL DAILY
Status: DISCONTINUED | OUTPATIENT
Start: 2020-12-16 | End: 2020-12-18 | Stop reason: HOSPADM

## 2020-12-16 RX ORDER — ROSUVASTATIN CALCIUM 10 MG/1
10 TABLET, COATED ORAL DAILY
Status: DISCONTINUED | OUTPATIENT
Start: 2020-12-16 | End: 2020-12-18 | Stop reason: HOSPADM

## 2020-12-16 RX ORDER — OXYCODONE HYDROCHLORIDE 5 MG/1
5 TABLET ORAL EVERY 6 HOURS PRN
Status: DISCONTINUED | OUTPATIENT
Start: 2020-12-16 | End: 2020-12-18

## 2020-12-16 RX ORDER — HYDROCODONE BITARTRATE AND ACETAMINOPHEN 5; 325 MG/1; MG/1
1 TABLET ORAL
Status: COMPLETED | OUTPATIENT
Start: 2020-12-16 | End: 2020-12-16

## 2020-12-16 RX ADMIN — VANCOMYCIN HYDROCHLORIDE 2000 MG: 100 INJECTION, POWDER, LYOPHILIZED, FOR SOLUTION INTRAVENOUS at 01:12

## 2020-12-16 RX ADMIN — ESCITALOPRAM 20 MG: 5 TABLET, FILM COATED ORAL at 12:12

## 2020-12-16 RX ADMIN — ONDANSETRON 8 MG: 2 INJECTION INTRAMUSCULAR; INTRAVENOUS at 12:12

## 2020-12-16 RX ADMIN — PIPERACILLIN AND TAZOBACTAM 4.5 G: 4; .5 INJECTION, POWDER, LYOPHILIZED, FOR SOLUTION INTRAVENOUS; PARENTERAL at 08:12

## 2020-12-16 RX ADMIN — PANTOPRAZOLE SODIUM 40 MG: 40 TABLET, DELAYED RELEASE ORAL at 12:12

## 2020-12-16 RX ADMIN — ENOXAPARIN SODIUM 40 MG: 40 INJECTION SUBCUTANEOUS at 04:12

## 2020-12-16 RX ADMIN — SODIUM CHLORIDE 1000 ML: 0.9 INJECTION, SOLUTION INTRAVENOUS at 12:12

## 2020-12-16 RX ADMIN — ROSUVASTATIN CALCIUM 10 MG: 10 TABLET, FILM COATED ORAL at 12:12

## 2020-12-16 RX ADMIN — HYDROCODONE BITARTRATE AND ACETAMINOPHEN 1 TABLET: 5; 325 TABLET ORAL at 12:12

## 2020-12-16 RX ADMIN — PIPERACILLIN AND TAZOBACTAM 4.5 G: 4; .5 INJECTION, POWDER, LYOPHILIZED, FOR SOLUTION INTRAVENOUS; PARENTERAL at 12:12

## 2020-12-16 NOTE — PROGRESS NOTES
General Surgery  History and Physical    SUBJECTIVE:     History of Present Illness:  Barbara Ortiz is a 55 y.o. female well known to Dr. Baker's service who presents for follow up of a recurrent mesh infection. She has a history of colostomy and underwent reversal with incisional hernia repair 4/2018. Then underwent hernia repair with mesh 9/2018 c/b abscess formation s/p I&D 10/2018. Has since had abscess recurrence and has had to undergo IR drainage as well. She has been lost to follow up for about a year until she returned today.     Reports that over the last couple weeks she noticed her stomach started to enlarge and she was having increased pain, tenderness, and warmth at that site. She also reports having fevers at home of 102. She underwent CT abd/pelvis on 12/4/2020 which revealed a large abscess at the same site as prior along with a known fat containing hernia. She has arrived from [place] with her landlord for transportation.     No chief complaint on file.      Review of patient's allergies indicates:   Allergen Reactions    Dilaudid [hydromorphone] Itching    Fentanyl Itching    Versed [midazolam] Itching       Current Outpatient Medications   Medication Sig Dispense Refill    atenolol-chlorthalidone (TENORETIC) 50-25 mg Tab Take 1 tablet by mouth once daily.      escitalopram oxalate (LEXAPRO) 20 MG tablet Take 20 mg by mouth.      estradiol (ESTRACE) 1 MG tablet Take 1 mg by mouth once daily.       fluconazole (DIFLUCAN) 150 MG Tab       gabapentin (NEURONTIN) 300 MG capsule Take 1 capsule (300 mg total) by mouth 3 (three) times daily. 90 capsule 11    hydrOXYzine pamoate (VISTARIL) 25 MG Cap Take 25 mg by mouth every 8 (eight) hours as needed.       losartan (COZAAR) 100 MG tablet Take 0.5 tablets (50 mg total) by mouth every evening. (Patient taking differently: Take 100 mg by mouth once daily. ) 15 tablet 0    metFORMIN (GLUCOPHAGE) 500 MG tablet Take 500 mg by mouth 2 (two) times  daily with meals.      omeprazole (PRILOSEC) 40 MG capsule Take 40 mg by mouth.      ondansetron (ZOFRAN) 8 MG tablet Take 8 mg by mouth every 8 (eight) hours as needed for Nausea.      rosuvastatin (CRESTOR) 10 MG tablet Take 10 mg by mouth once daily.      traMADol (ULTRAM) 50 mg tablet Take 1 tablet (50 mg total) by mouth every 6 (six) hours as needed for Pain. 21 tablet 0    traZODone (DESYREL) 50 MG tablet Take 50 mg by mouth nightly as needed.        No current facility-administered medications for this visit.        Past Medical History:   Diagnosis Date    Anxiety     Baker's cyst of knee, left     Diabetes mellitus     Diverticulitis     Fibromyalgia     Hypertension     Sleep apnea      Past Surgical History:   Procedure Laterality Date    CARPAL TUNNEL RELEASE Right     CHOLECYSTECTOMY      COLOSTOMY      COLOSTOMY CLOSURE  04/10/2018    HERNIA REPAIR  04/10/2018    LUMBAR FUSION      PARTIAL HYSTERECTOMY  2002    REPLACEMENT OF NEPHROSTOMY TUBE N/A 10/14/2019    Procedure: REPLACEMENT, NEPHROSTOMY TUBE;  Surgeon: Stefan Diagnostic Provider;  Location: [facility] OR [address] [place] [address];  Service: Anesthesiology;  Laterality: N/A;  189     No family history on file.  Social History     Tobacco Use    Smoking status: Never Smoker    Smokeless tobacco: Never Used   Substance Use Topics    Alcohol use: Yes     Comment: occasionally    Drug use: No        Review of Systems:  Review of Systems   Constitutional: Positive for fever. Negative for chills.   HENT: Negative for congestion and rhinorrhea.    Eyes: Negative for photophobia and visual disturbance.   Respiratory: Negative for cough and shortness of breath.    Cardiovascular: Negative for chest pain and palpitations.   Gastrointestinal: Positive for abdominal pain. Negative for constipation, diarrhea, nausea and vomiting.   Endocrine: Negative for cold intolerance and heat intolerance.   Musculoskeletal: Negative for arthralgias and myalgias.   Skin:  Negative for rash and wound.   Allergic/Immunologic: Negative for immunocompromised state.   Neurological: Negative for tremors and weakness.   Hematological: Negative for adenopathy.   Psychiatric/Behavioral: Negative for agitation.       OBJECTIVE:     Vital Signs (Most Recent)  Temp: 98.3 °F (36.8 °C) (12/16/20 1046)  Pulse: 69 (12/16/20 1046)  BP: (!) 146/76 (12/16/20 1046)  SpO2: (!) 94 % (12/16/20 1046)           Physical Exam:  Physical Exam  Constitutional:       General: She is not in acute distress.     Appearance: She is well-developed.   HENT:      Head: Normocephalic and atraumatic.   Eyes:      General: No scleral icterus.  Neck:      Musculoskeletal: Normal range of motion and neck supple.   Cardiovascular:      Rate and Rhythm: Normal rate and regular rhythm.   Pulmonary:      Effort: Pulmonary effort is normal. No respiratory distress.   Abdominal:      Comments: Soft, obese  Well healed surgical scars. Midline with an area of induration which is tender and fluctuant consistent with abscess   Musculoskeletal: Normal range of motion.         General: No tenderness.   Skin:     General: Skin is warm and dry.   Neurological:      Mental Status: She is alert and oriented to person, place, and time.       Diagnostics  1. Postoperative changes of prior ventral hernia repair with mesh again demonstrated.  Persistent large complex collection within the midline anterior abdominal wall musculature concerning for a residual abscess, mildly increased in size since the prior study.  Interval removal of previously demonstrated pigtail catheter within a more superficial subcutaneous collection.  2. Postoperative changes of posterior instrumented fusion at L4-5 with minimal perihardware lucency about the bilateral L5 screws, which may reflect some loosening or infection.  3. Hepatomegaly and hepatic steatosis.  4. Postoperative changes of prior cholecystectomy, hysterectomy, appendectomy and partial colectomy.  5.  Stable nonspecific 3.5 cm cystic left adnexal structure, possibly an ovarian cyst, not significant changed since 02/19/2019.  Further characterization with pelvic ultrasound could be performed.  6. Fat-containing left paramedian ventral hernia.  This report was flagged in Epic as abnormal.    ASSESSMENT/PLAN:   55 yo female with a history of hernia repair with mesh now with recurrent abdominal wall abscesses.     - Given the abscess found on CT on 12/4 and her symptoms, discussed admission to the hospital for IR drainage of the abscess and IV antibiotics  - She is amenable to this  - Will have her be admitted through the ED (as she needs a COVID test)   - Once inpatient, okay for regular diet, NPO at midnight. Broad spectrum IV antibiotics (has grown MSSA in the past). IR for drainage.     Jessica Espinal MD  General Surgery, PGY-2  [phone]

## 2020-12-16 NOTE — ED NOTES
"Patient states she is in ED to be admitted for IV antibiotics, states she is supposed to possibly have surgery tomorrow for a "sac of infection" and hernia. Denies fevers. Positive nausea, no emesis, states abd pain to palpation and  movement.   "

## 2020-12-16 NOTE — CONSULTS
Radiology Consult    Barbara Ortiz is a 55 y.o. female with a history of colostomy and underwent reversal with incisional hernia repair 4/2018.  She had hernia repair with mesh on 09/2018 c/b recurrent abscess. The most recent CT showed large complex collection within the midline anterior abdominal wall concerning for abscess. IR is consulted for drainage cath placement.     Past Medical History:   Diagnosis Date    Anxiety     Baker's cyst of knee, left     Diabetes mellitus     Diverticulitis     Fibromyalgia     Hypertension     Sleep apnea      Past Surgical History:   Procedure Laterality Date    BRAIN SURGERY      CARPAL TUNNEL RELEASE Right     CHOLECYSTECTOMY      COLOSTOMY      COLOSTOMY CLOSURE  04/10/2018    HERNIA REPAIR  04/10/2018    LUMBAR FUSION      PARTIAL HYSTERECTOMY  2002    REPLACEMENT OF NEPHROSTOMY TUBE N/A 10/14/2019    Procedure: REPLACEMENT, NEPHROSTOMY TUBE;  Surgeon: [facility]ramón Diagnostic Provider;  Location: [facility] OR [address] [place] [address];  Service: Anesthesiology;  Laterality: N/A;  189       Discussed with primary team.    Imaging reviewed with Radiology staff, Dr. Rajendra MD.     Procedure: drainage catheter placement.    Scheduled Meds:    enoxaparin  40 mg Subcutaneous Q24H    escitalopram oxalate  20 mg Oral Daily    lidocaine (PF) 10 mg/ml (1%)  1 mL Other Once    pantoprazole  40 mg Oral Daily    piperacillin-tazobactam (ZOSYN) IVPB  4.5 g Intravenous Q8H    rosuvastatin  10 mg Oral Daily     Continuous Infusions:   PRN Meds:acetaminophen, acetaminophen, diphenhydrAMINE, ondansetron, oxyCODONE, sodium chloride 0.9%    Allergies:   Review of patient's allergies indicates:   Allergen Reactions    Losartan Anaphylaxis     Lips and tongue swelling    Dilaudid [hydromorphone] Itching    Fentanyl Itching    Versed [midazolam] Itching       Labs:  Recent Labs   Lab 12/16/20  1217   INR 1.0       Recent Labs   Lab 12/16/20  1217   WBC 13.44*   HGB 13.5   HCT 40.4   MCV 89   *       Recent Labs   Lab 12/16/20  1217      *   K 3.8   CL 99   CO2 25   BUN 13   CREATININE 1.0   CALCIUM 9.5   MG 1.7   ALT 14   AST 15   ALBUMIN 3.4*   BILITOT 0.6         Vitals (Most Recent):  Temp: 97.7 °F (36.5 °C) (12/16/20 1516)  Pulse: (!) 59 (12/16/20 1516)  Resp: 18 (12/16/20 1516)  BP: (!) 115/56 (12/16/20 1516)  SpO2: 98 % (12/16/20 1516)    Plan:   1. NPO after midnight.  2. Hold any non vital anticoagulants.  3. Imaging guided drainage catheter placement in the fluid collection along the anterior abdominal wall is scheduled for tomorrow.    Diana Santamaria MD  Radiology Department/ PGY-3  [facility]

## 2020-12-16 NOTE — ED NOTES
Patient identifiers verified and correct for Ms Ortiz  C/C: ABd pain, possible surgery SEE NN  APPEARANCE: awake and alert in NAD.  SKIN: warm, dry and intact. No breakdown or bruising.  MUSCULOSKELETAL: Patient moving all extremities spontaneously, no obvious swelling or deformities noted. Ambulates independently.  RESPIRATORY: Denies shortness of breath.Respirations unlabored. Denies fevers  CARDIAC: Denies CP, 2+ distal pulses; no peripheral edema  ABDOMEN: ABdomen round, positive nausea, no emesis, denies diarrhea.   : voids spontaneously, denies difficulty  Neurologic: AAO x 4; follows commands equal strength in all extremities; denies numbness/tingling. Denies dizziness Denies weakness

## 2020-12-16 NOTE — ED PROVIDER NOTES
Encounter Date: 12/16/2020       History     Chief Complaint   Patient presents with    Abdominal Pain     dr lerma told me to come get covid test for admission, infected abd mesh and hernia     55-year-old female with dm 2, hypertension, anxiety and history of reversed colostomy with incisional hernia presents for admission for intra-abdominal infection.  The patient has been having epigastric abdominal pain over the past 3 weeks with minimal improvement with ibuprofen.  She noticed a bulge in and warmth in that spot of her abdomen and had a fever of 102° F last week.  She has also been nauseous without any vomiting.  She had a CT scan performed 12/05/2020 which showed a large fluid collection consistent with intra-abdominal abscess/infected mesh.  She was seen by her surgeon in the clinic earlier today and sent down to the ED for admission.  She denies changes in bowel movements, chest pain, shortness of breath or urinary symptoms.        Review of patient's allergies indicates:   Allergen Reactions    Losartan Anaphylaxis     Lips and tongue swelling    Dilaudid [hydromorphone] Itching    Fentanyl Itching    Versed [midazolam] Itching     Past Medical History:   Diagnosis Date    Anxiety     Baker's cyst of knee, left     Diabetes mellitus     Diverticulitis     Fibromyalgia     Hypertension     Sleep apnea      Past Surgical History:   Procedure Laterality Date    BRAIN SURGERY      CARPAL TUNNEL RELEASE Right     CHOLECYSTECTOMY      COLOSTOMY      COLOSTOMY CLOSURE  04/10/2018    HERNIA REPAIR  04/10/2018    LUMBAR FUSION      PARTIAL HYSTERECTOMY  2002    REPLACEMENT OF NEPHROSTOMY TUBE N/A 10/14/2019    Procedure: REPLACEMENT, NEPHROSTOMY TUBE;  Surgeon: Stefan Diagnostic Provider;  Location: [facility] OR [address] [place] [address];  Service: Anesthesiology;  Laterality: N/A;  189     History reviewed. No pertinent family history.  Social History     Tobacco Use    Smoking status: Never Smoker    Smokeless  tobacco: Never Used   Substance Use Topics    Alcohol use: Yes     Comment: occasionally    Drug use: No     Review of Systems   Constitutional: Positive for chills and fever. Negative for fatigue.   HENT: Negative for sore throat.    Respiratory: Negative for shortness of breath.    Cardiovascular: Negative for chest pain.   Gastrointestinal: Positive for abdominal pain and nausea. Negative for abdominal distention, anal bleeding, blood in stool, constipation, diarrhea, rectal pain and vomiting.   Genitourinary: Negative for dysuria, flank pain and hematuria.   Musculoskeletal: Negative for back pain.   Skin: Negative for rash.   Allergic/Immunologic: Negative for immunocompromised state.   Neurological: Negative for weakness.   Hematological: Does not bruise/bleed easily.       Physical Exam     Initial Vitals [12/16/20 1122]   BP Pulse Resp Temp SpO2   120/73 68 18 98.8 °F (37.1 °C) 100 %      MAP       --         Physical Exam    Nursing note and vitals reviewed.  Constitutional: She appears well-developed and well-nourished. She is not diaphoretic. No distress.   HENT:   Head: Normocephalic and atraumatic.   Eyes: EOM are normal. Pupils are equal, round, and reactive to light.   Neck: Normal range of motion.   Cardiovascular: Normal rate, regular rhythm, normal heart sounds and intact distal pulses. Exam reveals no gallop and no friction rub.    No murmur heard.  Pulmonary/Chest: Breath sounds normal. No respiratory distress. She has no wheezes. She has no rhonchi. She has no rales. She exhibits no tenderness.   Abdominal: Soft. Bowel sounds are normal. She exhibits distension. She exhibits no mass. There is abdominal tenderness in the epigastric area. There is guarding. There is no rigidity, no rebound, no CVA tenderness, no tenderness at McBurney's point and negative Lezama's sign.   Upper abdomen is distended, firm, tender and warm to the touch.  There is a palpable, firm mass.   Musculoskeletal: Normal  range of motion.   Neurological: She is alert and oriented to person, place, and time.   Skin: Skin is warm and dry.   Psychiatric: She has a normal mood and affect.         ED Course   Procedures  Labs Reviewed   COMPREHENSIVE METABOLIC PANEL - Abnormal; Notable for the following components:       Result Value    Sodium 135 (*)     Albumin 3.4 (*)     All other components within normal limits   CBC W/ AUTO DIFFERENTIAL - Abnormal; Notable for the following components:    WBC 13.44 (*)     Platelets 357 (*)     MPV 9.0 (*)     Gran # (ANC) 9.9 (*)     Immature Grans (Abs) 0.05 (*)     Gran % 73.8 (*)     All other components within normal limits   CULTURE, BLOOD   CULTURE, BLOOD   MAGNESIUM   PROTIME-INR   LACTIC ACID, PLASMA   URINALYSIS, REFLEX TO URINE CULTURE    Narrative:     Specimen Source->Urine   SARS-COV-2 RDRP GENE    Narrative:     This test utilizes isothermal nucleic acid amplification   technology to detect the SARS-CoV-2 RdRp nucleic acid segment.   The analytical sensitivity (limit of detection) is 125 genome   equivalents/mL.   A POSITIVE result implies infection with the SARS-CoV-2 virus;   the patient is presumed to be contagious.     A NEGATIVE result means that SARS-CoV-2 nucleic acids are not   present above the limit of detection. A NEGATIVE result should be   treated as presumptive. It does not rule out the possibility of   COVID-19 and should not be the sole basis for treatment decisions.   If COVID-19 is strongly suspected based on clinical and exposure   history, re-testing using an alternate molecular assay should be   considered.   This test is only for use under the Food and Drug   Administration s Emergency Use Authorization (EUA).   Commercial kits are provided by [employer].   Performance characteristics of the EUA have been independently   verified by [facility] Department of   Pathology and Laboratory Medicine.    _________________________________________________________________   The authorized Fact Sheet for Healthcare Providers and the authorized Fact   Sheet for Patients of the ID NOW COVID-19 are available on the FDA   website:     https://www.fda.gov/media/821653/download  https://www.fda.gov/media/838170/download                Imaging Results    None          Medical Decision Making:   History:   Old Medical Records: I decided to obtain old medical records.  Old Records Summarized: records from clinic visits.       <> Summary of Records: Patient seen in general surgery clinic earlier today and referred to the ED for admission and COVID testing.  I reviewed her CT scan performed 12/05/2020 which showed a very large fluid collection concerning for intra-abdominal abscess.  Initial Assessment:   55-year-old female presenting for intra-abdominal abscess/infected mesh.  Vitals are normal and she appears uncomfortable but nontoxic.  She is a palpable, firm, tender mass in her upper abdomen which is warm to the touch.  Differential Diagnosis:   Intra-abdominal abscess  Abdominal wall cellulitis  She does not clinically appear septic  Seroma  I doubt COVID-19  Clinical Tests:   Lab Tests: Ordered and Reviewed  Radiological Study: Reviewed  ED Management:  Will check labs, give fluid bolus, Zosyn, vancomycin and discussed with General surgery for admission.  Other:   I have discussed this case with another health care provider.       <> Summary of the Discussion: I discussed this patient with General surgery, the patient will be admitted to their service.  Patient comfortable with admission.  I discussed this patient with my supervising physician              Attending Attestation:     Physician Attestation Statement for NP/PA:   I have conducted a face to face encounter with this patient in addition to the NP/PA, due to Medical Complexity    Other NP/PA Attestation Additions:    History of Present Illness: This is a  55-year-old female who has a history of colostomy with reversal, subsequent hernia with mesh repair, who now presents with what she reports as a bulge to her epigastrium that has been slowly progressively worsening and becoming more tender over multiple weeks.  She had the area imaged with CT.  I have reviewed all of these notes in epic including looking at the imaging itself, and upon my independent review and interpretation and radiology's interpretation the patient has an abscess intraabdominally.  She was directed to the emergency department from clinic for further evaluation and treatment.   Physical Exam: VS upon arrival:  Blood pressures in the 130s systolic with a heart rate of 60.  The patient appears nontoxic.  Consititutional: Pt is awake, alert, and oriented x 4. Does not appear to be in any kiara distress.  HEENT: PERRL; EOMI; nares patent; op clear; mmm without lesions.  Neck: Supple with good ROM.  CV: Normal rate; regular rhythm; no mrg. Heart sounds normal. No peripheral edema. 2+ radials bilateral and symmetric.  Respiratory: CTA bilaterally with no focal rales, ronchi, or wheezes.  GI: Abdomen soft, however to the epigastric area there is a prominent bulge that is firm and tender.  There is no rebound or guarding and bowel sounds are normal.  She has multiple well-healed scars to the abdomen.  MSK: No long bone deformities; no focal joint swellings.  Neuro: MAEW.   Skin: No skin lesions or rash.    Medical Decision Making: This is a 55-year-old female who presents to the emergency department with an abscess that involves previous mesh repair of a hernia to the abdomen.  She is non toxic appearing at this time.  However her white count is in the 13 range.  She has already had CT imaging which I have personally reviewed and interpreted and radiology has interpreted that has isolated this abscess.  We have consult to surgery for this and we have ordered antibiotics for the patient.  She will be  admitted for further care.  She is in stable condition at this time.  ED diagnosis:  1.  Acute intra-abdominal abscess involving previous mesh repair of hernia.    2. Acute leukocytosis.                 ED Course as of Dec 16 1607   Wed Dec 16, 2020   1233 WBC(!): 13.44 [CC]   1233 SARS-CoV-2 RNA, Amplification, Qual: Negative [CC]   1255 Lactate, Jaxson: 1.6 [CC]      ED Course User Index  [CC] Monserrat Avila PA-C            Clinical Impression:       ICD-10-CM ICD-9-CM   1. Abdominal wall abscess at site of surgical wound  T81.49XA 682.2                      Disposition:   Disposition: Admitted  Condition: Fair     ED Disposition Condition    Admit                             Monserrat Avila PA-C  12/16/20 1607       Perla Martin MD  12/17/20 [id number]

## 2020-12-16 NOTE — H&P
General Surgery  History and Physical     SUBJECTIVE:      History of Present Illness:  Barbara Ortiz is a 55 y.o. female well known to Dr. Baker's service who presents for follow up of a recurrent mesh infection. She has a history of colostomy and underwent reversal with incisional hernia repair 4/2018. Then underwent hernia repair with mesh 9/2018 c/b abscess formation s/p I&D 10/2018. Has since had abscess recurrence and has had to undergo IR drainage as well. She has been lost to follow up for about a year until she returned today.      Reports that over the last couple weeks she noticed her stomach started to enlarge and she was having increased pain, tenderness, and warmth at that site. She also reports having fevers at home of 102. She underwent CT abd/pelvis on 12/4/2020 which revealed a large abscess at the same site as prior along with a known fat containing hernia. She has arrived from [place] with her landlord for transportation.      No chief complaint on file.             Review of patient's allergies indicates:   Allergen Reactions    Dilaudid [hydromorphone] Itching    Fentanyl Itching    Versed [midazolam] Itching         Current Medications          Current Outpatient Medications   Medication Sig Dispense Refill    atenolol-chlorthalidone (TENORETIC) 50-25 mg Tab Take 1 tablet by mouth once daily.        escitalopram oxalate (LEXAPRO) 20 MG tablet Take 20 mg by mouth.        estradiol (ESTRACE) 1 MG tablet Take 1 mg by mouth once daily.         fluconazole (DIFLUCAN) 150 MG Tab          gabapentin (NEURONTIN) 300 MG capsule Take 1 capsule (300 mg total) by mouth 3 (three) times daily. 90 capsule 11    hydrOXYzine pamoate (VISTARIL) 25 MG Cap Take 25 mg by mouth every 8 (eight) hours as needed.         losartan (COZAAR) 100 MG tablet Take 0.5 tablets (50 mg total) by mouth every evening. (Patient taking differently: Take 100 mg by mouth once daily. ) 15 tablet 0    metFORMIN  (GLUCOPHAGE) 500 MG tablet Take 500 mg by mouth 2 (two) times daily with meals.        omeprazole (PRILOSEC) 40 MG capsule Take 40 mg by mouth.        ondansetron (ZOFRAN) 8 MG tablet Take 8 mg by mouth every 8 (eight) hours as needed for Nausea.        rosuvastatin (CRESTOR) 10 MG tablet Take 10 mg by mouth once daily.        traMADol (ULTRAM) 50 mg tablet Take 1 tablet (50 mg total) by mouth every 6 (six) hours as needed for Pain. 21 tablet 0    traZODone (DESYREL) 50 MG tablet Take 50 mg by mouth nightly as needed.           No current facility-administered medications for this visit.                 Past Medical History:   Diagnosis Date    Anxiety      Baker's cyst of knee, left      Diabetes mellitus      Diverticulitis      Fibromyalgia      Hypertension      Sleep apnea              Past Surgical History:   Procedure Laterality Date    CARPAL TUNNEL RELEASE Right      CHOLECYSTECTOMY        COLOSTOMY        COLOSTOMY CLOSURE   04/10/2018    HERNIA REPAIR   04/10/2018    LUMBAR FUSION        PARTIAL HYSTERECTOMY   2002    REPLACEMENT OF NEPHROSTOMY TUBE N/A 10/14/2019     Procedure: REPLACEMENT, NEPHROSTOMY TUBE;  [facility];  Location: [facility] OR [address] [place] [address];  Service: Anesthesiology;  Laterality: N/A;  189      No family history on file.  Social History            Tobacco Use    Smoking status: Never Smoker    Smokeless tobacco: Never Used   Substance Use Topics    Alcohol use: Yes       Comment: occasionally    Drug use: No         Review of Systems:  Review of Systems   Constitutional: Positive for fever. Negative for chills.   HENT: Negative for congestion and rhinorrhea.    Eyes: Negative for photophobia and visual disturbance.   Respiratory: Negative for cough and shortness of breath.    Cardiovascular: Negative for chest pain and palpitations.   Gastrointestinal: Positive for abdominal pain. Negative for constipation, diarrhea, nausea and vomiting.   Endocrine:  Negative for cold intolerance and heat intolerance.   Musculoskeletal: Negative for arthralgias and myalgias.   Skin: Negative for rash and wound.   Allergic/Immunologic: Negative for immunocompromised state.   Neurological: Negative for tremors and weakness.   Hematological: Negative for adenopathy.   Psychiatric/Behavioral: Negative for agitation.         OBJECTIVE:      Vital Signs (Most Recent)  Temp: 98.3 °F (36.8 °C) (12/16/20 1046)  Pulse: 69 (12/16/20 1046)  BP: (!) 146/76 (12/16/20 1046)  SpO2: (!) 94 % (12/16/20 1046)         Physical Exam:  Physical Exam  Constitutional:       General: She is not in acute distress.     Appearance: She is well-developed.   HENT:      Head: Normocephalic and atraumatic.   Eyes:      General: No scleral icterus.  Neck:      Musculoskeletal: Normal range of motion and neck supple.   Cardiovascular:      Rate and Rhythm: Normal rate and regular rhythm.   Pulmonary:      Effort: Pulmonary effort is normal. No respiratory distress.   Abdominal:      Comments: Soft, obese  Well healed surgical scars. Midline with an area of induration which is tender and fluctuant consistent with abscess   Musculoskeletal: Normal range of motion.         General: No tenderness.   Skin:     General: Skin is warm and dry.   Neurological:      Mental Status: She is alert and oriented to person, place, and time.       Diagnostics  1. Postoperative changes of prior ventral hernia repair with mesh again demonstrated.  Persistent large complex collection within the midline anterior abdominal wall musculature concerning for a residual abscess, mildly increased in size since the prior study.  Interval removal of previously demonstrated pigtail catheter within a more superficial subcutaneous collection.  2. Postoperative changes of posterior instrumented fusion at L4-5 with minimal perihardware lucency about the bilateral L5 screws, which may reflect some loosening or infection.  3. Hepatomegaly and  hepatic steatosis.  4. Postoperative changes of prior cholecystectomy, hysterectomy, appendectomy and partial colectomy.  5. Stable nonspecific 3.5 cm cystic left adnexal structure, possibly an ovarian cyst, not significant changed since 02/19/2019.  Further characterization with pelvic ultrasound could be performed.  6. Fat-containing left paramedian ventral hernia.  This report was flagged in Epic as abnormal.     ASSESSMENT/PLAN:   55 yo female with a history of hernia repair with mesh now with recurrent abdominal wall abscesses.      - Given the abscess found on CT on 12/4 and her symptoms, admission to the hospital for IR drainage of the abscess and IV antibiotics  - She is amenable to this  - Once inpatient, okay for regular diet, NPO at midnight. Broad spectrum IV antibiotics (has grown MSSA in the past). IR for drainage.

## 2020-12-16 NOTE — ED NOTES
Report received from ANUJ Reilly and care assumed at this time. Patient provided personal care items per RN. NAD noted and patient A&OX4. Patient declines any other needs at this time. Will continue to monitor.

## 2020-12-17 LAB
ALBUMIN SERPL BCP-MCNC: 3.1 G/DL (ref 3.5–5.2)
ALP SERPL-CCNC: 112 U/L (ref 55–135)
ALT SERPL W/O P-5'-P-CCNC: 13 U/L (ref 10–44)
ANION GAP SERPL CALC-SCNC: 11 MMOL/L (ref 8–16)
APPEARANCE FLD: NORMAL
AST SERPL-CCNC: 15 U/L (ref 10–40)
BASOPHILS # BLD AUTO: 0.05 K/UL (ref 0–0.2)
BASOPHILS NFR BLD: 0.5 % (ref 0–1.9)
BILIRUB SERPL-MCNC: 0.8 MG/DL (ref 0.1–1)
BODY FLD TYPE: NORMAL
BODY FLUID COMMENTS: NORMAL
BUN SERPL-MCNC: 12 MG/DL (ref 6–20)
CALCIUM SERPL-MCNC: 9 MG/DL (ref 8.7–10.5)
CHLORIDE SERPL-SCNC: 98 MMOL/L (ref 95–110)
CO2 SERPL-SCNC: 27 MMOL/L (ref 23–29)
COLOR FLD: NORMAL
CREAT SERPL-MCNC: 1.1 MG/DL (ref 0.5–1.4)
DIFFERENTIAL METHOD: ABNORMAL
EOSINOPHIL # BLD AUTO: 0.2 K/UL (ref 0–0.5)
EOSINOPHIL NFR BLD: 1.8 % (ref 0–8)
ERYTHROCYTE [DISTWIDTH] IN BLOOD BY AUTOMATED COUNT: 12.2 % (ref 11.5–14.5)
EST. GFR  (AFRICAN AMERICAN): >60 ML/MIN/1.73 M^2
EST. GFR  (NON AFRICAN AMERICAN): 57.5 ML/MIN/1.73 M^2
GLUCOSE SERPL-MCNC: 135 MG/DL (ref 70–110)
GRAM STN SPEC: NORMAL
GRAM STN SPEC: NORMAL
HCT VFR BLD AUTO: 38.1 % (ref 37–48.5)
HGB BLD-MCNC: 12.6 G/DL (ref 12–16)
IMM GRANULOCYTES # BLD AUTO: 0.03 K/UL (ref 0–0.04)
IMM GRANULOCYTES NFR BLD AUTO: 0.3 % (ref 0–0.5)
INR PPP: 1 (ref 0.8–1.2)
LYMPHOCYTES # BLD AUTO: 1.5 K/UL (ref 1–4.8)
LYMPHOCYTES NFR BLD: 14.1 % (ref 18–48)
MAGNESIUM SERPL-MCNC: 1.7 MG/DL (ref 1.6–2.6)
MCH RBC QN AUTO: 29.8 PG (ref 27–31)
MCHC RBC AUTO-ENTMCNC: 33.1 G/DL (ref 32–36)
MCV RBC AUTO: 90 FL (ref 82–98)
MONOCYTES # BLD AUTO: 0.7 K/UL (ref 0.3–1)
MONOCYTES NFR BLD: 6.4 % (ref 4–15)
NEUTROPHILS # BLD AUTO: 8 K/UL (ref 1.8–7.7)
NEUTROPHILS NFR BLD: 76.9 % (ref 38–73)
NRBC BLD-RTO: 0 /100 WBC
PHOSPHATE SERPL-MCNC: 4 MG/DL (ref 2.7–4.5)
PLATELET # BLD AUTO: 331 K/UL (ref 150–350)
PMV BLD AUTO: 9.2 FL (ref 9.2–12.9)
POTASSIUM SERPL-SCNC: 4.1 MMOL/L (ref 3.5–5.1)
PROT SERPL-MCNC: 7.3 G/DL (ref 6–8.4)
PROTHROMBIN TIME: 11.4 SEC (ref 9–12.5)
RBC # BLD AUTO: 4.23 M/UL (ref 4–5.4)
SODIUM SERPL-SCNC: 136 MMOL/L (ref 136–145)
WBC # BLD AUTO: 10.39 K/UL (ref 3.9–12.7)
WBC # FLD: NORMAL /CU MM

## 2020-12-17 PROCEDURE — 99232 SBSQ HOSP IP/OBS MODERATE 35: CPT | Mod: ,,, | Performed by: SURGERY

## 2020-12-17 PROCEDURE — 87075 CULTR BACTERIA EXCEPT BLOOD: CPT

## 2020-12-17 PROCEDURE — 80053 COMPREHEN METABOLIC PANEL: CPT

## 2020-12-17 PROCEDURE — 89051 BODY FLUID CELL COUNT: CPT

## 2020-12-17 PROCEDURE — 87070 CULTURE OTHR SPECIMN AEROBIC: CPT

## 2020-12-17 PROCEDURE — 85025 COMPLETE CBC W/AUTO DIFF WBC: CPT

## 2020-12-17 PROCEDURE — 99232 PR SUBSEQUENT HOSPITAL CARE,LEVL II: ICD-10-PCS | Mod: ,,, | Performed by: SURGERY

## 2020-12-17 PROCEDURE — 36415 COLL VENOUS BLD VENIPUNCTURE: CPT

## 2020-12-17 PROCEDURE — 85610 PROTHROMBIN TIME: CPT

## 2020-12-17 PROCEDURE — 25000003 PHARM REV CODE 250: Performed by: STUDENT IN AN ORGANIZED HEALTH CARE EDUCATION/TRAINING PROGRAM

## 2020-12-17 PROCEDURE — 83735 ASSAY OF MAGNESIUM: CPT

## 2020-12-17 PROCEDURE — 87205 SMEAR GRAM STAIN: CPT

## 2020-12-17 PROCEDURE — 20600001 HC STEP DOWN PRIVATE ROOM

## 2020-12-17 PROCEDURE — 87077 CULTURE AEROBIC IDENTIFY: CPT

## 2020-12-17 PROCEDURE — 63600175 PHARM REV CODE 636 W HCPCS: Performed by: RADIOLOGY

## 2020-12-17 PROCEDURE — 87102 FUNGUS ISOLATION CULTURE: CPT

## 2020-12-17 PROCEDURE — 84100 ASSAY OF PHOSPHORUS: CPT

## 2020-12-17 PROCEDURE — 63600175 PHARM REV CODE 636 W HCPCS: Performed by: STUDENT IN AN ORGANIZED HEALTH CARE EDUCATION/TRAINING PROGRAM

## 2020-12-17 PROCEDURE — 87186 SC STD MICRODIL/AGAR DIL: CPT

## 2020-12-17 RX ORDER — DIPHENHYDRAMINE HYDROCHLORIDE 50 MG/ML
INJECTION INTRAMUSCULAR; INTRAVENOUS CODE/TRAUMA/SEDATION MEDICATION
Status: COMPLETED | OUTPATIENT
Start: 2020-12-17 | End: 2020-12-17

## 2020-12-17 RX ADMIN — OXYCODONE HYDROCHLORIDE 5 MG: 5 TABLET ORAL at 09:12

## 2020-12-17 RX ADMIN — DIPHENHYDRAMINE HYDROCHLORIDE 50 MG: 50 INJECTION INTRAMUSCULAR; INTRAVENOUS at 11:12

## 2020-12-17 RX ADMIN — ESCITALOPRAM 20 MG: 5 TABLET, FILM COATED ORAL at 09:12

## 2020-12-17 RX ADMIN — ACETAMINOPHEN 650 MG: 325 TABLET ORAL at 12:12

## 2020-12-17 RX ADMIN — PIPERACILLIN AND TAZOBACTAM 4.5 G: 4; .5 INJECTION, POWDER, LYOPHILIZED, FOR SOLUTION INTRAVENOUS; PARENTERAL at 08:12

## 2020-12-17 RX ADMIN — DIPHENHYDRAMINE HYDROCHLORIDE 25 MG: 25 CAPSULE ORAL at 08:12

## 2020-12-17 RX ADMIN — PANTOPRAZOLE SODIUM 40 MG: 40 TABLET, DELAYED RELEASE ORAL at 09:12

## 2020-12-17 RX ADMIN — ENOXAPARIN SODIUM 40 MG: 40 INJECTION SUBCUTANEOUS at 05:12

## 2020-12-17 RX ADMIN — PIPERACILLIN AND TAZOBACTAM 4.5 G: 4; .5 INJECTION, POWDER, LYOPHILIZED, FOR SOLUTION INTRAVENOUS; PARENTERAL at 04:12

## 2020-12-17 RX ADMIN — ROSUVASTATIN CALCIUM 10 MG: 10 TABLET, FILM COATED ORAL at 09:12

## 2020-12-17 RX ADMIN — PIPERACILLIN AND TAZOBACTAM 4.5 G: 4; .5 INJECTION, POWDER, LYOPHILIZED, FOR SOLUTION INTRAVENOUS; PARENTERAL at 12:12

## 2020-12-17 NOTE — SUBJECTIVE & OBJECTIVE
Interval History: NAEON. Afebrile. VSS. Pain is controlled. Ambulating without concern. Adequate UOP. No new symptoms or concerns this morning.       Medications:  Continuous Infusions:  Scheduled Meds:   enoxaparin  40 mg Subcutaneous Q24H    escitalopram oxalate  20 mg Oral Daily    lidocaine (PF) 10 mg/ml (1%)  1 mL Other Once    pantoprazole  40 mg Oral Daily    piperacillin-tazobactam (ZOSYN) IVPB  4.5 g Intravenous Q8H    rosuvastatin  10 mg Oral Daily     PRN Meds:acetaminophen, acetaminophen, diphenhydrAMINE, ondansetron, oxyCODONE, sodium chloride 0.9%     Review of patient's allergies indicates:   Allergen Reactions    Losartan Anaphylaxis     Lips and tongue swelling    Dilaudid [hydromorphone] Itching    Fentanyl Itching    Versed [midazolam] Itching     Objective:     Vital Signs (Most Recent):  Temp: 98 °F (36.7 °C) (12/17/20 0814)  Pulse: (!) 55 (12/17/20 0814)  Resp: 17 (12/17/20 0947)  BP: 126/74 (12/17/20 0814)  SpO2: 97 % (12/17/20 0814) Vital Signs (24h Range):  Temp:  [97.7 °F (36.5 °C)-98.8 °F (37.1 °C)] 98 °F (36.7 °C)  Pulse:  [55-68] 55  Resp:  [16-20] 17  SpO2:  [97 %-100 %] 97 %  BP: ()/(56-83) 126/74     Weight: 99 kg (218 lb 4.1 oz)  Body mass index is 35.23 kg/m².    Intake/Output - Last 3 Shifts       12/15 0700 - 12/16 0659 12/16 0700 - 12/17 0659 12/17 0700 - 12/18 0659    P.O.   50    IV Piggyback  1100     Total Intake(mL/kg)  1100 (11.1) 50 (0.5)    Net  +1100 +50           Urine Occurrence   2 x    Stool Occurrence  0 x           Physical Exam  Vitals signs and nursing note reviewed.   Constitutional:       General: She is not in acute distress.     Appearance: She is obese. She is not diaphoretic.   HENT:      Head: Normocephalic and atraumatic.      Mouth/Throat:      Mouth: Mucous membranes are moist.      Pharynx: Oropharynx is clear.   Eyes:      Extraocular Movements: Extraocular movements intact.      Conjunctiva/sclera: Conjunctivae normal.   Neck:       Musculoskeletal: Normal range of motion.   Cardiovascular:      Rate and Rhythm: Normal rate and regular rhythm.   Pulmonary:      Effort: Pulmonary effort is normal. No respiratory distress.   Abdominal:      General: There is no distension.      Palpations: Abdomen is soft.      Tenderness: There is abdominal tenderness. There is no guarding.      Comments: Well healed surgical scar to midline abdomen. Area of fluctuance and induration noted to center of midline incision. TTP. No erythema or drainage noted.    Musculoskeletal:         General: No deformity.   Skin:     General: Skin is warm and dry.   Neurological:      Mental Status: She is alert and oriented to person, place, and time.         Significant Labs:  CBC:   Recent Labs   Lab 12/17/20  0729   WBC 10.39   RBC 4.23   HGB 12.6   HCT 38.1      MCV 90   MCH 29.8   MCHC 33.1     BMP:   Recent Labs   Lab 12/17/20  0729   *      K 4.1   CL 98   CO2 27   BUN 12   CREATININE 1.1   CALCIUM 9.0   MG 1.7     CMP:   Recent Labs   Lab 12/17/20  0729   *   CALCIUM 9.0   ALBUMIN 3.1*   PROT 7.3      K 4.1   CO2 27   CL 98   BUN 12   CREATININE 1.1   ALKPHOS 112   ALT 13   AST 15   BILITOT 0.8     LFTs:   Recent Labs   Lab 12/17/20  0729   ALT 13   AST 15   ALKPHOS 112   BILITOT 0.8   PROT 7.3   ALBUMIN 3.1*       Significant Diagnostics:  I have reviewed all pertinent imaging results/findings within the past 24 hours.

## 2020-12-17 NOTE — NURSING
Pt arrived to unit via wheel chair. VSS. No signs of distress. Pt had clothing in one bag and shoes in another. pts also has small brown purse. WCTM.

## 2020-12-17 NOTE — PLAN OF CARE
12/17/20 1041   Discharge Assessment   Assessment Type Discharge Planning Assessment   Confirmed/corrected address and phone number on facesheet? Yes   Assessment information obtained from? Patient   Expected Length of Stay (days) 3   Communicated expected length of stay with patient/caregiver yes   Prior to hospitilization cognitive status: Alert/Oriented   Prior to hospitalization functional status: Independent   Current cognitive status: Alert/Oriented   Current Functional Status: Independent   Lives With alone   Able to Return to Prior Arrangements yes   Is patient able to care for self after discharge? Unable to determine at this time (comments)   Patient's perception of discharge disposition home or selfcare   Readmission Within the Last 30 Days no previous admission in last 30 days   Patient currently being followed by outpatient case management? No   Patient currently receives any other outside agency services? No   Equipment Currently Used at Home none   Do you have any problems affording any of your prescribed medications? No   Is the patient taking medications as prescribed? yes   Does the patient have transportation home? Yes   Transportation Anticipated family or friend will provide;car, drives self   Does the patient receive services at the [facility]? No   Discharge Plan A Home   Discharge Plan B Home with family   DME Needed Upon Discharge  other (see comments)  (TBD)     CM conducted discharge planning assessment at the bedside. Patient lives alone, but has a sister and friend who can assist her if needed. Patient states she drove herself to the hospital and did not realize she would be admitted. Car is in the parking garage. Lives in [place]. CM will continue to follow up and assist with needs as indicated.    Noah Cordero MD      [address] - [address] [place] [address]  [address] [facility] [address]  Phone: [phone] Fax:  [phone]    Payor: MEDICARE / Plan: MEDICARE PART A & B / Product Type: Government /     Regla Alberto RN Case Manager  [facility]

## 2020-12-17 NOTE — PLAN OF CARE
Procedure complete. Pt tolerated well. Site clean, dry, intact, no bleeding, no hematoma. Report called to RN. Pt to return to room in stretcher via transport.

## 2020-12-17 NOTE — ED NOTES
Pt aaox4, c/o abd pain, pt repositioned in bed with relief to abdomen. Pt given call bell and updated on POC. Pt instructed to alert nurse for any needs or trying to get out of bed, pt verbalizes understanding.

## 2020-12-17 NOTE — PLAN OF CARE
Pt. Aaox4.vss. IV antibiotics administered. NPO. Safety measures maintained. Poc reviewed with pt. SHIELA.

## 2020-12-17 NOTE — PROGRESS NOTES
[facility]  General Surgery  Progress Note    Subjective:       Post-Op Info:  * No surgery found *         Interval History: NAEON. Afebrile. VSS. Pain is controlled. Ambulating without concern. Adequate UOP. No new symptoms or concerns this morning.       Medications:  Continuous Infusions:  Scheduled Meds:   enoxaparin  40 mg Subcutaneous Q24H    escitalopram oxalate  20 mg Oral Daily    lidocaine (PF) 10 mg/ml (1%)  1 mL Other Once    pantoprazole  40 mg Oral Daily    piperacillin-tazobactam (ZOSYN) IVPB  4.5 g Intravenous Q8H    rosuvastatin  10 mg Oral Daily     PRN Meds:acetaminophen, acetaminophen, diphenhydrAMINE, ondansetron, oxyCODONE, sodium chloride 0.9%     Review of patient's allergies indicates:   Allergen Reactions    Losartan Anaphylaxis     Lips and tongue swelling    Dilaudid [hydromorphone] Itching    Fentanyl Itching    Versed [midazolam] Itching     Objective:     Vital Signs (Most Recent):  Temp: 98 °F (36.7 °C) (12/17/20 0814)  Pulse: (!) 55 (12/17/20 0814)  Resp: 17 (12/17/20 0947)  BP: 126/74 (12/17/20 0814)  SpO2: 97 % (12/17/20 0814) Vital Signs (24h Range):  Temp:  [97.7 °F (36.5 °C)-98.8 °F (37.1 °C)] 98 °F (36.7 °C)  Pulse:  [55-68] 55  Resp:  [16-20] 17  SpO2:  [97 %-100 %] 97 %  BP: ()/(56-83) 126/74     Weight: 99 kg (218 lb 4.1 oz)  Body mass index is 35.23 kg/m².    Intake/Output - Last 3 Shifts       12/15 0700 - 12/16 0659 12/16 0700 - 12/17 0659 12/17 0700 - 12/18 0659    P.O.   50    IV Piggyback  1100     Total Intake(mL/kg)  1100 (11.1) 50 (0.5)    Net  +1100 +50           Urine Occurrence   2 x    Stool Occurrence  0 x           Physical Exam  Vitals signs and nursing note reviewed.   Constitutional:       General: She is not in acute distress.     Appearance: She is obese. She is not diaphoretic.   HENT:      Head: Normocephalic and atraumatic.      Mouth/Throat:      Mouth: Mucous membranes are moist.      Pharynx: Oropharynx is clear.    Eyes:      Extraocular Movements: Extraocular movements intact.      Conjunctiva/sclera: Conjunctivae normal.   Neck:      Musculoskeletal: Normal range of motion.   Cardiovascular:      Rate and Rhythm: Normal rate and regular rhythm.   Pulmonary:      Effort: Pulmonary effort is normal. No respiratory distress.   Abdominal:      General: There is no distension.      Palpations: Abdomen is soft.      Tenderness: There is abdominal tenderness. There is no guarding.      Comments: Well healed surgical scar to midline abdomen. Area of fluctuance and induration noted to center of midline incision. TTP. No erythema or drainage noted.    Musculoskeletal:         General: No deformity.   Skin:     General: Skin is warm and dry.   Neurological:      Mental Status: She is alert and oriented to person, place, and time.         Significant Labs:  CBC:   Recent Labs   Lab 12/17/20  0729   WBC 10.39   RBC 4.23   HGB 12.6   HCT 38.1      MCV 90   MCH 29.8   MCHC 33.1     BMP:   Recent Labs   Lab 12/17/20  0729   *      K 4.1   CL 98   CO2 27   BUN 12   CREATININE 1.1   CALCIUM 9.0   MG 1.7     CMP:   Recent Labs   Lab 12/17/20  0729   *   CALCIUM 9.0   ALBUMIN 3.1*   PROT 7.3      K 4.1   CO2 27   CL 98   BUN 12   CREATININE 1.1   ALKPHOS 112   ALT 13   AST 15   BILITOT 0.8     LFTs:   Recent Labs   Lab 12/17/20  0729   ALT 13   AST 15   ALKPHOS 112   BILITOT 0.8   PROT 7.3   ALBUMIN 3.1*       Significant Diagnostics:  I have reviewed all pertinent imaging results/findings within the past 24 hours.    Assessment/Plan:     * Abdominal wall abscess at site of surgical wound  53 yo female with a history of hernia repair with mesh now with recurrent abdominal wall abscesses.      - NPO   - Continue IV antibiotics, currently day 2 zosyn  - To get IR drain placement today  - WBC 13 --> 10  - PRN pain and nausea medications  - DVT prophylaxis  - OOB, ambulate          Discussed case with   Samuel.     Derick Estrella PA-C  General Surgery  [facility] [id number]

## 2020-12-17 NOTE — ASSESSMENT & PLAN NOTE
53 yo female with a history of hernia repair with mesh now with recurrent abdominal wall abscesses.      - NPO   - Continue IV antibiotics, currently day 2 zosyn  - To get IR drain placement today  - WBC 13 --> 10  - PRN pain and nausea medications  - DVT prophylaxis  - OOB, ambulate

## 2020-12-17 NOTE — H&P
Inpatient Radiology Pre-procedure Note    History of Present Illness:  Barbara Ortiz is a 55 y.o. female with a history of colostomy and underwent reversal with incisional hernia repair 4/2018.  She had hernia repair with mesh on 09/2018 c/b recurrent abscess. The most recent CT showed large complex collection within the midline anterior abdominal wall concerning for abscess. IR is consulted for drainage cath placement.     Admission H&P reviewed.  Past Medical History:   Diagnosis Date    Anxiety     Baker's cyst of knee, left     Diabetes mellitus     Diverticulitis     Fibromyalgia     Hypertension     Sleep apnea      Past Surgical History:   Procedure Laterality Date    BRAIN SURGERY      CARPAL TUNNEL RELEASE Right     CHOLECYSTECTOMY      COLOSTOMY      COLOSTOMY CLOSURE  04/10/2018    HERNIA REPAIR  04/10/2018    LUMBAR FUSION      PARTIAL HYSTERECTOMY  2002    REPLACEMENT OF NEPHROSTOMY TUBE N/A 10/14/2019    Procedure: REPLACEMENT, NEPHROSTOMY TUBE;  Surgeon: Stefan Diagnostic Provider;  Location: [facility] OR [address] [place] [address];  Service: Anesthesiology;  Laterality: N/A;  189       Review of Systems:   As documented in primary team H&P    Home Meds:   Prior to Admission medications    Medication Sig Start Date End Date Taking? Authorizing Provider   atenoloL-chlorthalidone (TENORETIC) 100-25 mg per tablet Take 1 tablet by mouth once daily.   Yes Historical Provider   DULoxetine (CYMBALTA) 60 MG capsule Take 60 mg by mouth once daily.   Yes Historical Provider   hydrOXYzine pamoate (VISTARIL) 25 MG Cap Take 25 mg by mouth every 8 (eight) hours as needed.    Yes Historical Provider   metFORMIN (GLUCOPHAGE) 500 MG tablet Take 500 mg by mouth 2 (two) times daily with meals.   Yes Historical Provider   traZODone (DESYREL) 50 MG tablet Take 50 mg by mouth nightly as needed.    Yes Historical Provider   escitalopram oxalate (LEXAPRO) 20 MG tablet Take 20 mg by mouth.    Historical Provider   gabapentin (NEURONTIN)  300 MG capsule Take 1 capsule (300 mg total) by mouth 3 (three) times daily. 4/26/18 4/26/19  Martha Cho PA-C   omeprazole (PRILOSEC) 40 MG capsule Take 40 mg by mouth.    Historical Provider   ondansetron (ZOFRAN) 8 MG tablet Take 8 mg by mouth every 8 (eight) hours as needed for Nausea.    Historical Provider     Scheduled Meds:    enoxaparin  40 mg Subcutaneous Q24H    escitalopram oxalate  20 mg Oral Daily    lidocaine (PF) 10 mg/ml (1%)  1 mL Other Once    pantoprazole  40 mg Oral Daily    piperacillin-tazobactam (ZOSYN) IVPB  4.5 g Intravenous Q8H    rosuvastatin  10 mg Oral Daily     Continuous Infusions:   PRN Meds:acetaminophen, acetaminophen, diphenhydrAMINE, ondansetron, oxyCODONE, sodium chloride 0.9%    Allergies:   Review of patient's allergies indicates:   Allergen Reactions    Losartan Anaphylaxis     Lips and tongue swelling    Dilaudid [hydromorphone] Itching    Fentanyl Itching    Versed [midazolam] Itching     Sedation Hx: pt is allergic to Fentanyl and Versed (itching).    Labs:  Recent Labs   Lab 12/17/20  0730   INR 1.0       Recent Labs   Lab 12/17/20 0729   WBC 10.39   HGB 12.6   HCT 38.1   MCV 90         Recent Labs   Lab 12/17/20  0729   *      K 4.1   CL 98   CO2 27   BUN 12   CREATININE 1.1   CALCIUM 9.0   MG 1.7   ALT 13   AST 15   ALBUMIN 3.1*   BILITOT 0.8         Vitals:  Temp: 98 °F (36.7 °C) (12/17/20 0814)  Pulse: (!) 55 (12/17/20 0814)  Resp: 17 (12/17/20 0947)  BP: 126/74 (12/17/20 0814)  SpO2: 97 % (12/17/20 0814)     Physical Exam:  ASA: 3  Mallampati: 2    General: no acute distress  Mental Status: alert and oriented to person, place and time  HEENT: normocephalic, atraumatic  Chest: unlabored breathing  Heart: regular heart rate  Abdomen: diffuse abdominal pain  Extremity: moves all extremities    Anticoagulants/Antiplatelets: no anticoagulation    Plan: imaging guided drainage catheter placement in a complex fluid collection along the  anterior abdominal wall.    Sedation Plan: LOCAL as pt is allergic to Fentanyl and Versed (itching).    Diana Santamaria MD  Radiology Department/ PGY-3  [facility]

## 2020-12-18 VITALS
WEIGHT: 218.25 LBS | HEIGHT: 66 IN | OXYGEN SATURATION: 100 % | BODY MASS INDEX: 35.08 KG/M2 | SYSTOLIC BLOOD PRESSURE: 166 MMHG | TEMPERATURE: 99 F | DIASTOLIC BLOOD PRESSURE: 87 MMHG | HEART RATE: 60 BPM | RESPIRATION RATE: 20 BRPM

## 2020-12-18 PROBLEM — T81.49XA ABDOMINAL WALL ABSCESS AT SITE OF SURGICAL WOUND: Status: RESOLVED | Noted: 2020-12-16 | Resolved: 2020-12-18

## 2020-12-18 LAB
ALBUMIN SERPL BCP-MCNC: 3.2 G/DL (ref 3.5–5.2)
ALP SERPL-CCNC: 121 U/L (ref 55–135)
ALT SERPL W/O P-5'-P-CCNC: 12 U/L (ref 10–44)
ANION GAP SERPL CALC-SCNC: 11 MMOL/L (ref 8–16)
AST SERPL-CCNC: 16 U/L (ref 10–40)
BASOPHILS # BLD AUTO: 0.05 K/UL (ref 0–0.2)
BASOPHILS NFR BLD: 0.6 % (ref 0–1.9)
BILIRUB SERPL-MCNC: 0.6 MG/DL (ref 0.1–1)
BUN SERPL-MCNC: 10 MG/DL (ref 6–20)
CALCIUM SERPL-MCNC: 9 MG/DL (ref 8.7–10.5)
CHLORIDE SERPL-SCNC: 99 MMOL/L (ref 95–110)
CO2 SERPL-SCNC: 29 MMOL/L (ref 23–29)
CREAT SERPL-MCNC: 1 MG/DL (ref 0.5–1.4)
DIFFERENTIAL METHOD: NORMAL
EOSINOPHIL # BLD AUTO: 0.2 K/UL (ref 0–0.5)
EOSINOPHIL NFR BLD: 2.9 % (ref 0–8)
ERYTHROCYTE [DISTWIDTH] IN BLOOD BY AUTOMATED COUNT: 12 % (ref 11.5–14.5)
EST. GFR  (AFRICAN AMERICAN): >60 ML/MIN/1.73 M^2
EST. GFR  (NON AFRICAN AMERICAN): >60 ML/MIN/1.73 M^2
GLUCOSE SERPL-MCNC: 110 MG/DL (ref 70–110)
HCT VFR BLD AUTO: 39.6 % (ref 37–48.5)
HGB BLD-MCNC: 12.8 G/DL (ref 12–16)
IMM GRANULOCYTES # BLD AUTO: 0.02 K/UL (ref 0–0.04)
IMM GRANULOCYTES NFR BLD AUTO: 0.2 % (ref 0–0.5)
INR PPP: 1 (ref 0.8–1.2)
LYMPHOCYTES # BLD AUTO: 2.1 K/UL (ref 1–4.8)
LYMPHOCYTES NFR BLD: 25.8 % (ref 18–48)
MAGNESIUM SERPL-MCNC: 1.9 MG/DL (ref 1.6–2.6)
MCH RBC QN AUTO: 29.4 PG (ref 27–31)
MCHC RBC AUTO-ENTMCNC: 32.3 G/DL (ref 32–36)
MCV RBC AUTO: 91 FL (ref 82–98)
MONOCYTES # BLD AUTO: 0.7 K/UL (ref 0.3–1)
MONOCYTES NFR BLD: 7.9 % (ref 4–15)
NEUTROPHILS # BLD AUTO: 5.1 K/UL (ref 1.8–7.7)
NEUTROPHILS NFR BLD: 62.6 % (ref 38–73)
NRBC BLD-RTO: 0 /100 WBC
PHOSPHATE SERPL-MCNC: 4 MG/DL (ref 2.7–4.5)
PLATELET # BLD AUTO: 329 K/UL (ref 150–350)
PMV BLD AUTO: 9.3 FL (ref 9.2–12.9)
POTASSIUM SERPL-SCNC: 3.9 MMOL/L (ref 3.5–5.1)
PROT SERPL-MCNC: 7.6 G/DL (ref 6–8.4)
PROTHROMBIN TIME: 11 SEC (ref 9–12.5)
RBC # BLD AUTO: 4.36 M/UL (ref 4–5.4)
SODIUM SERPL-SCNC: 139 MMOL/L (ref 136–145)
WBC # BLD AUTO: 8.18 K/UL (ref 3.9–12.7)

## 2020-12-18 PROCEDURE — 25000003 PHARM REV CODE 250: Performed by: STUDENT IN AN ORGANIZED HEALTH CARE EDUCATION/TRAINING PROGRAM

## 2020-12-18 PROCEDURE — 99238 HOSP IP/OBS DSCHRG MGMT 30/<: CPT | Mod: ,,, | Performed by: STUDENT IN AN ORGANIZED HEALTH CARE EDUCATION/TRAINING PROGRAM

## 2020-12-18 PROCEDURE — 84100 ASSAY OF PHOSPHORUS: CPT

## 2020-12-18 PROCEDURE — 80053 COMPREHEN METABOLIC PANEL: CPT

## 2020-12-18 PROCEDURE — 85025 COMPLETE CBC W/AUTO DIFF WBC: CPT

## 2020-12-18 PROCEDURE — 36415 COLL VENOUS BLD VENIPUNCTURE: CPT

## 2020-12-18 PROCEDURE — 63600175 PHARM REV CODE 636 W HCPCS: Performed by: STUDENT IN AN ORGANIZED HEALTH CARE EDUCATION/TRAINING PROGRAM

## 2020-12-18 PROCEDURE — 99238 PR HOSPITAL DISCHARGE DAY,<30 MIN: ICD-10-PCS | Mod: ,,, | Performed by: STUDENT IN AN ORGANIZED HEALTH CARE EDUCATION/TRAINING PROGRAM

## 2020-12-18 PROCEDURE — 83735 ASSAY OF MAGNESIUM: CPT

## 2020-12-18 PROCEDURE — 85610 PROTHROMBIN TIME: CPT

## 2020-12-18 RX ORDER — HYDROCODONE BITARTRATE AND ACETAMINOPHEN 5; 325 MG/1; MG/1
1 TABLET ORAL EVERY 6 HOURS PRN
Qty: 5 TABLET | Refills: 0 | Status: SHIPPED | OUTPATIENT
Start: 2020-12-18 | End: 2021-02-12 | Stop reason: SDUPTHER

## 2020-12-18 RX ORDER — AMOXICILLIN AND CLAVULANATE POTASSIUM 875; 125 MG/1; MG/1
1 TABLET, FILM COATED ORAL EVERY 12 HOURS
Qty: 6 TABLET | Refills: 0 | Status: SHIPPED | OUTPATIENT
Start: 2020-12-18 | End: 2020-12-21

## 2020-12-18 RX ORDER — HYDROCODONE BITARTRATE AND ACETAMINOPHEN 5; 325 MG/1; MG/1
1 TABLET ORAL EVERY 6 HOURS PRN
Qty: 5 TABLET | Refills: 0 | Status: SHIPPED | OUTPATIENT
Start: 2020-12-18 | End: 2020-12-18 | Stop reason: SDUPTHER

## 2020-12-18 RX ORDER — FLUCONAZOLE 150 MG/1
150 TABLET ORAL DAILY
Qty: 1 TABLET | Refills: 0 | Status: SHIPPED | OUTPATIENT
Start: 2020-12-18 | End: 2020-12-19

## 2020-12-18 RX ORDER — HYDROCODONE BITARTRATE AND ACETAMINOPHEN 5; 325 MG/1; MG/1
1 TABLET ORAL EVERY 6 HOURS PRN
Qty: 8 TABLET | Refills: 0 | Status: SHIPPED | OUTPATIENT
Start: 2020-12-18 | End: 2020-12-18 | Stop reason: SDUPTHER

## 2020-12-18 RX ADMIN — ESCITALOPRAM 20 MG: 5 TABLET, FILM COATED ORAL at 09:12

## 2020-12-18 RX ADMIN — ROSUVASTATIN CALCIUM 10 MG: 10 TABLET, FILM COATED ORAL at 09:12

## 2020-12-18 RX ADMIN — PANTOPRAZOLE SODIUM 40 MG: 40 TABLET, DELAYED RELEASE ORAL at 09:12

## 2020-12-18 RX ADMIN — PIPERACILLIN AND TAZOBACTAM 4.5 G: 4; .5 INJECTION, POWDER, LYOPHILIZED, FOR SOLUTION INTRAVENOUS; PARENTERAL at 05:12

## 2020-12-18 NOTE — DISCHARGE SUMMARY
[facility]  General Surgery  Discharge Summary      Patient Name: Barbara Ortiz  MRN: [id number]  Admission Date: 12/16/2020  Hospital Length of Stay: 2 days  Discharge Date and Time:  12/18/2020 7:30 AM  Attending Physician: Teo Baker MD   Discharging Provider: Derick Estrella PA-C  Primary Care Provider: Noah Cordero MD     HPI:   Barbara Ortiz is a 55 y.o. female well known to Dr. Baker's service who presents for follow up of a recurrent mesh infection. She has a history of colostomy and underwent reversal with incisional hernia repair 4/2018. Then underwent hernia repair with mesh 9/2018 c/b abscess formation s/p I&D 10/2018. Has since had abscess recurrence and has had to undergo IR drainage as well. She has been lost to follow up for about a year until she returned today.      Reports that over the last couple weeks she noticed her stomach started to enlarge and she was having increased pain, tenderness, and warmth at that site. She also reports having fevers at home of 102. She underwent CT abd/pelvis on 12/4/2020 which revealed a large abscess at the same site as prior along with a known fat containing hernia. [facility] [place] [facility]     * No surgery found *     Hospital Course:   Ms. Ortiz was admitted to the general surgery service on 12/16 after seeing Dr. Baker in clinic. CT a/p 12/4 showed a complex fluid collection concerning for abscess at the site of her previous hernia repair. When she came into clinic, she was experiencing fever and abdominal pain so she was admitted. She was HDS with negative lactate and negative blood cultures. She was started on IV zosyn. IR was consulted for drain placement. She underwent drain placement to her anterior abdominal wall on 12/17. The patient tolerated the procedure well. The patient's clinical condition progressively improved. She remained afebrile with a downtrending WBC. She  was able to tolerate PO pain medications with the addition of benadryl for itching.   By the time of discharge, she was tolerating a diet without nausea or vomiting, pain was well controlled with oral medications, and she was ambulating without difficulty. On POD 1 the patient was discharged to home. On discharge, the patient's drain site was c/d/i and was soft and appropriately tender to palpation. LEON drain output was stable and serosanguinous. She was transition to PO Augmentin to continue for 3 days upon discharge, for a 5 day total course. She was also given Diflucan as patient reports yeast infections when taking antibiotics in the past. The patient will follow up in Dr. Baker's clinic in 1 week.         Consults:   Consults (From admission, onward)        Status Ordering Provider     Inpatient consult to Interventional Radiology  Once     Provider:  (Not yet assigned)    Completed YAMILA CAMARGO          Physical Exam  Vitals signs and nursing note reviewed.   Constitutional:       General: She is not in acute distress.     Appearance: She is obese. She is not diaphoretic.   HENT:      Head: Normocephalic and atraumatic.      Mouth/Throat:      Mouth: Mucous membranes are moist.      Pharynx: Oropharynx is clear.   Eyes:      Extraocular Movements: Extraocular movements intact.      Conjunctiva/sclera: Conjunctivae normal.   Neck:      Musculoskeletal: Normal range of motion.   Cardiovascular:      Rate and Rhythm: Normal rate and regular rhythm.   Pulmonary:      Effort: Pulmonary effort is normal. No respiratory distress.   Abdominal:      General: There is no distension.      Palpations: Abdomen is soft.      Tenderness: There is abdominal tenderness. There is no guarding.      Comments: Well healed surgical scar to midline abdomen. R IR drain just lateral to midline abdomen with <10cc serosanguinous output. Drain insertion site appropriately TTP. No erythema or drainage noted.    Musculoskeletal:          General: No deformity.   Skin:     General: Skin is warm and dry.   Neurological:      Mental Status: She is alert and oriented to person, place, and [place]      Significant Diagnostic Studies: Labs:   BMP:   Recent Labs   Lab 12/16/20  1217 12/17/20  0729 12/18/20  0249    135* 110   * 136 139   K 3.8 4.1 3.9   CL 99 98 99   CO2 25 27 29   BUN 13 12 10   CREATININE 1.0 1.1 1.0   CALCIUM 9.5 9.0 9.0   MG 1.7 1.7 1.9   , CMP   Recent Labs   Lab 12/16/20  1217 12/17/20  0729 12/18/20  0249   * 136 139   K 3.8 4.1 3.9   CL 99 98 99   CO2 25 27 29    135* 110   BUN 13 12 10   CREATININE 1.0 1.1 1.0   CALCIUM 9.5 9.0 9.0   PROT 8.4 7.3 7.6   ALBUMIN 3.4* 3.1* 3.2*   BILITOT 0.6 0.8 0.6   ALKPHOS 128 112 121   AST 15 15 16   ALT 14 13 12   ANIONGAP 11 11 11   ESTGFRAFRICA >60.0 >60.0 >60.0   EGFRNONAA >60.0 57.5* >60.0    and CBC   Recent Labs   Lab 12/16/20  1217 12/17/20  0729 12/18/20  0249   WBC 13.44* 10.39 8.18   HGB 13.5 12.6 12.8   HCT 40.4 38.1 39.6   * 331 329     Microbiology:   Blood Culture   Lab Results   Component Value Date    LABBLOO No Growth to date 12/16/2020    LABBLOO No Growth to date 12/16/2020      and Wound Culture: pending. Grew MSSA in the past    Pending Diagnostic Studies:     None        Final Active Diagnoses:      Problems Resolved During this Admission:    Diagnosis Date Noted Date Resolved POA    PRINCIPAL PROBLEM:  Abdominal wall abscess at site of surgical wound [T81.49XA] 12/16/2020 12/18/2020 Yes      Discharged Condition: good    Disposition: Home or Self Care    Follow Up:  Follow-up Information     Teo Baker MD In 1 week.    Specialty: General Surgery  Why: Post IR drain placement   Contact information:  [address] [place] [address] [phone]                 Patient Instructions:      Diet Adult Regular     Notify your health care provider if you experience any of the following:  increased confusion or weakness      Notify your health care provider if you experience any of the following:  persistent dizziness, light-headedness, or visual disturbances     Notify your health care provider if you experience any of the following:  worsening rash     Notify your health care provider if you experience any of the following:  severe persistent headache     Notify your health care provider if you experience any of the following:  difficulty breathing or increased cough     Notify your health care provider if you experience any of the following:  redness, tenderness, or signs of infection (pain, swelling, redness, odor or green/yellow discharge around incision site)     Notify your health care provider if you experience any of the following:  severe uncontrolled pain     Notify your health care provider if you experience any of the following:  persistent nausea and vomiting or diarrhea     Notify your health care provider if you experience any of the following:  temperature >100.4     No dressing needed   Order Comments: No dressing needed around drain site until desired by patient.   Empty drain PRN and record output on a piece of paper.  Can shower and let water run over drain site, do not soak or submerge.     Activity as tolerated     Medications:  Reconciled Home Medications:      Medication List      START taking these medications    amoxicillin-clavulanate 875-125mg 875-125 mg per tablet  Commonly known as: AUGMENTIN  Take 1 tablet by mouth every 12 (twelve) hours. for 3 days     HYDROcodone-acetaminophen 5-325 mg per tablet  Commonly known as: NORCO  Take 1 tablet by mouth every 6 (six) hours as needed for Pain.        CHANGE how you take these medications    fluconazole 150 MG Tab  Commonly known as: DIFLUCAN  Take 1 tablet (150 mg total) by mouth once daily. for 1 day  What changed:   · how much to take  · how to take this  · when to take this        CONTINUE taking these medications    atenoloL-chlorthalidone 100-25 mg per  tablet  Commonly known as: TENORETIC  Take 1 tablet by mouth once daily.     DULoxetine 60 MG capsule  Commonly known as: CYMBALTA  Take 60 mg by mouth once daily.     escitalopram oxalate 20 MG tablet  Commonly known as: LEXAPRO  Take 20 mg by mouth.     gabapentin 300 MG capsule  Commonly known as: NEURONTIN  Take 1 capsule (300 mg total) by mouth 3 (three) times daily.     hydrOXYzine pamoate 25 MG Cap  Commonly known as: VISTARIL  Take 25 mg by mouth every 8 (eight) hours as needed.     metFORMIN 500 MG tablet  Commonly known as: GLUCOPHAGE  Take 500 mg by mouth 2 (two) times daily with meals.     omeprazole 40 MG capsule  Commonly known as: PRILOSEC  Take 40 mg by mouth.     ondansetron 8 MG tablet  Commonly known as: ZOFRAN  Take 8 mg by mouth every 8 (eight) hours as needed for Nausea.     traZODone 50 MG tablet  Commonly known as: DESYREL  Take 50 mg by mouth nightly as needed.        STOP taking these medications    traMADoL 50 mg tablet  Commonly known as: ULTRAM          Can take OTC Benadryl for itching.     Derick Estrella PA-C  General Surgery  [facility]

## 2020-12-18 NOTE — NURSING
Pt taught how to empty and record LEON drain output.  Measuring cup for consistent output recording given to pt.  Pt demonstrated the ability to empty and record output in front of RN.  Discussed incision and site care.  Pt verbalized understanding of all education provided during the discharge process.  Prescription medications brought to the pt's room prior to discharge.

## 2020-12-18 NOTE — PLAN OF CARE
12/18/20 0941   Final Note   Assessment Type Final Discharge Note     Patient discharging home today. Drove self to hospital. No further needs from Case Management.

## 2020-12-18 NOTE — PLAN OF CARE
No acute changes overnight. PRN oxy and benadryl administered for pain and itching. LEON drain with sanguineous drainage. IV abx administered as ordered. Call light in reach. WCTM

## 2020-12-18 NOTE — NURSING
Pt demonstrated proper technique when emptying LEON drain. Pt instructed to keep a log of drain output. Pt verbalized understanding.

## 2020-12-18 NOTE — PLAN OF CARE
Patient ambulated independently. IR placed a LEON drain to her right abdomen, serosanguinous drainage noted in the tube. VS stable. Plan of care reviewed with patient and questions answered. PRN pain medication administered with moderate relief.

## 2020-12-18 NOTE — PLAN OF CARE
Future Appointments   Date Time Provider Department Center   12/23/2020  1:15 PM Teo Baker MD [facility]           12/18/20 1402   Final Note   Assessment Type Final Discharge Note   Anticipated Discharge Disposition Home   What phone number can be called within the next 1-3 days to see how you are doing after discharge? [phone]   Hospital Follow Up  Appt(s) scheduled? Yes   Right Care Referral Info   Post Acute Recommendation No Care   Post-Acute Status   Post-Acute Authorization Other   Other Status No Post-Acute Service Needs   Discharge Delays None known at this time

## 2020-12-19 LAB — BACTERIA SPEC AEROBE CULT: ABNORMAL

## 2020-12-21 LAB
BACTERIA BLD CULT: NORMAL
BACTERIA BLD CULT: NORMAL

## 2020-12-22 LAB — BACTERIA SPEC ANAEROBE CULT: NORMAL

## 2020-12-23 ENCOUNTER — OFFICE VISIT (OUTPATIENT)
Dept: SURGERY | Facility: CLINIC | Age: 55
End: 2020-12-23
Payer: MEDICARE

## 2020-12-23 VITALS
HEIGHT: 66 IN | BODY MASS INDEX: 35.03 KG/M2 | HEART RATE: 67 BPM | DIASTOLIC BLOOD PRESSURE: 97 MMHG | SYSTOLIC BLOOD PRESSURE: 161 MMHG | WEIGHT: 218 LBS

## 2020-12-23 DIAGNOSIS — L02.211 ABDOMINAL WALL ABSCESS: Primary | ICD-10-CM

## 2020-12-23 PROCEDURE — 99999 PR PBB SHADOW E&M-EST. PATIENT-LVL III: CPT | Mod: PBBFAC,,, | Performed by: SURGERY

## 2020-12-23 PROCEDURE — 99024 POSTOP FOLLOW-UP VISIT: CPT | Mod: POP,,, | Performed by: SURGERY

## 2020-12-23 PROCEDURE — 99213 OFFICE O/P EST LOW 20 MIN: CPT | Mod: PBBFAC | Performed by: SURGERY

## 2020-12-23 PROCEDURE — 99999 PR PBB SHADOW E&M-EST. PATIENT-LVL III: ICD-10-PCS | Mod: PBBFAC,,, | Performed by: SURGERY

## 2020-12-23 PROCEDURE — 99024 PR POST-OP FOLLOW-UP VISIT: ICD-10-PCS | Mod: POP,,, | Performed by: SURGERY

## 2020-12-23 NOTE — PROGRESS NOTES
History & Physical  Surgery      SUBJECTIVE:     Chief Complaint/Reason for Admission: Hospital follow up - abdominal wall abscess drainage    History of Present Illness: Barbara Ortiz is a 55 y.o. female with hx of incisional hernia repair with mesh in 2018 with multiple episodes of abdominal wall abscess presents after recent hospitalization for a recurrent abdominal wall abscess. This was drained by IR. She has recorded drain output daily 120, 90, 60 cc the last 3 days. She is not having fevers, chills, sweating. Her abdominal pain is improving.       Current Outpatient Medications on File Prior to Visit   Medication Sig    atenoloL-chlorthalidone (TENORETIC) 100-25 mg per tablet Take 1 tablet by mouth once daily.    DULoxetine (CYMBALTA) 60 MG capsule Take 60 mg by mouth once daily.    escitalopram oxalate (LEXAPRO) 20 MG tablet Take 20 mg by mouth.    HYDROcodone-acetaminophen (NORCO) 5-325 mg per tablet Take 1 tablet by mouth every 6 (six) hours as needed for Pain.    hydrOXYzine pamoate (VISTARIL) 25 MG Cap Take 25 mg by mouth every 8 (eight) hours as needed.     metFORMIN (GLUCOPHAGE) 500 MG tablet Take 500 mg by mouth 2 (two) times daily with meals.    omeprazole (PRILOSEC) 40 MG capsule Take 40 mg by mouth.    ondansetron (ZOFRAN) 8 MG tablet Take 8 mg by mouth every 8 (eight) hours as needed for Nausea.    traZODone (DESYREL) 50 MG tablet Take 50 mg by mouth nightly as needed.     gabapentin (NEURONTIN) 300 MG capsule Take 1 capsule (300 mg total) by mouth 3 (three) times daily.     No current facility-administered medications on file prior to visit.        Review of patient's allergies indicates:   Allergen Reactions    Losartan Anaphylaxis     Lips and tongue swelling    Dilaudid [hydromorphone] Itching    Fentanyl Itching    Versed [midazolam] Itching       Past Medical History:   Diagnosis Date    Anxiety     Baker's cyst of knee, left     Diabetes mellitus     Diverticulitis      Fibromyalgia     Hypertension     Sleep apnea      Past Surgical History:   Procedure Laterality Date    BRAIN SURGERY      CARPAL TUNNEL RELEASE Right     CHOLECYSTECTOMY      COLOSTOMY      COLOSTOMY CLOSURE  04/10/2018    HERNIA REPAIR  04/10/2018    LUMBAR FUSION      PARTIAL HYSTERECTOMY  2002    REPLACEMENT OF NEPHROSTOMY TUBE N/A 10/14/2019    Procedure: REPLACEMENT, NEPHROSTOMY TUBE;  Surgeon: Stefan Diagnostic Provider;  Location: [facility] OR [address] [place] [address];  Service: Anesthesiology;  Laterality: N/A;  189     No family history on file.  Social History     Tobacco Use    Smoking status: Never Smoker    Smokeless tobacco: Never Used   Substance Use Topics    Alcohol use: Yes     Comment: occasionally    Drug use: No        Review of Systems   Constitutional: Negative for chills, diaphoresis and fever.   HENT: Negative for congestion and sore throat.    Respiratory: Negative for shortness of breath and wheezing.    Cardiovascular: Negative for chest pain and palpitations.   Gastrointestinal: Negative for abdominal distention, abdominal pain, diarrhea, nausea and vomiting.        Abdominal drain with red drainage, mild swelling   Genitourinary: Negative for difficulty urinating and frequency.   Musculoskeletal: Negative for arthralgias and myalgias.   Skin: Negative for color change and wound.   Neurological: Negative for light-headedness and headaches.   Psychiatric/Behavioral: Negative for confusion and hallucinations.     OBJECTIVE:     Vital Signs (Most Recent)  Pulse: 67 (12/23/20 1315)  BP: (!) 161/97 (12/23/20 1315)    Physical Exam  Vitals signs reviewed.   Constitutional:       General: She is not in acute distress.     Appearance: She is well-developed.   HENT:      Head: Normocephalic and atraumatic.   Eyes:      Extraocular Movements: Extraocular movements intact.      Conjunctiva/sclera: Conjunctivae normal.   Neck:      Musculoskeletal: Normal range of motion and neck supple.   Cardiovascular:       Rate and Rhythm: Normal rate and regular rhythm.   Pulmonary:      Effort: Pulmonary effort is normal. No respiratory distress.   Abdominal:      General: There is no distension.      Palpations: Abdomen is soft.      Tenderness: There is no abdominal tenderness.      Comments: IR drain in place to right lower abdominal wall with serosanguinous drainage, induration palpated at drain insertion and towards midline, no fluctuance, no erythema, minimally tender   Musculoskeletal: Normal range of motion.         General: No swelling.   Skin:     General: Skin is warm and dry.   Neurological:      General: No focal deficit present.      Mental Status: She is oriented to person, place, and time.   Psychiatric:         Behavior: Behavior normal.         Thought Content: Thought content normal.       Laboratory  noen    Diagnostic Results:  none    ASSESSMENT/PLAN:     A/P: 54 yo F presents after hospital visit for recurrent abdominal wall abscess hx of ventral hernia repair with mesh in 2018.    Will keep drain in place and follow up in one week, continue to record drain output  Flushed easily in clinic   Will obtain CT after next visit    Brandan Vazquez MD  Surgery PGYIII

## 2020-12-27 ENCOUNTER — PATIENT MESSAGE (OUTPATIENT)
Dept: SURGERY | Facility: CLINIC | Age: 55
End: 2020-12-27

## 2020-12-30 ENCOUNTER — OFFICE VISIT (OUTPATIENT)
Dept: SURGERY | Facility: CLINIC | Age: 55
End: 2020-12-30
Payer: MEDICARE

## 2020-12-30 VITALS
SYSTOLIC BLOOD PRESSURE: 140 MMHG | HEART RATE: 67 BPM | DIASTOLIC BLOOD PRESSURE: 89 MMHG | OXYGEN SATURATION: 98 % | TEMPERATURE: 99 F

## 2020-12-30 DIAGNOSIS — L02.211 ABDOMINAL WALL ABSCESS: Primary | ICD-10-CM

## 2020-12-30 PROCEDURE — 99212 OFFICE O/P EST SF 10 MIN: CPT | Mod: S$PBB,,, | Performed by: SURGERY

## 2020-12-30 PROCEDURE — 99999 PR PBB SHADOW E&M-EST. PATIENT-LVL II: CPT | Mod: PBBFAC,,, | Performed by: SURGERY

## 2020-12-30 PROCEDURE — 99999 PR PBB SHADOW E&M-EST. PATIENT-LVL II: ICD-10-PCS | Mod: PBBFAC,,, | Performed by: SURGERY

## 2020-12-30 PROCEDURE — 99212 PR OFFICE/OUTPT VISIT, EST, LEVL II, 10-19 MIN: ICD-10-PCS | Mod: S$PBB,,, | Performed by: SURGERY

## 2020-12-30 PROCEDURE — 99212 OFFICE O/P EST SF 10 MIN: CPT | Mod: PBBFAC | Performed by: SURGERY

## 2020-12-30 NOTE — PROGRESS NOTES
General Surgery Office Visit   History and Physical    Patient Name: Barbara Ortiz  YOB: 1965 (55 y.o.)  MRN: [id number]  Today's Date: 12/30/2020      SUBJECTIVE:     Chief Complaint: Drain care    History of Present Illness:  Barbara Ortiz is a 55 y.o. female with hx of incisional hernia repair with mesh in 2018 with multiple episodes of abdominal wall abscess presents after recent hospitalization for a recurrent abdominal wall abscess. She reports increased pain at the drain insertion site beginning 4 days ago. Reports associated thick, malodorous drainage from site. Experiences intermittent nausea throughout the day, not associated with food intake and resolves spontaneously. Drainage has improved since onset but still occurs. Denies fever, chills, diaphoresis, malaise, other abdominal pain. Drain putting out an average of 70cc per day at home.       Review of patient's allergies indicates:   Allergen Reactions    Losartan Anaphylaxis     Lips and tongue swelling    Dilaudid [hydromorphone] Itching    Fentanyl Itching    Versed [midazolam] Itching       Past Medical History:   Diagnosis Date    Anxiety     Baker's cyst of knee, left     Diabetes mellitus     Diverticulitis     Fibromyalgia     Hypertension     Sleep apnea      Past Surgical History:   Procedure Laterality Date    BRAIN SURGERY      CARPAL TUNNEL RELEASE Right     CHOLECYSTECTOMY      COLOSTOMY      COLOSTOMY CLOSURE  04/10/2018    HERNIA REPAIR  04/10/2018    LUMBAR FUSION      PARTIAL HYSTERECTOMY  2002    REPLACEMENT OF NEPHROSTOMY TUBE N/A 10/14/2019    Procedure: REPLACEMENT, NEPHROSTOMY TUBE;  Surgeon: Stefan Diagnostic Provider;  Location: [facility] OR [address] [place] [address];  Service: Anesthesiology;  Laterality: N/A;  189     No family history on file.  Social History     Tobacco Use    Smoking status: Never Smoker    Smokeless tobacco: Never Used   Substance Use Topics    Alcohol use: Yes     Comment: occasionally    Drug  use: No        Review of Systems:  Review of Systems   Constitutional: Negative for chills, fever and malaise/fatigue.   Eyes: Negative for blurred vision and double vision.   Respiratory: Negative for cough and shortness of breath.    Cardiovascular: Negative for chest pain and palpitations.   Gastrointestinal: Positive for abdominal pain (at drain site) and nausea. Negative for constipation, diarrhea and vomiting.   Genitourinary: Negative for dysuria and hematuria.   Musculoskeletal: Positive for back pain.   Skin: Negative for rash.   Neurological: Negative for dizziness, weakness and headaches.   Psychiatric/Behavioral: Negative for depression. The patient is nervous/anxious.    All other systems reviewed and are negative.      OBJECTIVE:     Vital Signs (Most Recent)  BP (!) 140/89   Pulse 67   Temp 98.6 °F (37 °C)   SpO2 98%     Physical Exam:  General: Black or  female in no distress   Neuro: alert and oriented x 4.  Moves all extremities.     HEENT: Normocephalic, atraumatic. No icterus.  Trachea midline. EOM intact.   Respiratory: Respirations are even and unlabored  Cardiac: Regular rate.   Abdomen: Soft, non distended. No masses noted. Tenderness mostly at superior aspect of drain insertion site. No fluctuance, drainage, cellulitis, erythema, or increased warmth noted. Pain noted with movement of drain. Drain with thin, SS output  Extremities: Warm dry and intact.   Skin: No rashes. Scar noted to midline abdomen. Incision site clean, dry, and intact with appropriate healing and no signs of infection.      ASSESSMENT/PLAN:     Ms. Ortiz is a 55 year old female with hx of incisional hernia repair with mesh in 2018 with multiple episodes of abdominal wall abscess s/p IR drainage placement. Exam improved from previous visit, no overt signs of infection but drain with consistent output    Drain left in place  Will get CT a/p prior to next visit  Follow up in 1 week   ED precautions  given      Patient seen in conjunction with Dr. Baker.       Derick Estrella, MPAS, PA-C  General Surgery  - [facility]

## 2021-01-04 ENCOUNTER — HOSPITAL ENCOUNTER (OUTPATIENT)
Dept: RADIOLOGY | Facility: HOSPITAL | Age: 56
Discharge: HOME | End: 2021-01-04
Attending: SURGERY
Payer: MEDICARE

## 2021-01-04 DIAGNOSIS — L02.211 ABDOMINAL WALL ABSCESS: ICD-10-CM

## 2021-01-04 PROCEDURE — 74150 CT ABDOMEN W/O CONTRAST: CPT | Mod: TC

## 2021-01-04 PROCEDURE — 74150 CT ABDOMEN WITHOUT CONTRAST: ICD-10-PCS | Mod: 26,,, | Performed by: RADIOLOGY

## 2021-01-04 PROCEDURE — 74150 CT ABDOMEN W/O CONTRAST: CPT | Mod: 26,,, | Performed by: RADIOLOGY

## 2021-01-06 ENCOUNTER — OFFICE VISIT (OUTPATIENT)
Dept: SURGERY | Facility: CLINIC | Age: 56
End: 2021-01-06
Payer: MEDICARE

## 2021-01-06 VITALS
DIASTOLIC BLOOD PRESSURE: 92 MMHG | TEMPERATURE: 99 F | HEART RATE: 49 BPM | OXYGEN SATURATION: 98 % | SYSTOLIC BLOOD PRESSURE: 189 MMHG

## 2021-01-06 DIAGNOSIS — R18.8 INTRAABDOMINAL FLUID COLLECTION: Primary | ICD-10-CM

## 2021-01-06 PROCEDURE — 99999 PR PBB SHADOW E&M-EST. PATIENT-LVL II: CPT | Mod: PBBFAC,,, | Performed by: SURGERY

## 2021-01-06 PROCEDURE — 99999 PR PBB SHADOW E&M-EST. PATIENT-LVL II: ICD-10-PCS | Mod: PBBFAC,,, | Performed by: SURGERY

## 2021-01-06 PROCEDURE — 99212 OFFICE O/P EST SF 10 MIN: CPT | Mod: PBBFAC | Performed by: SURGERY

## 2021-01-06 PROCEDURE — 99213 OFFICE O/P EST LOW 20 MIN: CPT | Mod: S$PBB,,, | Performed by: SURGERY

## 2021-01-06 PROCEDURE — 99213 PR OFFICE/OUTPT VISIT, EST, LEVL III, 20-29 MIN: ICD-10-PCS | Mod: S$PBB,,, | Performed by: SURGERY

## 2021-01-20 LAB — FUNGUS SPEC CULT: NORMAL

## 2021-01-27 ENCOUNTER — OFFICE VISIT (OUTPATIENT)
Dept: SURGERY | Facility: CLINIC | Age: 56
End: 2021-01-27
Payer: MEDICARE

## 2021-01-27 VITALS
OXYGEN SATURATION: 97 % | DIASTOLIC BLOOD PRESSURE: 8 MMHG | HEART RATE: 70 BPM | TEMPERATURE: 99 F | SYSTOLIC BLOOD PRESSURE: 173 MMHG

## 2021-01-27 DIAGNOSIS — K43.2 RECURRENT INCISIONAL HERNIA: Primary | ICD-10-CM

## 2021-01-27 DIAGNOSIS — T85.79XA INFECTED HERNIOPLASTY MESH, INITIAL ENCOUNTER: ICD-10-CM

## 2021-01-27 PROCEDURE — 99999 PR PBB SHADOW E&M-EST. PATIENT-LVL III: ICD-10-PCS | Mod: PBBFAC,,, | Performed by: SURGERY

## 2021-01-27 PROCEDURE — 99213 OFFICE O/P EST LOW 20 MIN: CPT | Mod: S$PBB,,, | Performed by: SURGERY

## 2021-01-27 PROCEDURE — 99999 PR PBB SHADOW E&M-EST. PATIENT-LVL III: CPT | Mod: PBBFAC,,, | Performed by: SURGERY

## 2021-01-27 PROCEDURE — 99213 PR OFFICE/OUTPT VISIT, EST, LEVL III, 20-29 MIN: ICD-10-PCS | Mod: S$PBB,,, | Performed by: SURGERY

## 2021-01-27 PROCEDURE — 99213 OFFICE O/P EST LOW 20 MIN: CPT | Mod: PBBFAC | Performed by: SURGERY

## 2021-02-02 ENCOUNTER — LAB VISIT (OUTPATIENT)
Dept: FAMILY MEDICINE | Facility: CLINIC | Age: 56
End: 2021-02-02
Payer: MEDICARE

## 2021-02-02 DIAGNOSIS — K43.2 RECURRENT INCISIONAL HERNIA: ICD-10-CM

## 2021-02-02 PROCEDURE — U0003 INFECTIOUS AGENT DETECTION BY NUCLEIC ACID (DNA OR RNA); SEVERE ACUTE RESPIRATORY SYNDROME CORONAVIRUS 2 (SARS-COV-2) (CORONAVIRUS DISEASE [COVID-19]), AMPLIFIED PROBE TECHNIQUE, MAKING USE OF HIGH THROUGHPUT TECHNOLOGIES AS DESCRIBED BY CMS-2020-01-R: HCPCS

## 2021-02-03 ENCOUNTER — ANESTHESIA EVENT (OUTPATIENT)
Dept: SURGERY | Facility: HOSPITAL | Age: 56
DRG: 902 | End: 2021-02-03
Payer: MEDICARE

## 2021-02-03 ENCOUNTER — TELEPHONE (OUTPATIENT)
Dept: SURGERY | Facility: CLINIC | Age: 56
End: 2021-02-03

## 2021-02-03 LAB — SARS-COV-2 RNA RESP QL NAA+PROBE: NOT DETECTED

## 2021-02-04 ENCOUNTER — HOSPITAL ENCOUNTER (INPATIENT)
Facility: HOSPITAL | Age: 56
LOS: 4 days | Discharge: HOME HEALTH | DRG: 902 | End: 2021-02-08
Attending: SURGERY | Admitting: SURGERY
Payer: MEDICARE

## 2021-02-04 ENCOUNTER — ANESTHESIA (OUTPATIENT)
Dept: SURGERY | Facility: HOSPITAL | Age: 56
DRG: 902 | End: 2021-02-04
Payer: MEDICARE

## 2021-02-04 DIAGNOSIS — K43.2 INCISIONAL HERNIA: ICD-10-CM

## 2021-02-04 DIAGNOSIS — R18.8 INTRAABDOMINAL FLUID COLLECTION: Primary | ICD-10-CM

## 2021-02-04 LAB
POCT GLUCOSE: 135 MG/DL (ref 70–110)
POCT GLUCOSE: 144 MG/DL (ref 70–110)
POCT GLUCOSE: 173 MG/DL (ref 70–110)

## 2021-02-04 PROCEDURE — 71000016 HC POSTOP RECOV ADDL HR: Performed by: SURGERY

## 2021-02-04 PROCEDURE — 25000003 PHARM REV CODE 250: Performed by: STUDENT IN AN ORGANIZED HEALTH CARE EDUCATION/TRAINING PROGRAM

## 2021-02-04 PROCEDURE — 11000001 HC ACUTE MED/SURG PRIVATE ROOM

## 2021-02-04 PROCEDURE — D9220A PRA ANESTHESIA: Mod: ANES,,, | Performed by: ANESTHESIOLOGY

## 2021-02-04 PROCEDURE — 71000039 HC RECOVERY, EACH ADD'L HOUR: Performed by: SURGERY

## 2021-02-04 PROCEDURE — 87070 CULTURE OTHR SPECIMN AEROBIC: CPT

## 2021-02-04 PROCEDURE — 63600175 PHARM REV CODE 636 W HCPCS: Performed by: STUDENT IN AN ORGANIZED HEALTH CARE EDUCATION/TRAINING PROGRAM

## 2021-02-04 PROCEDURE — 36000706: Performed by: SURGERY

## 2021-02-04 PROCEDURE — C9113 INJ PANTOPRAZOLE SODIUM, VIA: HCPCS | Performed by: STUDENT IN AN ORGANIZED HEALTH CARE EDUCATION/TRAINING PROGRAM

## 2021-02-04 PROCEDURE — 36000707: Performed by: SURGERY

## 2021-02-04 PROCEDURE — 71000033 HC RECOVERY, INTIAL HOUR: Performed by: SURGERY

## 2021-02-04 PROCEDURE — 63600175 PHARM REV CODE 636 W HCPCS: Performed by: ANESTHESIOLOGY

## 2021-02-04 PROCEDURE — 49999 UNLISTED PX ABD PERTM&OMN: CPT | Mod: ,,, | Performed by: SURGERY

## 2021-02-04 PROCEDURE — 88300 PR  SURG PATH,GROSS,LEVEL I: ICD-10-PCS | Mod: 26,,, | Performed by: PATHOLOGY

## 2021-02-04 PROCEDURE — 97605 PR NEG PRESS WOUND THERAPY (NPWT) W/NON-DISPOSABLE WOUND VAC DEVICE (DME), <=50 CM: ICD-10-PCS | Mod: ,,, | Performed by: SURGERY

## 2021-02-04 PROCEDURE — 82962 GLUCOSE BLOOD TEST: CPT | Performed by: SURGERY

## 2021-02-04 PROCEDURE — 25000003 PHARM REV CODE 250: Performed by: ANESTHESIOLOGY

## 2021-02-04 PROCEDURE — D9220A PRA ANESTHESIA: Mod: CRNA,,, | Performed by: STUDENT IN AN ORGANIZED HEALTH CARE EDUCATION/TRAINING PROGRAM

## 2021-02-04 PROCEDURE — 63600175 PHARM REV CODE 636 W HCPCS

## 2021-02-04 PROCEDURE — 88300 SURGICAL PATH GROSS: CPT | Performed by: PATHOLOGY

## 2021-02-04 PROCEDURE — 49999 PR REMOVE MESH FROM ABD WALL FOR INFECTION: ICD-10-PCS | Mod: ,,, | Performed by: SURGERY

## 2021-02-04 PROCEDURE — 37000008 HC ANESTHESIA 1ST 15 MINUTES: Performed by: SURGERY

## 2021-02-04 PROCEDURE — 87075 CULTR BACTERIA EXCEPT BLOOD: CPT

## 2021-02-04 PROCEDURE — 87186 SC STD MICRODIL/AGAR DIL: CPT

## 2021-02-04 PROCEDURE — D9220A PRA ANESTHESIA: ICD-10-PCS | Mod: CRNA,,, | Performed by: STUDENT IN AN ORGANIZED HEALTH CARE EDUCATION/TRAINING PROGRAM

## 2021-02-04 PROCEDURE — 37000009 HC ANESTHESIA EA ADD 15 MINS: Performed by: SURGERY

## 2021-02-04 PROCEDURE — D9220A PRA ANESTHESIA: ICD-10-PCS | Mod: ANES,,, | Performed by: ANESTHESIOLOGY

## 2021-02-04 PROCEDURE — 87077 CULTURE AEROBIC IDENTIFY: CPT

## 2021-02-04 PROCEDURE — 25000003 PHARM REV CODE 250: Performed by: SURGERY

## 2021-02-04 PROCEDURE — 49565 PR REPAIR RECURR INCIS HERNIA,REDUC: CPT | Mod: ,,, | Performed by: SURGERY

## 2021-02-04 PROCEDURE — 88300 SURGICAL PATH GROSS: CPT | Mod: 26,,, | Performed by: PATHOLOGY

## 2021-02-04 PROCEDURE — 71000015 HC POSTOP RECOV 1ST HR: Performed by: SURGERY

## 2021-02-04 PROCEDURE — 97605 NEG PRS WND THER DME<=50SQCM: CPT | Mod: ,,, | Performed by: SURGERY

## 2021-02-04 PROCEDURE — 49565 PR REPAIR RECURR INCIS HERNIA,REDUC: ICD-10-PCS | Mod: ,,, | Performed by: SURGERY

## 2021-02-04 RX ORDER — FENTANYL CITRATE 50 UG/ML
INJECTION, SOLUTION INTRAMUSCULAR; INTRAVENOUS
Status: DISCONTINUED | OUTPATIENT
Start: 2021-02-04 | End: 2021-02-04

## 2021-02-04 RX ORDER — DIPHENHYDRAMINE HYDROCHLORIDE 50 MG/ML
25 INJECTION INTRAMUSCULAR; INTRAVENOUS EVERY 6 HOURS PRN
Status: DISCONTINUED | OUTPATIENT
Start: 2021-02-04 | End: 2021-02-04 | Stop reason: HOSPADM

## 2021-02-04 RX ORDER — TRAZODONE HYDROCHLORIDE 50 MG/1
50 TABLET ORAL NIGHTLY PRN
Status: DISCONTINUED | OUTPATIENT
Start: 2021-02-05 | End: 2021-02-08 | Stop reason: HOSPADM

## 2021-02-04 RX ORDER — LIDOCAINE HYDROCHLORIDE 10 MG/ML
1 INJECTION, SOLUTION EPIDURAL; INFILTRATION; INTRACAUDAL; PERINEURAL ONCE
Status: COMPLETED | OUTPATIENT
Start: 2021-02-04 | End: 2021-02-04

## 2021-02-04 RX ORDER — ROCURONIUM BROMIDE 10 MG/ML
INJECTION, SOLUTION INTRAVENOUS
Status: DISCONTINUED | OUTPATIENT
Start: 2021-02-04 | End: 2021-02-04

## 2021-02-04 RX ORDER — HYDROMORPHONE HYDROCHLORIDE 1 MG/ML
0.2 INJECTION, SOLUTION INTRAMUSCULAR; INTRAVENOUS; SUBCUTANEOUS EVERY 5 MIN PRN
Status: DISCONTINUED | OUTPATIENT
Start: 2021-02-04 | End: 2021-02-04 | Stop reason: HOSPADM

## 2021-02-04 RX ORDER — ROSUVASTATIN CALCIUM 10 MG/1
10 TABLET, COATED ORAL DAILY
Status: DISCONTINUED | OUTPATIENT
Start: 2021-02-05 | End: 2021-02-08 | Stop reason: HOSPADM

## 2021-02-04 RX ORDER — ONDANSETRON 2 MG/ML
8 INJECTION INTRAMUSCULAR; INTRAVENOUS EVERY 6 HOURS PRN
Status: DISCONTINUED | OUTPATIENT
Start: 2021-02-04 | End: 2021-02-08 | Stop reason: HOSPADM

## 2021-02-04 RX ORDER — ONDANSETRON 2 MG/ML
INJECTION INTRAMUSCULAR; INTRAVENOUS
Status: DISCONTINUED | OUTPATIENT
Start: 2021-02-04 | End: 2021-02-04

## 2021-02-04 RX ORDER — MIDAZOLAM HYDROCHLORIDE 1 MG/ML
INJECTION INTRAMUSCULAR; INTRAVENOUS
Status: DISCONTINUED | OUTPATIENT
Start: 2021-02-04 | End: 2021-02-04

## 2021-02-04 RX ORDER — CHLORTHALIDONE 25 MG/1
25 TABLET ORAL DAILY
Status: DISCONTINUED | OUTPATIENT
Start: 2021-02-05 | End: 2021-02-08 | Stop reason: HOSPADM

## 2021-02-04 RX ORDER — CEFAZOLIN SODIUM 1 G/3ML
2 INJECTION, POWDER, FOR SOLUTION INTRAMUSCULAR; INTRAVENOUS
Status: COMPLETED | OUTPATIENT
Start: 2021-02-04 | End: 2021-02-04

## 2021-02-04 RX ORDER — INSULIN ASPART 100 [IU]/ML
1-10 INJECTION, SOLUTION INTRAVENOUS; SUBCUTANEOUS EVERY 6 HOURS PRN
Status: DISCONTINUED | OUTPATIENT
Start: 2021-02-04 | End: 2021-02-08 | Stop reason: HOSPADM

## 2021-02-04 RX ORDER — DEXAMETHASONE SODIUM PHOSPHATE 4 MG/ML
INJECTION, SOLUTION INTRA-ARTICULAR; INTRALESIONAL; INTRAMUSCULAR; INTRAVENOUS; SOFT TISSUE
Status: DISCONTINUED | OUTPATIENT
Start: 2021-02-04 | End: 2021-02-04

## 2021-02-04 RX ORDER — PROPOFOL 10 MG/ML
VIAL (ML) INTRAVENOUS
Status: DISCONTINUED | OUTPATIENT
Start: 2021-02-04 | End: 2021-02-04

## 2021-02-04 RX ORDER — PANTOPRAZOLE SODIUM 40 MG/10ML
40 INJECTION, POWDER, LYOPHILIZED, FOR SOLUTION INTRAVENOUS 2 TIMES DAILY
Status: DISCONTINUED | OUTPATIENT
Start: 2021-02-04 | End: 2021-02-08 | Stop reason: HOSPADM

## 2021-02-04 RX ORDER — SODIUM CHLORIDE 9 MG/ML
INJECTION, SOLUTION INTRAVENOUS CONTINUOUS
Status: DISCONTINUED | OUTPATIENT
Start: 2021-02-04 | End: 2021-02-04

## 2021-02-04 RX ORDER — DIPHENHYDRAMINE HYDROCHLORIDE 50 MG/ML
12.5 INJECTION INTRAMUSCULAR; INTRAVENOUS ONCE
Status: COMPLETED | OUTPATIENT
Start: 2021-02-04 | End: 2021-02-04

## 2021-02-04 RX ORDER — DIPHENHYDRAMINE HYDROCHLORIDE 50 MG/ML
12.5 INJECTION INTRAMUSCULAR; INTRAVENOUS EVERY 4 HOURS PRN
Status: DISCONTINUED | OUTPATIENT
Start: 2021-02-04 | End: 2021-02-06

## 2021-02-04 RX ORDER — ROSUVASTATIN CALCIUM 10 MG/1
10 TABLET, COATED ORAL DAILY
COMMUNITY

## 2021-02-04 RX ORDER — GLUCAGON 1 MG
1 KIT INJECTION
Status: DISCONTINUED | OUTPATIENT
Start: 2021-02-04 | End: 2021-02-08 | Stop reason: HOSPADM

## 2021-02-04 RX ORDER — ONDANSETRON 2 MG/ML
4 INJECTION INTRAMUSCULAR; INTRAVENOUS ONCE AS NEEDED
Status: DISCONTINUED | OUTPATIENT
Start: 2021-02-04 | End: 2021-02-04 | Stop reason: HOSPADM

## 2021-02-04 RX ORDER — ATENOLOL 50 MG/1
100 TABLET ORAL DAILY
Status: DISCONTINUED | OUTPATIENT
Start: 2021-02-05 | End: 2021-02-08 | Stop reason: HOSPADM

## 2021-02-04 RX ORDER — MORPHINE SULFATE 1 MG/ML
INJECTION INTRAVENOUS
Status: DISPENSED
Start: 2021-02-04 | End: 2021-02-05

## 2021-02-04 RX ORDER — NALOXONE HCL 0.4 MG/ML
0.02 VIAL (ML) INJECTION
Status: DISCONTINUED | OUTPATIENT
Start: 2021-02-04 | End: 2021-02-06

## 2021-02-04 RX ORDER — DIPHENHYDRAMINE HYDROCHLORIDE 50 MG/ML
INJECTION INTRAMUSCULAR; INTRAVENOUS
Status: DISCONTINUED | OUTPATIENT
Start: 2021-02-04 | End: 2021-02-04

## 2021-02-04 RX ORDER — GABAPENTIN 400 MG/1
400 CAPSULE ORAL 3 TIMES DAILY
Status: DISCONTINUED | OUTPATIENT
Start: 2021-02-05 | End: 2021-02-08 | Stop reason: HOSPADM

## 2021-02-04 RX ORDER — DULOXETIN HYDROCHLORIDE 60 MG/1
60 CAPSULE, DELAYED RELEASE ORAL DAILY
Status: DISCONTINUED | OUTPATIENT
Start: 2021-02-05 | End: 2021-02-08 | Stop reason: HOSPADM

## 2021-02-04 RX ORDER — BUPIVACAINE HYDROCHLORIDE 2.5 MG/ML
INJECTION, SOLUTION EPIDURAL; INFILTRATION; INTRACAUDAL
Status: DISCONTINUED | OUTPATIENT
Start: 2021-02-04 | End: 2021-02-04 | Stop reason: HOSPADM

## 2021-02-04 RX ORDER — LIDOCAINE HYDROCHLORIDE 20 MG/ML
INJECTION, SOLUTION EPIDURAL; INFILTRATION; INTRACAUDAL; PERINEURAL
Status: DISCONTINUED | OUTPATIENT
Start: 2021-02-04 | End: 2021-02-04

## 2021-02-04 RX ORDER — MORPHINE SULFATE 1 MG/ML
INJECTION INTRAVENOUS CONTINUOUS
Status: DISCONTINUED | OUTPATIENT
Start: 2021-02-04 | End: 2021-02-06

## 2021-02-04 RX ORDER — KETAMINE HCL IN 0.9 % NACL 50 MG/5 ML
SYRINGE (ML) INTRAVENOUS
Status: DISCONTINUED | OUTPATIENT
Start: 2021-02-04 | End: 2021-02-04

## 2021-02-04 RX ORDER — FENTANYL CITRATE 50 UG/ML
25 INJECTION, SOLUTION INTRAMUSCULAR; INTRAVENOUS EVERY 5 MIN PRN
Status: DISCONTINUED | OUTPATIENT
Start: 2021-02-04 | End: 2021-02-04 | Stop reason: HOSPADM

## 2021-02-04 RX ORDER — SODIUM CHLORIDE 9 MG/ML
INJECTION, SOLUTION INTRAVENOUS CONTINUOUS
Status: DISCONTINUED | OUTPATIENT
Start: 2021-02-04 | End: 2021-02-07

## 2021-02-04 RX ORDER — NEOSTIGMINE METHYLSULFATE 0.5 MG/ML
INJECTION, SOLUTION INTRAVENOUS
Status: DISCONTINUED | OUTPATIENT
Start: 2021-02-04 | End: 2021-02-04

## 2021-02-04 RX ADMIN — DIPHENHYDRAMINE HYDROCHLORIDE 12.5 MG: 50 INJECTION, SOLUTION INTRAMUSCULAR; INTRAVENOUS at 01:02

## 2021-02-04 RX ADMIN — LIDOCAINE HYDROCHLORIDE 60 MG: 20 INJECTION, SOLUTION EPIDURAL; INFILTRATION; INTRACAUDAL at 12:02

## 2021-02-04 RX ADMIN — CEFAZOLIN 2 G: 330 INJECTION, POWDER, FOR SOLUTION INTRAMUSCULAR; INTRAVENOUS at 12:02

## 2021-02-04 RX ADMIN — SODIUM CHLORIDE, SODIUM GLUCONATE, SODIUM ACETATE, POTASSIUM CHLORIDE, MAGNESIUM CHLORIDE, SODIUM PHOSPHATE, DIBASIC, AND POTASSIUM PHOSPHATE: .53; .5; .37; .037; .03; .012; .00082 INJECTION, SOLUTION INTRAVENOUS at 01:02

## 2021-02-04 RX ADMIN — INSULIN ASPART 2 UNITS: 100 INJECTION, SOLUTION INTRAVENOUS; SUBCUTANEOUS at 03:02

## 2021-02-04 RX ADMIN — PROPOFOL 50 MG: 10 INJECTION, EMULSION INTRAVENOUS at 01:02

## 2021-02-04 RX ADMIN — ROCURONIUM BROMIDE 50 MG: 10 INJECTION, SOLUTION INTRAVENOUS at 12:02

## 2021-02-04 RX ADMIN — HYDROMORPHONE HYDROCHLORIDE 0.2 MG: 1 INJECTION, SOLUTION INTRAMUSCULAR; INTRAVENOUS; SUBCUTANEOUS at 02:02

## 2021-02-04 RX ADMIN — SODIUM CHLORIDE: 0.9 INJECTION, SOLUTION INTRAVENOUS at 11:02

## 2021-02-04 RX ADMIN — Medication 30 MG: at 12:02

## 2021-02-04 RX ADMIN — FENTANYL CITRATE 100 MCG: 50 INJECTION INTRAMUSCULAR; INTRAVENOUS at 12:02

## 2021-02-04 RX ADMIN — PROPOFOL 200 MG: 10 INJECTION, EMULSION INTRAVENOUS at 12:02

## 2021-02-04 RX ADMIN — PANTOPRAZOLE SODIUM 40 MG: 40 INJECTION, POWDER, FOR SOLUTION INTRAVENOUS at 11:02

## 2021-02-04 RX ADMIN — LIDOCAINE HYDROCHLORIDE 1 MG: 10 INJECTION, SOLUTION EPIDURAL; INFILTRATION; INTRACAUDAL at 11:02

## 2021-02-04 RX ADMIN — MIDAZOLAM 2 MG: 1 INJECTION INTRAMUSCULAR; INTRAVENOUS at 12:02

## 2021-02-04 RX ADMIN — NEOSTIGMINE METHYLSULFATE 5 MG: 0.5 INJECTION INTRAVENOUS at 01:02

## 2021-02-04 RX ADMIN — DIPHENHYDRAMINE HYDROCHLORIDE 12.5 MG: 50 INJECTION, SOLUTION INTRAMUSCULAR; INTRAVENOUS at 09:02

## 2021-02-04 RX ADMIN — DEXAMETHASONE SODIUM PHOSPHATE 8 MG: 4 INJECTION INTRA-ARTICULAR; INTRALESIONAL; INTRAMUSCULAR; INTRAVENOUS; SOFT TISSUE at 12:02

## 2021-02-04 RX ADMIN — DIPHENHYDRAMINE HYDROCHLORIDE 12.5 MG: 50 INJECTION, SOLUTION INTRAMUSCULAR; INTRAVENOUS at 07:02

## 2021-02-04 RX ADMIN — ONDANSETRON 4 MG: 2 INJECTION INTRAMUSCULAR; INTRAVENOUS at 12:02

## 2021-02-04 RX ADMIN — MORPHINE SULFATE: 1 INJECTION INTRAVENOUS at 02:02

## 2021-02-04 RX ADMIN — GLYCOPYRROLATE 0.6 MCG: 0.2 INJECTION, SOLUTION INTRAMUSCULAR; INTRAVITREAL at 01:02

## 2021-02-04 RX ADMIN — SODIUM CHLORIDE: 0.9 INJECTION, SOLUTION INTRAVENOUS at 02:02

## 2021-02-05 LAB
ANION GAP SERPL CALC-SCNC: 10 MMOL/L (ref 8–16)
BUN SERPL-MCNC: 15 MG/DL (ref 6–20)
CALCIUM SERPL-MCNC: 8.8 MG/DL (ref 8.7–10.5)
CHLORIDE SERPL-SCNC: 101 MMOL/L (ref 95–110)
CO2 SERPL-SCNC: 24 MMOL/L (ref 23–29)
CREAT SERPL-MCNC: 0.9 MG/DL (ref 0.5–1.4)
EST. GFR  (AFRICAN AMERICAN): >60 ML/MIN/1.73 M^2
EST. GFR  (NON AFRICAN AMERICAN): >60 ML/MIN/1.73 M^2
GLUCOSE SERPL-MCNC: 116 MG/DL (ref 70–110)
MAGNESIUM SERPL-MCNC: 2 MG/DL (ref 1.6–2.6)
POCT GLUCOSE: 105 MG/DL (ref 70–110)
POCT GLUCOSE: 162 MG/DL (ref 70–110)
POTASSIUM SERPL-SCNC: 3.8 MMOL/L (ref 3.5–5.1)
SODIUM SERPL-SCNC: 135 MMOL/L (ref 136–145)

## 2021-02-05 PROCEDURE — 63600175 PHARM REV CODE 636 W HCPCS: Performed by: STUDENT IN AN ORGANIZED HEALTH CARE EDUCATION/TRAINING PROGRAM

## 2021-02-05 PROCEDURE — 36415 COLL VENOUS BLD VENIPUNCTURE: CPT

## 2021-02-05 PROCEDURE — C1751 CATH, INF, PER/CENT/MIDLINE: HCPCS

## 2021-02-05 PROCEDURE — 80048 BASIC METABOLIC PNL TOTAL CA: CPT

## 2021-02-05 PROCEDURE — 76937 US GUIDE VASCULAR ACCESS: CPT

## 2021-02-05 PROCEDURE — 36410 VNPNXR 3YR/> PHY/QHP DX/THER: CPT

## 2021-02-05 PROCEDURE — 11000001 HC ACUTE MED/SURG PRIVATE ROOM

## 2021-02-05 PROCEDURE — 25000003 PHARM REV CODE 250: Performed by: STUDENT IN AN ORGANIZED HEALTH CARE EDUCATION/TRAINING PROGRAM

## 2021-02-05 PROCEDURE — C9113 INJ PANTOPRAZOLE SODIUM, VIA: HCPCS | Performed by: STUDENT IN AN ORGANIZED HEALTH CARE EDUCATION/TRAINING PROGRAM

## 2021-02-05 PROCEDURE — 83735 ASSAY OF MAGNESIUM: CPT

## 2021-02-05 RX ORDER — ENOXAPARIN SODIUM 100 MG/ML
40 INJECTION SUBCUTANEOUS EVERY 24 HOURS
Status: DISCONTINUED | OUTPATIENT
Start: 2021-02-05 | End: 2021-02-08 | Stop reason: HOSPADM

## 2021-02-05 RX ADMIN — SODIUM CHLORIDE: 0.9 INJECTION, SOLUTION INTRAVENOUS at 05:02

## 2021-02-05 RX ADMIN — ATENOLOL 100 MG: 50 TABLET ORAL at 10:02

## 2021-02-05 RX ADMIN — DULOXETINE HYDROCHLORIDE 60 MG: 60 CAPSULE, DELAYED RELEASE ORAL at 10:02

## 2021-02-05 RX ADMIN — TRAZODONE HYDROCHLORIDE 50 MG: 50 TABLET ORAL at 08:02

## 2021-02-05 RX ADMIN — ENOXAPARIN SODIUM 40 MG: 40 INJECTION SUBCUTANEOUS at 04:02

## 2021-02-05 RX ADMIN — PANTOPRAZOLE SODIUM 40 MG: 40 INJECTION, POWDER, FOR SOLUTION INTRAVENOUS at 10:02

## 2021-02-05 RX ADMIN — CHLORTHALIDONE 25 MG: 25 TABLET ORAL at 10:02

## 2021-02-05 RX ADMIN — PANTOPRAZOLE SODIUM 40 MG: 40 INJECTION, POWDER, FOR SOLUTION INTRAVENOUS at 08:02

## 2021-02-05 RX ADMIN — DIPHENHYDRAMINE HYDROCHLORIDE 12.5 MG: 50 INJECTION, SOLUTION INTRAMUSCULAR; INTRAVENOUS at 08:02

## 2021-02-05 RX ADMIN — GABAPENTIN 400 MG: 400 CAPSULE ORAL at 04:02

## 2021-02-05 RX ADMIN — DIPHENHYDRAMINE HYDROCHLORIDE 12.5 MG: 50 INJECTION, SOLUTION INTRAMUSCULAR; INTRAVENOUS at 05:02

## 2021-02-05 RX ADMIN — DIPHENHYDRAMINE HYDROCHLORIDE 12.5 MG: 50 INJECTION, SOLUTION INTRAMUSCULAR; INTRAVENOUS at 10:02

## 2021-02-05 RX ADMIN — GABAPENTIN 400 MG: 400 CAPSULE ORAL at 10:02

## 2021-02-05 RX ADMIN — GABAPENTIN 400 MG: 400 CAPSULE ORAL at 08:02

## 2021-02-05 RX ADMIN — DIPHENHYDRAMINE HYDROCHLORIDE 12.5 MG: 50 INJECTION, SOLUTION INTRAMUSCULAR; INTRAVENOUS at 01:02

## 2021-02-05 RX ADMIN — ROSUVASTATIN CALCIUM 10 MG: 10 TABLET, FILM COATED ORAL at 10:02

## 2021-02-05 RX ADMIN — MORPHINE SULFATE: 1 INJECTION INTRAVENOUS at 01:02

## 2021-02-06 LAB
MAGNESIUM SERPL-MCNC: 2.1 MG/DL (ref 1.6–2.6)
POCT GLUCOSE: 115 MG/DL (ref 70–110)
POCT GLUCOSE: 118 MG/DL (ref 70–110)
POCT GLUCOSE: 119 MG/DL (ref 70–110)
POCT GLUCOSE: 136 MG/DL (ref 70–110)
POCT GLUCOSE: 164 MG/DL (ref 70–110)

## 2021-02-06 PROCEDURE — 63600175 PHARM REV CODE 636 W HCPCS: Performed by: STUDENT IN AN ORGANIZED HEALTH CARE EDUCATION/TRAINING PROGRAM

## 2021-02-06 PROCEDURE — 11000001 HC ACUTE MED/SURG PRIVATE ROOM

## 2021-02-06 PROCEDURE — 25000003 PHARM REV CODE 250: Performed by: STUDENT IN AN ORGANIZED HEALTH CARE EDUCATION/TRAINING PROGRAM

## 2021-02-06 PROCEDURE — 83735 ASSAY OF MAGNESIUM: CPT

## 2021-02-06 PROCEDURE — C9113 INJ PANTOPRAZOLE SODIUM, VIA: HCPCS | Performed by: STUDENT IN AN ORGANIZED HEALTH CARE EDUCATION/TRAINING PROGRAM

## 2021-02-06 PROCEDURE — 36415 COLL VENOUS BLD VENIPUNCTURE: CPT

## 2021-02-06 RX ORDER — ACETAMINOPHEN 325 MG/1
650 TABLET ORAL EVERY 6 HOURS PRN
Status: DISCONTINUED | OUTPATIENT
Start: 2021-02-06 | End: 2021-02-08 | Stop reason: HOSPADM

## 2021-02-06 RX ORDER — OXYCODONE HYDROCHLORIDE 10 MG/1
10 TABLET ORAL
Status: DISCONTINUED | OUTPATIENT
Start: 2021-02-06 | End: 2021-02-08 | Stop reason: HOSPADM

## 2021-02-06 RX ORDER — OXYCODONE HYDROCHLORIDE 5 MG/1
5 TABLET ORAL
Status: DISCONTINUED | OUTPATIENT
Start: 2021-02-06 | End: 2021-02-08 | Stop reason: HOSPADM

## 2021-02-06 RX ORDER — METHOCARBAMOL 500 MG/1
1000 TABLET, FILM COATED ORAL 3 TIMES DAILY
Status: DISCONTINUED | OUTPATIENT
Start: 2021-02-06 | End: 2021-02-08 | Stop reason: HOSPADM

## 2021-02-06 RX ADMIN — OXYCODONE 5 MG: 5 TABLET ORAL at 03:02

## 2021-02-06 RX ADMIN — TRAZODONE HYDROCHLORIDE 50 MG: 50 TABLET ORAL at 09:02

## 2021-02-06 RX ADMIN — PANTOPRAZOLE SODIUM 40 MG: 40 INJECTION, POWDER, FOR SOLUTION INTRAVENOUS at 08:02

## 2021-02-06 RX ADMIN — DULOXETINE HYDROCHLORIDE 60 MG: 60 CAPSULE, DELAYED RELEASE ORAL at 08:02

## 2021-02-06 RX ADMIN — METHOCARBAMOL 1000 MG: 500 TABLET ORAL at 09:02

## 2021-02-06 RX ADMIN — ATENOLOL 100 MG: 50 TABLET ORAL at 08:02

## 2021-02-06 RX ADMIN — ROSUVASTATIN CALCIUM 10 MG: 10 TABLET, FILM COATED ORAL at 08:02

## 2021-02-06 RX ADMIN — OXYCODONE HYDROCHLORIDE 10 MG: 10 TABLET ORAL at 08:02

## 2021-02-06 RX ADMIN — GABAPENTIN 400 MG: 400 CAPSULE ORAL at 03:02

## 2021-02-06 RX ADMIN — GABAPENTIN 400 MG: 400 CAPSULE ORAL at 09:02

## 2021-02-06 RX ADMIN — GABAPENTIN 400 MG: 400 CAPSULE ORAL at 08:02

## 2021-02-06 RX ADMIN — OXYCODONE HYDROCHLORIDE 10 MG: 10 TABLET ORAL at 09:02

## 2021-02-06 RX ADMIN — ENOXAPARIN SODIUM 40 MG: 40 INJECTION SUBCUTANEOUS at 04:02

## 2021-02-06 RX ADMIN — METHOCARBAMOL 1000 MG: 500 TABLET ORAL at 03:02

## 2021-02-06 RX ADMIN — PANTOPRAZOLE SODIUM 40 MG: 40 INJECTION, POWDER, FOR SOLUTION INTRAVENOUS at 09:02

## 2021-02-06 RX ADMIN — OXYCODONE HYDROCHLORIDE 10 MG: 10 TABLET ORAL at 11:02

## 2021-02-06 RX ADMIN — CHLORTHALIDONE 25 MG: 25 TABLET ORAL at 09:02

## 2021-02-06 RX ADMIN — MORPHINE SULFATE: 1 INJECTION INTRAVENOUS at 06:02

## 2021-02-06 RX ADMIN — OXYCODONE 5 MG: 5 TABLET ORAL at 06:02

## 2021-02-07 LAB
MAGNESIUM SERPL-MCNC: 2 MG/DL (ref 1.6–2.6)
POCT GLUCOSE: 146 MG/DL (ref 70–110)
POCT GLUCOSE: 188 MG/DL (ref 70–110)

## 2021-02-07 PROCEDURE — 25000003 PHARM REV CODE 250: Performed by: STUDENT IN AN ORGANIZED HEALTH CARE EDUCATION/TRAINING PROGRAM

## 2021-02-07 PROCEDURE — 36415 COLL VENOUS BLD VENIPUNCTURE: CPT

## 2021-02-07 PROCEDURE — C9113 INJ PANTOPRAZOLE SODIUM, VIA: HCPCS | Performed by: STUDENT IN AN ORGANIZED HEALTH CARE EDUCATION/TRAINING PROGRAM

## 2021-02-07 PROCEDURE — 83735 ASSAY OF MAGNESIUM: CPT

## 2021-02-07 PROCEDURE — 63600175 PHARM REV CODE 636 W HCPCS: Performed by: STUDENT IN AN ORGANIZED HEALTH CARE EDUCATION/TRAINING PROGRAM

## 2021-02-07 PROCEDURE — 11000001 HC ACUTE MED/SURG PRIVATE ROOM

## 2021-02-07 RX ADMIN — OXYCODONE HYDROCHLORIDE 10 MG: 10 TABLET ORAL at 03:02

## 2021-02-07 RX ADMIN — INSULIN ASPART 2 UNITS: 100 INJECTION, SOLUTION INTRAVENOUS; SUBCUTANEOUS at 11:02

## 2021-02-07 RX ADMIN — OXYCODONE 5 MG: 5 TABLET ORAL at 09:02

## 2021-02-07 RX ADMIN — GABAPENTIN 400 MG: 400 CAPSULE ORAL at 08:02

## 2021-02-07 RX ADMIN — OXYCODONE HYDROCHLORIDE 10 MG: 10 TABLET ORAL at 06:02

## 2021-02-07 RX ADMIN — PANTOPRAZOLE SODIUM 40 MG: 40 INJECTION, POWDER, FOR SOLUTION INTRAVENOUS at 08:02

## 2021-02-07 RX ADMIN — OXYCODONE HYDROCHLORIDE 10 MG: 10 TABLET ORAL at 09:02

## 2021-02-07 RX ADMIN — ENOXAPARIN SODIUM 40 MG: 40 INJECTION SUBCUTANEOUS at 04:02

## 2021-02-07 RX ADMIN — GABAPENTIN 400 MG: 400 CAPSULE ORAL at 03:02

## 2021-02-07 RX ADMIN — METHOCARBAMOL 1000 MG: 500 TABLET ORAL at 08:02

## 2021-02-07 RX ADMIN — ROSUVASTATIN CALCIUM 10 MG: 10 TABLET, FILM COATED ORAL at 08:02

## 2021-02-07 RX ADMIN — ATENOLOL 100 MG: 50 TABLET ORAL at 08:02

## 2021-02-07 RX ADMIN — METHOCARBAMOL 1000 MG: 500 TABLET ORAL at 03:02

## 2021-02-07 RX ADMIN — DULOXETINE HYDROCHLORIDE 60 MG: 60 CAPSULE, DELAYED RELEASE ORAL at 08:02

## 2021-02-07 RX ADMIN — ACETAMINOPHEN 650 MG: 325 TABLET ORAL at 11:02

## 2021-02-07 RX ADMIN — CHLORTHALIDONE 25 MG: 25 TABLET ORAL at 08:02

## 2021-02-07 RX ADMIN — OXYCODONE HYDROCHLORIDE 10 MG: 10 TABLET ORAL at 12:02

## 2021-02-08 VITALS
HEART RATE: 61 BPM | TEMPERATURE: 98 F | OXYGEN SATURATION: 98 % | BODY MASS INDEX: 35.36 KG/M2 | RESPIRATION RATE: 18 BRPM | HEIGHT: 66 IN | WEIGHT: 220 LBS | DIASTOLIC BLOOD PRESSURE: 85 MMHG | SYSTOLIC BLOOD PRESSURE: 123 MMHG

## 2021-02-08 PROBLEM — K43.2 INCISIONAL HERNIA: Status: RESOLVED | Noted: 2018-04-10 | Resolved: 2021-02-08

## 2021-02-08 LAB
BACTERIA SPEC AEROBE CULT: ABNORMAL
MAGNESIUM SERPL-MCNC: 1.9 MG/DL (ref 1.6–2.6)
POCT GLUCOSE: 128 MG/DL (ref 70–110)
POCT GLUCOSE: 130 MG/DL (ref 70–110)
POCT GLUCOSE: 156 MG/DL (ref 70–110)

## 2021-02-08 PROCEDURE — 25000003 PHARM REV CODE 250: Performed by: STUDENT IN AN ORGANIZED HEALTH CARE EDUCATION/TRAINING PROGRAM

## 2021-02-08 PROCEDURE — 83735 ASSAY OF MAGNESIUM: CPT

## 2021-02-08 PROCEDURE — 63600175 PHARM REV CODE 636 W HCPCS: Performed by: STUDENT IN AN ORGANIZED HEALTH CARE EDUCATION/TRAINING PROGRAM

## 2021-02-08 PROCEDURE — 36415 COLL VENOUS BLD VENIPUNCTURE: CPT

## 2021-02-08 PROCEDURE — C9113 INJ PANTOPRAZOLE SODIUM, VIA: HCPCS | Performed by: STUDENT IN AN ORGANIZED HEALTH CARE EDUCATION/TRAINING PROGRAM

## 2021-02-08 RX ORDER — POLYETHYLENE GLYCOL 3350 17 G/17G
17 POWDER, FOR SOLUTION ORAL DAILY
Status: DISCONTINUED | OUTPATIENT
Start: 2021-02-08 | End: 2021-02-08 | Stop reason: HOSPADM

## 2021-02-08 RX ORDER — BISACODYL 10 MG
10 SUPPOSITORY, RECTAL RECTAL ONCE
Status: COMPLETED | OUTPATIENT
Start: 2021-02-08 | End: 2021-02-08

## 2021-02-08 RX ORDER — OXYCODONE AND ACETAMINOPHEN 5; 325 MG/1; MG/1
1 TABLET ORAL EVERY 4 HOURS PRN
Qty: 20 TABLET | Refills: 0 | Status: SHIPPED | OUTPATIENT
Start: 2021-02-08

## 2021-02-08 RX ORDER — METHOCARBAMOL 500 MG/1
500 TABLET, FILM COATED ORAL EVERY 6 HOURS PRN
Qty: 20 TABLET | Refills: 0 | Status: SHIPPED | OUTPATIENT
Start: 2021-02-08 | End: 2021-02-13

## 2021-02-08 RX ORDER — HYDROXYZINE PAMOATE 25 MG/1
25 CAPSULE ORAL EVERY 8 HOURS PRN
Status: DISCONTINUED | OUTPATIENT
Start: 2021-02-08 | End: 2021-02-08 | Stop reason: HOSPADM

## 2021-02-08 RX ADMIN — GABAPENTIN 400 MG: 400 CAPSULE ORAL at 02:02

## 2021-02-08 RX ADMIN — OXYCODONE HYDROCHLORIDE 10 MG: 10 TABLET ORAL at 12:02

## 2021-02-08 RX ADMIN — HYDROXYZINE PAMOATE 25 MG: 25 CAPSULE ORAL at 06:02

## 2021-02-08 RX ADMIN — DULOXETINE HYDROCHLORIDE 60 MG: 60 CAPSULE, DELAYED RELEASE ORAL at 08:02

## 2021-02-08 RX ADMIN — INSULIN ASPART 1 UNITS: 100 INJECTION, SOLUTION INTRAVENOUS; SUBCUTANEOUS at 12:02

## 2021-02-08 RX ADMIN — ROSUVASTATIN CALCIUM 10 MG: 10 TABLET, FILM COATED ORAL at 08:02

## 2021-02-08 RX ADMIN — POLYETHYLENE GLYCOL 3350 17 G: 17 POWDER, FOR SOLUTION ORAL at 08:02

## 2021-02-08 RX ADMIN — ATENOLOL 100 MG: 50 TABLET ORAL at 08:02

## 2021-02-08 RX ADMIN — BISACODYL 10 MG: 10 SUPPOSITORY RECTAL at 08:02

## 2021-02-08 RX ADMIN — ACETAMINOPHEN 650 MG: 325 TABLET ORAL at 04:02

## 2021-02-08 RX ADMIN — PANTOPRAZOLE SODIUM 40 MG: 40 INJECTION, POWDER, FOR SOLUTION INTRAVENOUS at 08:02

## 2021-02-08 RX ADMIN — METHOCARBAMOL 1000 MG: 500 TABLET ORAL at 08:02

## 2021-02-08 RX ADMIN — OXYCODONE 5 MG: 5 TABLET ORAL at 04:02

## 2021-02-08 RX ADMIN — GABAPENTIN 400 MG: 400 CAPSULE ORAL at 08:02

## 2021-02-08 RX ADMIN — METHOCARBAMOL 1000 MG: 500 TABLET ORAL at 02:02

## 2021-02-08 RX ADMIN — CHLORTHALIDONE 25 MG: 25 TABLET ORAL at 08:02

## 2021-02-10 ENCOUNTER — PATIENT MESSAGE (OUTPATIENT)
Dept: SURGERY | Facility: CLINIC | Age: 56
End: 2021-02-10

## 2021-02-10 LAB
FINAL PATHOLOGIC DIAGNOSIS: NORMAL
GROSS: NORMAL
Lab: NORMAL

## 2021-02-11 ENCOUNTER — PATIENT OUTREACH (OUTPATIENT)
Dept: ADMINISTRATIVE | Facility: CLINIC | Age: 56
End: 2021-02-11

## 2021-02-11 LAB — BACTERIA SPEC ANAEROBE CULT: NORMAL

## 2021-02-12 ENCOUNTER — TELEPHONE (OUTPATIENT)
Dept: SURGERY | Facility: CLINIC | Age: 56
End: 2021-02-12

## 2021-02-12 RX ORDER — HYDROCODONE BITARTRATE AND ACETAMINOPHEN 5; 325 MG/1; MG/1
1 TABLET ORAL EVERY 6 HOURS PRN
Qty: 10 TABLET | Refills: 0 | Status: SHIPPED | OUTPATIENT
Start: 2021-02-12

## 2021-02-15 ENCOUNTER — TELEPHONE (OUTPATIENT)
Dept: SURGERY | Facility: CLINIC | Age: 56
End: 2021-02-15

## 2021-02-24 ENCOUNTER — OFFICE VISIT (OUTPATIENT)
Dept: SURGERY | Facility: CLINIC | Age: 56
End: 2021-02-24
Payer: MEDICARE

## 2021-02-24 VITALS
SYSTOLIC BLOOD PRESSURE: 193 MMHG | DIASTOLIC BLOOD PRESSURE: 99 MMHG | TEMPERATURE: 99 F | OXYGEN SATURATION: 99 % | HEART RATE: 111 BPM

## 2021-02-24 DIAGNOSIS — L02.211 ABDOMINAL WALL ABSCESS: Primary | ICD-10-CM

## 2021-02-24 PROCEDURE — 99999 PR PBB SHADOW E&M-EST. PATIENT-LVL II: ICD-10-PCS | Mod: PBBFAC,,, | Performed by: SURGERY

## 2021-02-24 PROCEDURE — 99024 POSTOP FOLLOW-UP VISIT: CPT | Mod: POP,,, | Performed by: SURGERY

## 2021-02-24 PROCEDURE — 99999 PR PBB SHADOW E&M-EST. PATIENT-LVL II: CPT | Mod: PBBFAC,,, | Performed by: SURGERY

## 2021-02-24 PROCEDURE — 99024 PR POST-OP FOLLOW-UP VISIT: ICD-10-PCS | Mod: POP,,, | Performed by: SURGERY

## 2021-02-24 PROCEDURE — 99212 OFFICE O/P EST SF 10 MIN: CPT | Mod: PBBFAC | Performed by: SURGERY

## 2021-03-10 ENCOUNTER — OFFICE VISIT (OUTPATIENT)
Dept: SURGERY | Facility: CLINIC | Age: 56
End: 2021-03-10
Payer: MEDICARE

## 2021-03-10 VITALS
OXYGEN SATURATION: 97 % | BODY MASS INDEX: 35.32 KG/M2 | WEIGHT: 219.81 LBS | RESPIRATION RATE: 18 BRPM | DIASTOLIC BLOOD PRESSURE: 70 MMHG | HEIGHT: 66 IN | SYSTOLIC BLOOD PRESSURE: 140 MMHG | HEART RATE: 71 BPM

## 2021-03-10 DIAGNOSIS — Z98.890 S/P EXPLORATORY LAPAROTOMY: Primary | ICD-10-CM

## 2021-03-10 PROCEDURE — 99024 PR POST-OP FOLLOW-UP VISIT: ICD-10-PCS | Mod: POP,,, | Performed by: SURGERY

## 2021-03-10 PROCEDURE — 99999 PR PBB SHADOW E&M-EST. PATIENT-LVL IV: ICD-10-PCS | Mod: PBBFAC,,, | Performed by: SURGERY

## 2021-03-10 PROCEDURE — 99214 OFFICE O/P EST MOD 30 MIN: CPT | Mod: PBBFAC | Performed by: SURGERY

## 2021-03-10 PROCEDURE — 99999 PR PBB SHADOW E&M-EST. PATIENT-LVL IV: CPT | Mod: PBBFAC,,, | Performed by: SURGERY

## 2021-03-10 PROCEDURE — 99024 POSTOP FOLLOW-UP VISIT: CPT | Mod: POP,,, | Performed by: SURGERY

## 2021-03-10 RX ORDER — TRIAMTERENE AND HYDROCHLOROTHIAZIDE 37.5; 25 MG/1; MG/1
1 CAPSULE ORAL DAILY
COMMUNITY
Start: 2021-01-09

## 2021-03-10 RX ORDER — ATENOLOL 100 MG/1
TABLET ORAL
COMMUNITY
Start: 2021-03-02

## 2021-03-10 RX ORDER — TRAMADOL HYDROCHLORIDE 50 MG/1
TABLET ORAL
COMMUNITY
Start: 2021-02-22

## 2021-03-10 RX ORDER — QUETIAPINE FUMARATE 25 MG/1
TABLET, FILM COATED ORAL
COMMUNITY
Start: 2021-02-02

## 2021-03-10 RX ORDER — TIZANIDINE 4 MG/1
TABLET ORAL
COMMUNITY
Start: 2021-02-22

## 2021-03-24 ENCOUNTER — OFFICE VISIT (OUTPATIENT)
Dept: SURGERY | Facility: CLINIC | Age: 56
End: 2021-03-24
Payer: MEDICARE

## 2021-03-24 ENCOUNTER — DOCUMENT SCAN (OUTPATIENT)
Dept: HOME HEALTH SERVICES | Facility: HOSPITAL | Age: 56
End: 2021-03-24
Payer: MEDICARE

## 2021-03-24 DIAGNOSIS — Z98.890 S/P EXPLORATORY LAPAROTOMY: Primary | ICD-10-CM

## 2021-03-24 PROCEDURE — 99024 PR POST-OP FOLLOW-UP VISIT: ICD-10-PCS | Mod: POP,,, | Performed by: SURGERY

## 2021-03-24 PROCEDURE — 99024 POSTOP FOLLOW-UP VISIT: CPT | Mod: POP,,, | Performed by: SURGERY

## 2021-03-30 ENCOUNTER — EXTERNAL HOME HEALTH (OUTPATIENT)
Dept: HOME HEALTH SERVICES | Facility: HOSPITAL | Age: 56
End: 2021-03-30
Payer: MEDICARE

## 2021-03-30 PROCEDURE — G0180 MD CERTIFICATION HHA PATIENT: HCPCS | Mod: ,,, | Performed by: SURGERY

## 2021-03-30 PROCEDURE — G0180 PR HOME HEALTH MD CERTIFICATION: ICD-10-PCS | Mod: ,,, | Performed by: SURGERY

## 2021-05-11 ENCOUNTER — TELEPHONE (OUTPATIENT)
Dept: SURGERY | Facility: CLINIC | Age: 56
End: 2021-05-11

## 2021-05-19 ENCOUNTER — OFFICE VISIT (OUTPATIENT)
Dept: SURGERY | Facility: CLINIC | Age: 56
End: 2021-05-19
Payer: MEDICARE

## 2021-05-19 VITALS
BODY MASS INDEX: 34.33 KG/M2 | HEART RATE: 61 BPM | WEIGHT: 213.63 LBS | HEIGHT: 66 IN | SYSTOLIC BLOOD PRESSURE: 141 MMHG | TEMPERATURE: 98 F | OXYGEN SATURATION: 99 % | DIASTOLIC BLOOD PRESSURE: 86 MMHG

## 2021-05-19 DIAGNOSIS — Z87.19 S/P HERNIA REPAIR: Primary | ICD-10-CM

## 2021-05-19 DIAGNOSIS — Z98.890 S/P HERNIA REPAIR: Primary | ICD-10-CM

## 2021-05-19 PROCEDURE — 99024 PR POST-OP FOLLOW-UP VISIT: ICD-10-PCS | Mod: POP,,, | Performed by: SURGERY

## 2021-05-19 PROCEDURE — 99024 POSTOP FOLLOW-UP VISIT: CPT | Mod: POP,,, | Performed by: SURGERY

## 2021-05-19 PROCEDURE — 99999 PR PBB SHADOW E&M-EST. PATIENT-LVL IV: ICD-10-PCS | Mod: PBBFAC,,, | Performed by: SURGERY

## 2021-05-19 PROCEDURE — 99214 OFFICE O/P EST MOD 30 MIN: CPT | Mod: PBBFAC | Performed by: SURGERY

## 2021-05-19 PROCEDURE — 99999 PR PBB SHADOW E&M-EST. PATIENT-LVL IV: CPT | Mod: PBBFAC,,, | Performed by: SURGERY

## 2021-06-16 ENCOUNTER — TELEPHONE (OUTPATIENT)
Dept: SURGERY | Facility: CLINIC | Age: 56
End: 2021-06-16

## 2021-06-16 RX ORDER — SULFAMETHOXAZOLE AND TRIMETHOPRIM 800; 160 MG/1; MG/1
1 TABLET ORAL
COMMUNITY

## 2021-06-28 ENCOUNTER — TELEPHONE (OUTPATIENT)
Dept: SURGERY | Facility: CLINIC | Age: 56
End: 2021-06-28

## 2021-06-28 DIAGNOSIS — L02.211 ABDOMINAL WALL ABSCESS: Primary | ICD-10-CM

## 2021-06-30 ENCOUNTER — HOSPITAL ENCOUNTER (OUTPATIENT)
Dept: RADIOLOGY | Facility: HOSPITAL | Age: 56
Discharge: HOME | End: 2021-06-30
Attending: SURGERY
Payer: MEDICARE

## 2021-06-30 DIAGNOSIS — K43.2 INCISIONAL HERNIA, WITHOUT OBSTRUCTION OR GANGRENE: Primary | ICD-10-CM

## 2021-06-30 DIAGNOSIS — L02.211 ABDOMINAL WALL ABSCESS: ICD-10-CM

## 2021-06-30 PROCEDURE — A9698 NON-RAD CONTRAST MATERIALNOC: HCPCS | Performed by: SURGERY

## 2021-06-30 PROCEDURE — 25500020 PHARM REV CODE 255: Performed by: SURGERY

## 2021-06-30 PROCEDURE — 74160 CT ABDOMEN WITH CONTRAST: ICD-10-PCS | Mod: 26,,, | Performed by: RADIOLOGY

## 2021-06-30 PROCEDURE — 74160 CT ABDOMEN W/CONTRAST: CPT | Mod: TC

## 2021-06-30 PROCEDURE — 74160 CT ABDOMEN W/CONTRAST: CPT | Mod: 26,,, | Performed by: RADIOLOGY

## 2021-06-30 RX ADMIN — IOHEXOL 100 ML: 350 INJECTION, SOLUTION INTRAVENOUS at 09:06

## 2021-06-30 RX ADMIN — IOHEXOL 700 ML: 9 SOLUTION ORAL at 07:06

## 2021-07-01 ENCOUNTER — TELEPHONE (OUTPATIENT)
Dept: SURGERY | Facility: CLINIC | Age: 56
End: 2021-07-01

## 2021-07-01 RX ORDER — SULFAMETHOXAZOLE AND TRIMETHOPRIM 800; 160 MG/1; MG/1
1 TABLET ORAL
COMMUNITY

## (undated) DEVICE — DRAPE ABDOMINAL TIBURON 14X11

## (undated) DEVICE — SUT 2-0 12-18IN SILK

## (undated) DEVICE — STAPLER CIRCULAR 25MM

## (undated) DEVICE — NDL HYPO REG 25G X 1 1/2

## (undated) DEVICE — SUT 3-0 12-18IN SILK

## (undated) DEVICE — SUT PDS II 1 TP-1 VIL

## (undated) DEVICE — TRAY MINOR GEN SURG

## (undated) DEVICE — ELECTRODE REM PLYHSV RETURN 9

## (undated) DEVICE — SPONGE LAP 18X18 PREWASHED

## (undated) DEVICE — GAUZE SPONGE 4X4 12PLY

## (undated) DEVICE — SEE MEDLINE ITEM 157117

## (undated) DEVICE — SUT 3/0 27IN PDS II VIO MO

## (undated) DEVICE — DRESSING VERSA-FOAM W/O TUBE

## (undated) DEVICE — DRESSING ADH ISLAND 3.6 X 14

## (undated) DEVICE — SUT COATED VICRYL 4/0 27IN

## (undated) DEVICE — BINDER ABDOMINAL 9 30-45

## (undated) DEVICE — STAPLER SKIN PROXIMATE WIDE

## (undated) DEVICE — SEE MEDLINE ITEM 152741

## (undated) DEVICE — CUTTER PROXIMATE BLUE 75MM

## (undated) DEVICE — SEE MEDLINE ITEM 156902

## (undated) DEVICE — SUT PROLENE 1 CTX 30IN BLUE

## (undated) DEVICE — APPLICATOR CHLORAPREP ORN 26ML

## (undated) DEVICE — STAPLER SKIN ROTATING HEAD

## (undated) DEVICE — GOWN SURGICAL X-LARGE

## (undated) DEVICE — SUT 1 36IN PDS II VIO MONO

## (undated) DEVICE — SUT VICRYL CT-1 2-0 27IN

## (undated) DEVICE — TAPE MEDIPORE 3 X 10YD

## (undated) DEVICE — CANISTER INFOV.A.C WOUND 500ML

## (undated) DEVICE — SEE MEDLINE ITEM 152622

## (undated) DEVICE — DRESSING MEPORE 3.6 X 10

## (undated) DEVICE — SEE MEDLINE ITEM 146417

## (undated) DEVICE — SUT SILK 3-0 SH 18IN BLACK

## (undated) DEVICE — BANDAGE KERLIX P/P 2.25IN STER

## (undated) DEVICE — ELECTRODE EXTENDED BLADE

## (undated) DEVICE — SEE MEDLINE ITEM 146292

## (undated) DEVICE — SUT VICRYL PLUS 3-0 SH 18IN

## (undated) DEVICE — TRAY FOLEY 16FR INFECTION CONT

## (undated) DEVICE — EVACUATOR WOUND BULB 100CC

## (undated) DEVICE — SUT SILK 2-0 SH 18IN BLACK

## (undated) DEVICE — DRAIN SINGLE ROUND 1/4 19FR

## (undated) DEVICE — CONTAINER SPECIMEN STRL 4OZ

## (undated) DEVICE — DRESSING ABSRBNT ISLAND 3.6X8

## (undated) DEVICE — LEGGING CLEAR POLY 2/PACK

## (undated) DEVICE — RELOAD PROXIMATE CUT BLUE 75MM

## (undated) DEVICE — SUT VICRYL 3-0 27 SH

## (undated) DEVICE — SUT SILK SH 2-0 30IN MP BLK

## (undated) DEVICE — DRESSING INFOVAC MED RND BLK